# Patient Record
Sex: FEMALE | Race: WHITE | Employment: OTHER | URBAN - NONMETROPOLITAN AREA
[De-identification: names, ages, dates, MRNs, and addresses within clinical notes are randomized per-mention and may not be internally consistent; named-entity substitution may affect disease eponyms.]

---

## 2017-01-16 ENCOUNTER — OFFICE VISIT (OUTPATIENT)
Dept: FAMILY MEDICINE CLINIC | Age: 77
End: 2017-01-16

## 2017-01-16 VITALS
HEIGHT: 63 IN | BODY MASS INDEX: 34.73 KG/M2 | WEIGHT: 196 LBS | TEMPERATURE: 98.8 F | DIASTOLIC BLOOD PRESSURE: 66 MMHG | HEART RATE: 60 BPM | SYSTOLIC BLOOD PRESSURE: 122 MMHG

## 2017-01-16 DIAGNOSIS — R05.9 COUGH: Primary | ICD-10-CM

## 2017-01-16 PROCEDURE — 1090F PRES/ABSN URINE INCON ASSESS: CPT | Performed by: NURSE PRACTITIONER

## 2017-01-16 PROCEDURE — G8484 FLU IMMUNIZE NO ADMIN: HCPCS | Performed by: NURSE PRACTITIONER

## 2017-01-16 PROCEDURE — 99213 OFFICE O/P EST LOW 20 MIN: CPT | Performed by: NURSE PRACTITIONER

## 2017-01-16 PROCEDURE — G8427 DOCREV CUR MEDS BY ELIG CLIN: HCPCS | Performed by: NURSE PRACTITIONER

## 2017-01-16 PROCEDURE — G8419 CALC BMI OUT NRM PARAM NOF/U: HCPCS | Performed by: NURSE PRACTITIONER

## 2017-01-16 PROCEDURE — 1123F ACP DISCUSS/DSCN MKR DOCD: CPT | Performed by: NURSE PRACTITIONER

## 2017-01-16 PROCEDURE — 4040F PNEUMOC VAC/ADMIN/RCVD: CPT | Performed by: NURSE PRACTITIONER

## 2017-01-16 PROCEDURE — G8399 PT W/DXA RESULTS DOCUMENT: HCPCS | Performed by: NURSE PRACTITIONER

## 2017-01-16 PROCEDURE — 1036F TOBACCO NON-USER: CPT | Performed by: NURSE PRACTITIONER

## 2017-01-16 RX ORDER — FLUTICASONE PROPIONATE 50 MCG
2 SPRAY, SUSPENSION (ML) NASAL DAILY
Qty: 1 BOTTLE | Refills: 3 | Status: ON HOLD | OUTPATIENT
Start: 2017-01-16 | End: 2017-03-10

## 2017-01-16 RX ORDER — PREDNISONE 20 MG/1
20 TABLET ORAL 2 TIMES DAILY
Qty: 10 TABLET | Refills: 0 | Status: SHIPPED | OUTPATIENT
Start: 2017-01-16 | End: 2017-01-21

## 2017-01-16 RX ORDER — CETIRIZINE HYDROCHLORIDE 10 MG/1
10 TABLET ORAL DAILY
Qty: 30 TABLET | Refills: 3 | Status: ON HOLD | OUTPATIENT
Start: 2017-01-16 | End: 2017-03-10

## 2017-01-16 ASSESSMENT — ENCOUNTER SYMPTOMS
SORE THROAT: 0
RHINORRHEA: 1
COUGH: 1
WHEEZING: 1
SHORTNESS OF BREATH: 0

## 2017-02-20 ENCOUNTER — TELEPHONE (OUTPATIENT)
Dept: CARDIOLOGY | Age: 77
End: 2017-02-20

## 2017-02-20 RX ORDER — AMLODIPINE BESYLATE 5 MG/1
5 TABLET ORAL DAILY
Qty: 30 TABLET | Refills: 3 | Status: SHIPPED | OUTPATIENT
Start: 2017-02-20 | End: 2017-06-06 | Stop reason: SDUPTHER

## 2017-03-09 ENCOUNTER — APPOINTMENT (OUTPATIENT)
Dept: CT IMAGING | Age: 77
DRG: 390 | End: 2017-03-09
Payer: MEDICARE

## 2017-03-09 ENCOUNTER — HOSPITAL ENCOUNTER (INPATIENT)
Age: 77
LOS: 1 days | Discharge: HOME | DRG: 390 | End: 2017-03-11
Attending: EMERGENCY MEDICINE | Admitting: FAMILY MEDICINE
Payer: MEDICARE

## 2017-03-09 DIAGNOSIS — K56.609 SMALL BOWEL OBSTRUCTION (HCC): Primary | ICD-10-CM

## 2017-03-09 LAB
ABSOLUTE EOS #: 0.3 K/UL (ref 0–0.4)
ABSOLUTE LYMPH #: 2 K/UL (ref 1–4.8)
ABSOLUTE MONO #: 0.7 K/UL (ref 0.1–1.2)
ALBUMIN SERPL-MCNC: 4.6 G/DL (ref 3.5–5.2)
ALBUMIN/GLOBULIN RATIO: 1.5 (ref 1–2.5)
ALP BLD-CCNC: 70 U/L (ref 35–104)
ALT SERPL-CCNC: 15 U/L (ref 5–33)
ANION GAP SERPL CALCULATED.3IONS-SCNC: 19 MMOL/L (ref 9–17)
AST SERPL-CCNC: 22 U/L
BASOPHILS # BLD: 1 % (ref 0–2)
BASOPHILS ABSOLUTE: 0.1 K/UL (ref 0–0.2)
BILIRUB SERPL-MCNC: 0.69 MG/DL (ref 0.3–1.2)
BUN BLDV-MCNC: 29 MG/DL (ref 8–23)
BUN/CREAT BLD: 20 (ref 9–20)
CALCIUM SERPL-MCNC: 10.9 MG/DL (ref 8.6–10.4)
CHLORIDE BLD-SCNC: 96 MMOL/L (ref 98–107)
CO2: 26 MMOL/L (ref 20–31)
CREAT SERPL-MCNC: 1.46 MG/DL (ref 0.5–0.9)
DIFFERENTIAL TYPE: ABNORMAL
EOSINOPHILS RELATIVE PERCENT: 2 % (ref 1–4)
GFR AFRICAN AMERICAN: 42 ML/MIN
GFR NON-AFRICAN AMERICAN: 35 ML/MIN
GFR SERPL CREATININE-BSD FRML MDRD: ABNORMAL ML/MIN/{1.73_M2}
GFR SERPL CREATININE-BSD FRML MDRD: ABNORMAL ML/MIN/{1.73_M2}
GLUCOSE BLD-MCNC: 128 MG/DL (ref 70–99)
HCT VFR BLD CALC: 38.3 % (ref 36–46)
HEMOGLOBIN: 12.9 G/DL (ref 12–16)
LACTIC ACID: 2.3 MMOL/L (ref 0.5–2.2)
LIPASE: 43 U/L (ref 13–60)
LYMPHOCYTES # BLD: 18 % (ref 24–44)
MCH RBC QN AUTO: 30.9 PG (ref 26–34)
MCHC RBC AUTO-ENTMCNC: 33.7 G/DL (ref 31–37)
MCV RBC AUTO: 91.7 FL (ref 80–100)
MONOCYTES # BLD: 6 % (ref 1–7)
PDW BLD-RTO: 13.2 % (ref 11–14.5)
PLATELET # BLD: 210 K/UL (ref 140–450)
PLATELET ESTIMATE: ABNORMAL
PMV BLD AUTO: 9.8 FL (ref 6–12)
POTASSIUM SERPL-SCNC: 4.3 MMOL/L (ref 3.7–5.3)
RBC # BLD: 4.17 M/UL (ref 4–5.2)
RBC # BLD: ABNORMAL 10*6/UL
SEG NEUTROPHILS: 73 % (ref 36–66)
SEGMENTED NEUTROPHILS ABSOLUTE COUNT: 8.4 K/UL (ref 1.8–7.7)
SODIUM BLD-SCNC: 141 MMOL/L (ref 135–144)
TOTAL PROTEIN: 7.6 G/DL (ref 6.4–8.3)
WBC # BLD: 11.6 K/UL (ref 3.5–11)
WBC # BLD: ABNORMAL 10*3/UL

## 2017-03-09 PROCEDURE — 36415 COLL VENOUS BLD VENIPUNCTURE: CPT

## 2017-03-09 PROCEDURE — 6360000002 HC RX W HCPCS

## 2017-03-09 PROCEDURE — 6360000004 HC RX CONTRAST MEDICATION: Performed by: EMERGENCY MEDICINE

## 2017-03-09 PROCEDURE — 74176 CT ABD & PELVIS W/O CONTRAST: CPT | Performed by: RADIOLOGY

## 2017-03-09 PROCEDURE — 99285 EMERGENCY DEPT VISIT HI MDM: CPT

## 2017-03-09 PROCEDURE — 6360000002 HC RX W HCPCS: Performed by: EMERGENCY MEDICINE

## 2017-03-09 PROCEDURE — 80053 COMPREHEN METABOLIC PANEL: CPT

## 2017-03-09 PROCEDURE — 83605 ASSAY OF LACTIC ACID: CPT

## 2017-03-09 PROCEDURE — 96375 TX/PRO/DX INJ NEW DRUG ADDON: CPT

## 2017-03-09 PROCEDURE — 74176 CT ABD & PELVIS W/O CONTRAST: CPT

## 2017-03-09 PROCEDURE — 2580000003 HC RX 258: Performed by: EMERGENCY MEDICINE

## 2017-03-09 PROCEDURE — 85025 COMPLETE CBC W/AUTO DIFF WBC: CPT

## 2017-03-09 PROCEDURE — 96374 THER/PROPH/DIAG INJ IV PUSH: CPT

## 2017-03-09 PROCEDURE — 83690 ASSAY OF LIPASE: CPT

## 2017-03-09 RX ORDER — 0.9 % SODIUM CHLORIDE 0.9 %
1000 INTRAVENOUS SOLUTION INTRAVENOUS ONCE
Status: COMPLETED | OUTPATIENT
Start: 2017-03-09 | End: 2017-03-09

## 2017-03-09 RX ORDER — MORPHINE SULFATE 4 MG/ML
4 INJECTION, SOLUTION INTRAMUSCULAR; INTRAVENOUS ONCE
Status: DISCONTINUED | OUTPATIENT
Start: 2017-03-09 | End: 2017-03-10

## 2017-03-09 RX ORDER — KETOROLAC TROMETHAMINE 30 MG/ML
INJECTION, SOLUTION INTRAMUSCULAR; INTRAVENOUS
Status: COMPLETED
Start: 2017-03-09 | End: 2017-03-09

## 2017-03-09 RX ORDER — KETOROLAC TROMETHAMINE 30 MG/ML
30 INJECTION, SOLUTION INTRAMUSCULAR; INTRAVENOUS ONCE
Status: COMPLETED | OUTPATIENT
Start: 2017-03-09 | End: 2017-03-09

## 2017-03-09 RX ORDER — ONDANSETRON 2 MG/ML
4 INJECTION INTRAMUSCULAR; INTRAVENOUS ONCE
Status: COMPLETED | OUTPATIENT
Start: 2017-03-09 | End: 2017-03-09

## 2017-03-09 RX ADMIN — KETOROLAC TROMETHAMINE 30 MG: 30 INJECTION, SOLUTION INTRAMUSCULAR; INTRAVENOUS at 21:41

## 2017-03-09 RX ADMIN — SODIUM CHLORIDE 1000 ML: 9 INJECTION, SOLUTION INTRAVENOUS at 21:02

## 2017-03-09 RX ADMIN — KETOROLAC TROMETHAMINE 30 MG: 30 INJECTION, SOLUTION INTRAMUSCULAR at 21:41

## 2017-03-09 RX ADMIN — IOHEXOL 50 ML: 300 INJECTION, SOLUTION INTRAVENOUS at 23:17

## 2017-03-09 RX ADMIN — ONDANSETRON 4 MG: 2 INJECTION INTRAMUSCULAR; INTRAVENOUS at 21:02

## 2017-03-09 ASSESSMENT — PAIN SCALES - GENERAL
PAINLEVEL_OUTOF10: 10
PAINLEVEL_OUTOF10: 10
PAINLEVEL_OUTOF10: 5
PAINLEVEL_OUTOF10: 10

## 2017-03-09 ASSESSMENT — PAIN DESCRIPTION - PROGRESSION: CLINICAL_PROGRESSION: NOT CHANGED

## 2017-03-09 ASSESSMENT — ENCOUNTER SYMPTOMS
RESPIRATORY NEGATIVE: 1
ABDOMINAL PAIN: 1

## 2017-03-09 ASSESSMENT — PAIN DESCRIPTION - DESCRIPTORS: DESCRIPTORS: ACHING;CRAMPING

## 2017-03-09 ASSESSMENT — PAIN DESCRIPTION - ORIENTATION: ORIENTATION: MID;RIGHT;LEFT

## 2017-03-09 ASSESSMENT — PAIN DESCRIPTION - FREQUENCY: FREQUENCY: CONTINUOUS

## 2017-03-09 ASSESSMENT — PAIN DESCRIPTION - ONSET: ONSET: SUDDEN

## 2017-03-09 ASSESSMENT — PAIN DESCRIPTION - PAIN TYPE
TYPE: ACUTE PAIN
TYPE: ACUTE PAIN

## 2017-03-09 ASSESSMENT — PAIN DESCRIPTION - LOCATION: LOCATION: ABDOMEN

## 2017-03-10 PROBLEM — K56.609 SMALL BOWEL OBSTRUCTION (HCC): Status: ACTIVE | Noted: 2017-03-10

## 2017-03-10 LAB
-: ABNORMAL
ABSOLUTE EOS #: 0.1 K/UL (ref 0–0.4)
ABSOLUTE LYMPH #: 1.3 K/UL (ref 1–4.8)
ABSOLUTE MONO #: 0.6 K/UL (ref 0.1–1.2)
ALBUMIN SERPL-MCNC: 3.8 G/DL (ref 3.5–5.2)
ALBUMIN/GLOBULIN RATIO: 1.5 (ref 1–2.5)
ALP BLD-CCNC: 59 U/L (ref 35–104)
ALT SERPL-CCNC: 12 U/L (ref 5–33)
AMORPHOUS: ABNORMAL
ANION GAP SERPL CALCULATED.3IONS-SCNC: 11 MMOL/L (ref 9–17)
AST SERPL-CCNC: 17 U/L
BACTERIA: ABNORMAL
BASOPHILS # BLD: 1 % (ref 0–2)
BASOPHILS ABSOLUTE: 0 K/UL (ref 0–0.2)
BILIRUB SERPL-MCNC: 0.79 MG/DL (ref 0.3–1.2)
BILIRUBIN URINE: NEGATIVE
BUN BLDV-MCNC: 31 MG/DL (ref 8–23)
BUN/CREAT BLD: 20 (ref 9–20)
CALCIUM SERPL-MCNC: 10.1 MG/DL (ref 8.6–10.4)
CASTS UA: ABNORMAL /LPF (ref 0–2)
CHLORIDE BLD-SCNC: 98 MMOL/L (ref 98–107)
CO2: 29 MMOL/L (ref 20–31)
COLOR: ABNORMAL
COMMENT UA: ABNORMAL
CREAT SERPL-MCNC: 1.52 MG/DL (ref 0.5–0.9)
CRYSTALS, UA: ABNORMAL /HPF
DIFFERENTIAL TYPE: ABNORMAL
EKG ATRIAL RATE: 66 BPM
EKG P AXIS: -7 DEGREES
EKG P-R INTERVAL: 178 MS
EKG Q-T INTERVAL: 430 MS
EKG QRS DURATION: 96 MS
EKG QTC CALCULATION (BAZETT): 450 MS
EKG R AXIS: 7 DEGREES
EKG T AXIS: 111 DEGREES
EKG VENTRICULAR RATE: 66 BPM
EOSINOPHILS RELATIVE PERCENT: 2 % (ref 1–4)
EPITHELIAL CELLS UA: ABNORMAL /HPF (ref 0–5)
GFR AFRICAN AMERICAN: 40 ML/MIN
GFR NON-AFRICAN AMERICAN: 33 ML/MIN
GFR SERPL CREATININE-BSD FRML MDRD: ABNORMAL ML/MIN/{1.73_M2}
GFR SERPL CREATININE-BSD FRML MDRD: ABNORMAL ML/MIN/{1.73_M2}
GLUCOSE BLD-MCNC: 142 MG/DL (ref 70–99)
GLUCOSE URINE: NEGATIVE
HCT VFR BLD CALC: 33.9 % (ref 36–46)
HEMOGLOBIN: 11.6 G/DL (ref 12–16)
INR BLD: 1.1
KETONES, URINE: NEGATIVE
LACTIC ACID: 1.6 MMOL/L (ref 0.5–2.2)
LACTIC ACID: 2.3 MMOL/L (ref 0.5–2.2)
LEUKOCYTE ESTERASE, URINE: NEGATIVE
LYMPHOCYTES # BLD: 15 % (ref 24–44)
MAGNESIUM: 2 MG/DL (ref 1.6–2.6)
MCH RBC QN AUTO: 31.7 PG (ref 26–34)
MCHC RBC AUTO-ENTMCNC: 34.2 G/DL (ref 31–37)
MCV RBC AUTO: 92.5 FL (ref 80–100)
MONOCYTES # BLD: 7 % (ref 1–7)
MUCUS: ABNORMAL
NITRITE, URINE: NEGATIVE
OTHER OBSERVATIONS UA: ABNORMAL
PARTIAL THROMBOPLASTIN TIME: 23.8 SEC (ref 27–35)
PDW BLD-RTO: 13 % (ref 11–14.5)
PH UA: 8 (ref 5–6)
PLATELET # BLD: 202 K/UL (ref 140–450)
PLATELET ESTIMATE: ABNORMAL
PMV BLD AUTO: 9.8 FL (ref 6–12)
POTASSIUM SERPL-SCNC: 4.5 MMOL/L (ref 3.7–5.3)
PROTEIN UA: NEGATIVE
PROTHROMBIN TIME: 11.9 SEC (ref 9.4–11.3)
RBC # BLD: 3.66 M/UL (ref 4–5.2)
RBC # BLD: ABNORMAL 10*6/UL
RBC UA: ABNORMAL /HPF (ref 0–4)
RENAL EPITHELIAL, UA: ABNORMAL /HPF
SEG NEUTROPHILS: 75 % (ref 36–66)
SEGMENTED NEUTROPHILS ABSOLUTE COUNT: 6.3 K/UL (ref 1.8–7.7)
SODIUM BLD-SCNC: 138 MMOL/L (ref 135–144)
SPECIFIC GRAVITY UA: 1.01 (ref 1.01–1.02)
TOTAL PROTEIN: 6.3 G/DL (ref 6.4–8.3)
TRICHOMONAS: ABNORMAL
TURBIDITY: ABNORMAL
URINE HGB: NEGATIVE
UROBILINOGEN, URINE: NORMAL
WBC # BLD: 8.3 K/UL (ref 3.5–11)
WBC # BLD: ABNORMAL 10*3/UL
WBC UA: ABNORMAL /HPF (ref 0–4)
YEAST: ABNORMAL

## 2017-03-10 PROCEDURE — 85025 COMPLETE CBC W/AUTO DIFF WBC: CPT

## 2017-03-10 PROCEDURE — 2580000003 HC RX 258: Performed by: INTERNAL MEDICINE

## 2017-03-10 PROCEDURE — 99232 SBSQ HOSP IP/OBS MODERATE 35: CPT | Performed by: INTERNAL MEDICINE

## 2017-03-10 PROCEDURE — 85610 PROTHROMBIN TIME: CPT

## 2017-03-10 PROCEDURE — 93005 ELECTROCARDIOGRAM TRACING: CPT

## 2017-03-10 PROCEDURE — 87086 URINE CULTURE/COLONY COUNT: CPT

## 2017-03-10 PROCEDURE — 36415 COLL VENOUS BLD VENIPUNCTURE: CPT

## 2017-03-10 PROCEDURE — 6360000002 HC RX W HCPCS: Performed by: NURSE PRACTITIONER

## 2017-03-10 PROCEDURE — 85730 THROMBOPLASTIN TIME PARTIAL: CPT

## 2017-03-10 PROCEDURE — 81001 URINALYSIS AUTO W/SCOPE: CPT

## 2017-03-10 PROCEDURE — 6370000000 HC RX 637 (ALT 250 FOR IP): Performed by: NURSE PRACTITIONER

## 2017-03-10 PROCEDURE — 2580000003 HC RX 258: Performed by: NURSE PRACTITIONER

## 2017-03-10 PROCEDURE — 80053 COMPREHEN METABOLIC PANEL: CPT

## 2017-03-10 PROCEDURE — 83605 ASSAY OF LACTIC ACID: CPT

## 2017-03-10 PROCEDURE — 2060000000 HC ICU INTERMEDIATE R&B

## 2017-03-10 PROCEDURE — 83735 ASSAY OF MAGNESIUM: CPT

## 2017-03-10 RX ORDER — SODIUM CHLORIDE 0.9 % (FLUSH) 0.9 %
10 SYRINGE (ML) INJECTION PRN
Status: DISCONTINUED | OUTPATIENT
Start: 2017-03-10 | End: 2017-03-11 | Stop reason: HOSPADM

## 2017-03-10 RX ORDER — ACETAMINOPHEN 325 MG/1
650 TABLET ORAL EVERY 4 HOURS PRN
Status: DISCONTINUED | OUTPATIENT
Start: 2017-03-10 | End: 2017-03-11 | Stop reason: HOSPADM

## 2017-03-10 RX ORDER — AMLODIPINE BESYLATE 5 MG/1
5 TABLET ORAL DAILY
Status: DISCONTINUED | OUTPATIENT
Start: 2017-03-10 | End: 2017-03-11 | Stop reason: HOSPADM

## 2017-03-10 RX ORDER — KETOROLAC TROMETHAMINE 30 MG/ML
15 INJECTION, SOLUTION INTRAMUSCULAR; INTRAVENOUS EVERY 6 HOURS
Status: DISCONTINUED | OUTPATIENT
Start: 2017-03-10 | End: 2017-03-11 | Stop reason: HOSPADM

## 2017-03-10 RX ORDER — 0.9 % SODIUM CHLORIDE 0.9 %
1000 INTRAVENOUS SOLUTION INTRAVENOUS ONCE
Status: COMPLETED | OUTPATIENT
Start: 2017-03-10 | End: 2017-03-10

## 2017-03-10 RX ORDER — ONDANSETRON 2 MG/ML
4 INJECTION INTRAMUSCULAR; INTRAVENOUS EVERY 6 HOURS PRN
Status: DISCONTINUED | OUTPATIENT
Start: 2017-03-10 | End: 2017-03-11 | Stop reason: HOSPADM

## 2017-03-10 RX ORDER — PROPAFENONE HYDROCHLORIDE 150 MG/1
225 TABLET, FILM COATED ORAL EVERY 8 HOURS SCHEDULED
Status: DISCONTINUED | OUTPATIENT
Start: 2017-03-10 | End: 2017-03-11 | Stop reason: HOSPADM

## 2017-03-10 RX ORDER — SODIUM CHLORIDE 9 MG/ML
INJECTION, SOLUTION INTRAVENOUS CONTINUOUS
Status: DISCONTINUED | OUTPATIENT
Start: 2017-03-10 | End: 2017-03-11 | Stop reason: HOSPADM

## 2017-03-10 RX ORDER — SODIUM CHLORIDE 0.9 % (FLUSH) 0.9 %
10 SYRINGE (ML) INJECTION EVERY 12 HOURS SCHEDULED
Status: DISCONTINUED | OUTPATIENT
Start: 2017-03-10 | End: 2017-03-11 | Stop reason: HOSPADM

## 2017-03-10 RX ORDER — ASPIRIN 81 MG/1
81 TABLET ORAL DAILY
Status: DISCONTINUED | OUTPATIENT
Start: 2017-03-10 | End: 2017-03-10

## 2017-03-10 RX ADMIN — HYDROMORPHONE HYDROCHLORIDE 0.5 MG: 1 INJECTION, SOLUTION INTRAMUSCULAR; INTRAVENOUS; SUBCUTANEOUS at 04:01

## 2017-03-10 RX ADMIN — PROPAFENONE HYDROCHLORIDE 225 MG: 150 TABLET, FILM COATED ORAL at 06:32

## 2017-03-10 RX ADMIN — HYDROMORPHONE HYDROCHLORIDE: 1 INJECTION, SOLUTION INTRAMUSCULAR; INTRAVENOUS; SUBCUTANEOUS at 13:46

## 2017-03-10 RX ADMIN — KETOROLAC TROMETHAMINE 15 MG: 30 INJECTION, SOLUTION INTRAMUSCULAR at 09:56

## 2017-03-10 RX ADMIN — PROPAFENONE HYDROCHLORIDE 225 MG: 150 TABLET, FILM COATED ORAL at 14:41

## 2017-03-10 RX ADMIN — SODIUM CHLORIDE: 9 INJECTION, SOLUTION INTRAVENOUS at 18:09

## 2017-03-10 RX ADMIN — KETOROLAC TROMETHAMINE 15 MG: 30 INJECTION, SOLUTION INTRAMUSCULAR at 14:41

## 2017-03-10 RX ADMIN — PROPAFENONE HYDROCHLORIDE 225 MG: 150 TABLET, FILM COATED ORAL at 22:25

## 2017-03-10 RX ADMIN — SODIUM CHLORIDE 1000 ML: 9 INJECTION, SOLUTION INTRAVENOUS at 09:58

## 2017-03-10 RX ADMIN — KETOROLAC TROMETHAMINE 15 MG: 30 INJECTION, SOLUTION INTRAMUSCULAR at 02:29

## 2017-03-10 RX ADMIN — ONDANSETRON 4 MG: 2 INJECTION INTRAMUSCULAR; INTRAVENOUS at 04:01

## 2017-03-10 RX ADMIN — KETOROLAC TROMETHAMINE 15 MG: 30 INJECTION, SOLUTION INTRAMUSCULAR at 20:28

## 2017-03-10 RX ADMIN — Medication 10 ML: at 20:28

## 2017-03-10 RX ADMIN — SODIUM CHLORIDE: 9 INJECTION, SOLUTION INTRAVENOUS at 02:32

## 2017-03-10 RX ADMIN — AMLODIPINE BESYLATE 5 MG: 5 TABLET ORAL at 09:56

## 2017-03-10 RX ADMIN — SODIUM CHLORIDE: 9 INJECTION, SOLUTION INTRAVENOUS at 11:01

## 2017-03-10 ASSESSMENT — PAIN DESCRIPTION - FREQUENCY
FREQUENCY: INTERMITTENT
FREQUENCY: INTERMITTENT
FREQUENCY: CONTINUOUS

## 2017-03-10 ASSESSMENT — PAIN SCALES - GENERAL
PAINLEVEL_OUTOF10: 3
PAINLEVEL_OUTOF10: 1
PAINLEVEL_OUTOF10: 0
PAINLEVEL_OUTOF10: 4
PAINLEVEL_OUTOF10: 7
PAINLEVEL_OUTOF10: 0
PAINLEVEL_OUTOF10: 2
PAINLEVEL_OUTOF10: 3
PAINLEVEL_OUTOF10: 0
PAINLEVEL_OUTOF10: 0

## 2017-03-10 ASSESSMENT — PAIN DESCRIPTION - DESCRIPTORS
DESCRIPTORS: DULL
DESCRIPTORS: SORE
DESCRIPTORS: DISCOMFORT

## 2017-03-10 ASSESSMENT — PAIN DESCRIPTION - DIRECTION
RADIATING_TOWARDS: 0
RADIATING_TOWARDS: 0

## 2017-03-10 ASSESSMENT — PAIN DESCRIPTION - PAIN TYPE
TYPE: ACUTE PAIN

## 2017-03-10 ASSESSMENT — PAIN DESCRIPTION - ORIENTATION
ORIENTATION: UPPER
ORIENTATION: UPPER
ORIENTATION: LEFT;MID

## 2017-03-10 ASSESSMENT — PAIN DESCRIPTION - LOCATION
LOCATION: ABDOMEN

## 2017-03-10 ASSESSMENT — PAIN DESCRIPTION - PROGRESSION
CLINICAL_PROGRESSION: GRADUALLY WORSENING
CLINICAL_PROGRESSION: NOT CHANGED

## 2017-03-10 ASSESSMENT — PAIN DESCRIPTION - ONSET
ONSET: ON-GOING
ONSET: ON-GOING

## 2017-03-11 ENCOUNTER — APPOINTMENT (OUTPATIENT)
Dept: GENERAL RADIOLOGY | Age: 77
DRG: 390 | End: 2017-03-11
Payer: MEDICARE

## 2017-03-11 VITALS
DIASTOLIC BLOOD PRESSURE: 75 MMHG | WEIGHT: 209 LBS | HEART RATE: 73 BPM | SYSTOLIC BLOOD PRESSURE: 142 MMHG | HEIGHT: 62 IN | TEMPERATURE: 97.1 F | RESPIRATION RATE: 18 BRPM | OXYGEN SATURATION: 98 % | BODY MASS INDEX: 38.46 KG/M2

## 2017-03-11 LAB
ABSOLUTE EOS #: 0.6 K/UL (ref 0–0.4)
ABSOLUTE LYMPH #: 1.4 K/UL (ref 1–4.8)
ABSOLUTE MONO #: 0.5 K/UL (ref 0.1–1.2)
ANION GAP SERPL CALCULATED.3IONS-SCNC: 10 MMOL/L (ref 9–17)
BASOPHILS # BLD: 1 % (ref 0–2)
BASOPHILS ABSOLUTE: 0 K/UL (ref 0–0.2)
BUN BLDV-MCNC: 24 MG/DL (ref 8–23)
BUN/CREAT BLD: 21 (ref 9–20)
CALCIUM SERPL-MCNC: 8.6 MG/DL (ref 8.6–10.4)
CHLORIDE BLD-SCNC: 109 MMOL/L (ref 98–107)
CO2: 24 MMOL/L (ref 20–31)
CREAT SERPL-MCNC: 1.12 MG/DL (ref 0.5–0.9)
DIFFERENTIAL TYPE: ABNORMAL
EOSINOPHILS RELATIVE PERCENT: 8 % (ref 1–4)
GFR AFRICAN AMERICAN: 57 ML/MIN
GFR NON-AFRICAN AMERICAN: 47 ML/MIN
GFR SERPL CREATININE-BSD FRML MDRD: ABNORMAL ML/MIN/{1.73_M2}
GFR SERPL CREATININE-BSD FRML MDRD: ABNORMAL ML/MIN/{1.73_M2}
GLUCOSE BLD-MCNC: 100 MG/DL (ref 70–99)
HCT VFR BLD CALC: 30.3 % (ref 36–46)
HEMOGLOBIN: 10.1 G/DL (ref 12–16)
LYMPHOCYTES # BLD: 21 % (ref 24–44)
MCH RBC QN AUTO: 31.2 PG (ref 26–34)
MCHC RBC AUTO-ENTMCNC: 33.2 G/DL (ref 31–37)
MCV RBC AUTO: 94 FL (ref 80–100)
MONOCYTES # BLD: 7 % (ref 1–7)
PDW BLD-RTO: 13.4 % (ref 11–14.5)
PLATELET # BLD: 145 K/UL (ref 140–450)
PLATELET ESTIMATE: ABNORMAL
PMV BLD AUTO: 10 FL (ref 6–12)
POTASSIUM SERPL-SCNC: 4.2 MMOL/L (ref 3.7–5.3)
RBC # BLD: 3.22 M/UL (ref 4–5.2)
RBC # BLD: ABNORMAL 10*6/UL
SEG NEUTROPHILS: 63 % (ref 36–66)
SEGMENTED NEUTROPHILS ABSOLUTE COUNT: 4.3 K/UL (ref 1.8–7.7)
SODIUM BLD-SCNC: 143 MMOL/L (ref 135–144)
WBC # BLD: 6.9 K/UL (ref 3.5–11)
WBC # BLD: ABNORMAL 10*3/UL

## 2017-03-11 PROCEDURE — 6370000000 HC RX 637 (ALT 250 FOR IP): Performed by: NURSE PRACTITIONER

## 2017-03-11 PROCEDURE — 85025 COMPLETE CBC W/AUTO DIFF WBC: CPT

## 2017-03-11 PROCEDURE — 74022 RADEX COMPL AQT ABD SERIES: CPT

## 2017-03-11 PROCEDURE — 80048 BASIC METABOLIC PNL TOTAL CA: CPT

## 2017-03-11 PROCEDURE — 99231 SBSQ HOSP IP/OBS SF/LOW 25: CPT | Performed by: SURGERY

## 2017-03-11 PROCEDURE — 74022 RADEX COMPL AQT ABD SERIES: CPT | Performed by: RADIOLOGY

## 2017-03-11 PROCEDURE — 36415 COLL VENOUS BLD VENIPUNCTURE: CPT

## 2017-03-11 PROCEDURE — 6360000002 HC RX W HCPCS: Performed by: NURSE PRACTITIONER

## 2017-03-11 PROCEDURE — 99239 HOSP IP/OBS DSCHRG MGMT >30: CPT | Performed by: FAMILY MEDICINE

## 2017-03-11 PROCEDURE — 2580000003 HC RX 258: Performed by: INTERNAL MEDICINE

## 2017-03-11 RX ADMIN — PROPAFENONE HYDROCHLORIDE 225 MG: 150 TABLET, FILM COATED ORAL at 06:38

## 2017-03-11 RX ADMIN — PROPAFENONE HYDROCHLORIDE 225 MG: 150 TABLET, FILM COATED ORAL at 13:49

## 2017-03-11 RX ADMIN — AMLODIPINE BESYLATE 5 MG: 5 TABLET ORAL at 08:52

## 2017-03-11 RX ADMIN — METOPROLOL TARTRATE 12.5 MG: 25 TABLET ORAL at 08:52

## 2017-03-11 RX ADMIN — SODIUM CHLORIDE: 9 INJECTION, SOLUTION INTRAVENOUS at 01:53

## 2017-03-11 RX ADMIN — KETOROLAC TROMETHAMINE 15 MG: 30 INJECTION, SOLUTION INTRAMUSCULAR at 01:53

## 2017-03-11 RX ADMIN — SODIUM CHLORIDE: 9 INJECTION, SOLUTION INTRAVENOUS at 07:23

## 2017-03-11 ASSESSMENT — PAIN SCALES - GENERAL
PAINLEVEL_OUTOF10: 0

## 2017-03-13 ENCOUNTER — TELEPHONE (OUTPATIENT)
Dept: FAMILY MEDICINE CLINIC | Age: 77
End: 2017-03-13

## 2017-03-13 LAB
CULTURE: ABNORMAL
Lab: ABNORMAL
SPECIMEN DESCRIPTION: ABNORMAL
STATUS: ABNORMAL

## 2017-03-14 ENCOUNTER — TELEPHONE (OUTPATIENT)
Dept: FAMILY MEDICINE CLINIC | Age: 77
End: 2017-03-14

## 2017-03-14 ENCOUNTER — CARE COORDINATION (OUTPATIENT)
Dept: FAMILY MEDICINE CLINIC | Age: 77
End: 2017-03-14

## 2017-03-14 ENCOUNTER — OFFICE VISIT (OUTPATIENT)
Dept: FAMILY MEDICINE CLINIC | Age: 77
End: 2017-03-14
Payer: MEDICARE

## 2017-03-14 VITALS
HEART RATE: 68 BPM | TEMPERATURE: 98.3 F | OXYGEN SATURATION: 98 % | DIASTOLIC BLOOD PRESSURE: 62 MMHG | WEIGHT: 202.8 LBS | SYSTOLIC BLOOD PRESSURE: 120 MMHG | BODY MASS INDEX: 37.09 KG/M2

## 2017-03-14 DIAGNOSIS — I10 ESSENTIAL HYPERTENSION: ICD-10-CM

## 2017-03-14 DIAGNOSIS — R10.12 LUQ ABDOMINAL PAIN: ICD-10-CM

## 2017-03-14 DIAGNOSIS — R10.11 RUQ ABDOMINAL PAIN: Primary | ICD-10-CM

## 2017-03-14 DIAGNOSIS — K56.609 SMALL BOWEL OBSTRUCTION (HCC): ICD-10-CM

## 2017-03-14 PROCEDURE — 99495 TRANSJ CARE MGMT MOD F2F 14D: CPT | Performed by: FAMILY MEDICINE

## 2017-03-21 DIAGNOSIS — I10 ESSENTIAL HYPERTENSION: Primary | ICD-10-CM

## 2017-03-28 ENCOUNTER — OFFICE VISIT (OUTPATIENT)
Dept: FAMILY MEDICINE CLINIC | Age: 77
End: 2017-03-28
Payer: MEDICARE

## 2017-03-28 VITALS
WEIGHT: 198 LBS | SYSTOLIC BLOOD PRESSURE: 128 MMHG | BODY MASS INDEX: 36.21 KG/M2 | HEART RATE: 68 BPM | DIASTOLIC BLOOD PRESSURE: 80 MMHG | OXYGEN SATURATION: 98 %

## 2017-03-28 DIAGNOSIS — I48.20 CHRONIC ATRIAL FIBRILLATION (HCC): ICD-10-CM

## 2017-03-28 DIAGNOSIS — Z87.19 H/O SMALL BOWEL OBSTRUCTION: Primary | ICD-10-CM

## 2017-03-28 DIAGNOSIS — I10 ESSENTIAL HYPERTENSION: ICD-10-CM

## 2017-03-28 DIAGNOSIS — E78.5 HYPERLIPIDEMIA, UNSPECIFIED HYPERLIPIDEMIA TYPE: ICD-10-CM

## 2017-03-28 PROCEDURE — 1090F PRES/ABSN URINE INCON ASSESS: CPT | Performed by: FAMILY MEDICINE

## 2017-03-28 PROCEDURE — G8417 CALC BMI ABV UP PARAM F/U: HCPCS | Performed by: FAMILY MEDICINE

## 2017-03-28 PROCEDURE — 1111F DSCHRG MED/CURRENT MED MERGE: CPT | Performed by: FAMILY MEDICINE

## 2017-03-28 PROCEDURE — 99214 OFFICE O/P EST MOD 30 MIN: CPT | Performed by: FAMILY MEDICINE

## 2017-03-28 PROCEDURE — G8484 FLU IMMUNIZE NO ADMIN: HCPCS | Performed by: FAMILY MEDICINE

## 2017-03-28 PROCEDURE — 4040F PNEUMOC VAC/ADMIN/RCVD: CPT | Performed by: FAMILY MEDICINE

## 2017-03-28 PROCEDURE — 1036F TOBACCO NON-USER: CPT | Performed by: FAMILY MEDICINE

## 2017-03-28 PROCEDURE — 1123F ACP DISCUSS/DSCN MKR DOCD: CPT | Performed by: FAMILY MEDICINE

## 2017-03-28 PROCEDURE — G8427 DOCREV CUR MEDS BY ELIG CLIN: HCPCS | Performed by: FAMILY MEDICINE

## 2017-03-28 PROCEDURE — G8399 PT W/DXA RESULTS DOCUMENT: HCPCS | Performed by: FAMILY MEDICINE

## 2017-03-28 RX ORDER — SIMVASTATIN 20 MG
20 TABLET ORAL EVERY EVENING
Qty: 90 TABLET | Refills: 3 | Status: SHIPPED | OUTPATIENT
Start: 2017-03-28 | End: 2017-10-31 | Stop reason: SDUPTHER

## 2017-03-28 ASSESSMENT — ENCOUNTER SYMPTOMS
ALLERGIC/IMMUNOLOGIC NEGATIVE: 1
GASTROINTESTINAL NEGATIVE: 1
EYES NEGATIVE: 1
SHORTNESS OF BREATH: 1

## 2017-03-28 ASSESSMENT — PATIENT HEALTH QUESTIONNAIRE - PHQ9
1. LITTLE INTEREST OR PLEASURE IN DOING THINGS: 0
SUM OF ALL RESPONSES TO PHQ QUESTIONS 1-9: 0
SUM OF ALL RESPONSES TO PHQ9 QUESTIONS 1 & 2: 0
2. FEELING DOWN, DEPRESSED OR HOPELESS: 0

## 2017-05-25 ENCOUNTER — OFFICE VISIT (OUTPATIENT)
Dept: OBGYN | Age: 77
End: 2017-05-25
Payer: MEDICARE

## 2017-05-25 ENCOUNTER — HOSPITAL ENCOUNTER (OUTPATIENT)
Age: 77
Setting detail: SPECIMEN
Discharge: HOME | End: 2017-05-25
Payer: MEDICARE

## 2017-05-25 VITALS
WEIGHT: 198.8 LBS | DIASTOLIC BLOOD PRESSURE: 62 MMHG | HEART RATE: 68 BPM | SYSTOLIC BLOOD PRESSURE: 112 MMHG | BODY MASS INDEX: 36.58 KG/M2 | HEIGHT: 62 IN

## 2017-05-25 DIAGNOSIS — Z12.31 SCREENING MAMMOGRAM, ENCOUNTER FOR: ICD-10-CM

## 2017-05-25 DIAGNOSIS — Z12.4 CERVICAL CANCER SCREENING: ICD-10-CM

## 2017-05-25 DIAGNOSIS — Z01.419 WELL FEMALE EXAM WITH ROUTINE GYNECOLOGICAL EXAM: Primary | ICD-10-CM

## 2017-05-25 PROCEDURE — G8417 CALC BMI ABV UP PARAM F/U: HCPCS | Performed by: NURSE PRACTITIONER

## 2017-05-25 PROCEDURE — G8427 DOCREV CUR MEDS BY ELIG CLIN: HCPCS | Performed by: NURSE PRACTITIONER

## 2017-05-25 PROCEDURE — 1036F TOBACCO NON-USER: CPT | Performed by: NURSE PRACTITIONER

## 2017-05-25 PROCEDURE — G0101 CA SCREEN;PELVIC/BREAST EXAM: HCPCS | Performed by: NURSE PRACTITIONER

## 2017-05-25 RX ORDER — LISINOPRIL AND HYDROCHLOROTHIAZIDE 25; 20 MG/1; MG/1
TABLET ORAL
Qty: 90 TABLET | Refills: 3 | Status: SHIPPED | OUTPATIENT
Start: 2017-05-25 | End: 2017-10-31 | Stop reason: SDUPTHER

## 2017-05-30 LAB — CYTOLOGY REPORT: NORMAL

## 2017-06-06 ENCOUNTER — OFFICE VISIT (OUTPATIENT)
Dept: FAMILY MEDICINE CLINIC | Age: 77
End: 2017-06-06
Payer: MEDICARE

## 2017-06-06 VITALS
HEART RATE: 60 BPM | BODY MASS INDEX: 36.62 KG/M2 | DIASTOLIC BLOOD PRESSURE: 60 MMHG | SYSTOLIC BLOOD PRESSURE: 118 MMHG | WEIGHT: 197 LBS | OXYGEN SATURATION: 97 %

## 2017-06-06 DIAGNOSIS — E78.5 HYPERLIPIDEMIA, UNSPECIFIED HYPERLIPIDEMIA TYPE: ICD-10-CM

## 2017-06-06 DIAGNOSIS — I10 ESSENTIAL HYPERTENSION: Primary | ICD-10-CM

## 2017-06-06 DIAGNOSIS — K63.5 COLON POLYPS: ICD-10-CM

## 2017-06-06 DIAGNOSIS — K59.01 SLOW TRANSIT CONSTIPATION: ICD-10-CM

## 2017-06-06 LAB
-: NORMAL
ABSOLUTE EOS #: 0.1 K/UL (ref 0–0.4)
ABSOLUTE LYMPH #: 1.4 K/UL (ref 1–4.8)
ABSOLUTE MONO #: 0.4 K/UL (ref 0.1–1.2)
ALBUMIN SERPL-MCNC: 4.4 G/DL (ref 3.5–5.2)
ALBUMIN/GLOBULIN RATIO: 1.5 (ref 1–2.5)
ALP BLD-CCNC: 65 U/L (ref 35–104)
ALT SERPL-CCNC: 12 U/L (ref 5–33)
AMORPHOUS: NORMAL
ANION GAP SERPL CALCULATED.3IONS-SCNC: 12 MMOL/L (ref 9–17)
AST SERPL-CCNC: 18 U/L
BACTERIA: NORMAL
BASOPHILS # BLD: 1 %
BASOPHILS ABSOLUTE: 0.1 K/UL (ref 0–0.2)
BILIRUB SERPL-MCNC: 0.61 MG/DL (ref 0.3–1.2)
BILIRUBIN URINE: NEGATIVE
BUN BLDV-MCNC: 25 MG/DL (ref 8–23)
BUN/CREAT BLD: 17 (ref 9–20)
CALCIUM SERPL-MCNC: 10.1 MG/DL (ref 8.6–10.4)
CASTS UA: NORMAL /LPF (ref 0–2)
CHLORIDE BLD-SCNC: 104 MMOL/L (ref 98–107)
CHOLESTEROL/HDL RATIO: 2.2
CHOLESTEROL: 172 MG/DL
CO2: 28 MMOL/L (ref 20–31)
COLOR: NORMAL
COMMENT UA: NORMAL
CREAT SERPL-MCNC: 1.43 MG/DL (ref 0.5–0.9)
CRYSTALS, UA: NORMAL /HPF
DIFFERENTIAL TYPE: ABNORMAL
EOSINOPHILS RELATIVE PERCENT: 2 %
EPITHELIAL CELLS UA: NORMAL /HPF (ref 0–5)
GFR AFRICAN AMERICAN: 43 ML/MIN
GFR NON-AFRICAN AMERICAN: 36 ML/MIN
GFR SERPL CREATININE-BSD FRML MDRD: ABNORMAL ML/MIN/{1.73_M2}
GFR SERPL CREATININE-BSD FRML MDRD: ABNORMAL ML/MIN/{1.73_M2}
GLUCOSE BLD-MCNC: 110 MG/DL (ref 70–99)
GLUCOSE URINE: NEGATIVE
HCT VFR BLD CALC: 35.7 % (ref 36–46)
HDLC SERPL-MCNC: 79 MG/DL
HEMOGLOBIN: 12 G/DL (ref 12–16)
KETONES, URINE: NEGATIVE
LDL CHOLESTEROL: 75 MG/DL (ref 0–130)
LEUKOCYTE ESTERASE, URINE: NEGATIVE
LYMPHOCYTES # BLD: 22 %
MCH RBC QN AUTO: 31.1 PG (ref 26–34)
MCHC RBC AUTO-ENTMCNC: 33.7 G/DL (ref 31–37)
MCV RBC AUTO: 92.5 FL (ref 80–100)
MONOCYTES # BLD: 7 %
MUCUS: NORMAL
NITRITE, URINE: NEGATIVE
OTHER OBSERVATIONS UA: NORMAL
PDW BLD-RTO: 12.8 % (ref 11–14.5)
PH UA: 5 (ref 5–6)
PLATELET # BLD: 227 K/UL (ref 140–450)
PLATELET ESTIMATE: ABNORMAL
PMV BLD AUTO: 9.9 FL (ref 6–12)
POTASSIUM SERPL-SCNC: 4.7 MMOL/L (ref 3.7–5.3)
PROTEIN UA: NEGATIVE
RBC # BLD: 3.86 M/UL (ref 4–5.2)
RBC # BLD: ABNORMAL 10*6/UL
RBC UA: NORMAL /HPF (ref 0–4)
RENAL EPITHELIAL, UA: NORMAL /HPF
SEG NEUTROPHILS: 68 %
SEGMENTED NEUTROPHILS ABSOLUTE COUNT: 4.1 K/UL (ref 1.8–7.7)
SODIUM BLD-SCNC: 144 MMOL/L (ref 135–144)
SPECIFIC GRAVITY UA: 1.02 (ref 1.01–1.02)
TOTAL PROTEIN: 7.4 G/DL (ref 6.4–8.3)
TRICHOMONAS: NORMAL
TRIGL SERPL-MCNC: 90 MG/DL
TURBIDITY: NORMAL
URINE HGB: NEGATIVE
UROBILINOGEN, URINE: NORMAL
VLDLC SERPL CALC-MCNC: NORMAL MG/DL (ref 1–30)
WBC # BLD: 6.1 K/UL (ref 3.5–11)
WBC # BLD: ABNORMAL 10*3/UL
WBC UA: NORMAL /HPF (ref 0–4)
YEAST: NORMAL

## 2017-06-06 PROCEDURE — 80053 COMPREHEN METABOLIC PANEL: CPT | Performed by: FAMILY MEDICINE

## 2017-06-06 PROCEDURE — G8399 PT W/DXA RESULTS DOCUMENT: HCPCS | Performed by: FAMILY MEDICINE

## 2017-06-06 PROCEDURE — 1123F ACP DISCUSS/DSCN MKR DOCD: CPT | Performed by: FAMILY MEDICINE

## 2017-06-06 PROCEDURE — 99214 OFFICE O/P EST MOD 30 MIN: CPT | Performed by: FAMILY MEDICINE

## 2017-06-06 PROCEDURE — 36415 COLL VENOUS BLD VENIPUNCTURE: CPT | Performed by: FAMILY MEDICINE

## 2017-06-06 PROCEDURE — 85025 COMPLETE CBC W/AUTO DIFF WBC: CPT | Performed by: FAMILY MEDICINE

## 2017-06-06 PROCEDURE — 80061 LIPID PANEL: CPT | Performed by: FAMILY MEDICINE

## 2017-06-06 PROCEDURE — G8427 DOCREV CUR MEDS BY ELIG CLIN: HCPCS | Performed by: FAMILY MEDICINE

## 2017-06-06 PROCEDURE — 4040F PNEUMOC VAC/ADMIN/RCVD: CPT | Performed by: FAMILY MEDICINE

## 2017-06-06 PROCEDURE — 1090F PRES/ABSN URINE INCON ASSESS: CPT | Performed by: FAMILY MEDICINE

## 2017-06-06 PROCEDURE — 1036F TOBACCO NON-USER: CPT | Performed by: FAMILY MEDICINE

## 2017-06-06 PROCEDURE — G8417 CALC BMI ABV UP PARAM F/U: HCPCS | Performed by: FAMILY MEDICINE

## 2017-06-06 PROCEDURE — 81001 URINALYSIS AUTO W/SCOPE: CPT | Performed by: FAMILY MEDICINE

## 2017-06-06 RX ORDER — AMLODIPINE BESYLATE 5 MG/1
2.5 TABLET ORAL DAILY
Qty: 30 TABLET | Refills: 3
Start: 2017-06-06 | End: 2017-06-15 | Stop reason: SDUPTHER

## 2017-06-06 ASSESSMENT — ENCOUNTER SYMPTOMS
BACK PAIN: 1
ALLERGIC/IMMUNOLOGIC NEGATIVE: 1
ORTHOPNEA: 0
EYES NEGATIVE: 1
SHORTNESS OF BREATH: 0
BLURRED VISION: 0
GASTROINTESTINAL NEGATIVE: 1

## 2017-06-20 RX ORDER — AMLODIPINE BESYLATE 5 MG/1
2.5 TABLET ORAL DAILY
Qty: 30 TABLET | Refills: 3 | Status: SHIPPED | OUTPATIENT
Start: 2017-06-20 | End: 2017-10-31 | Stop reason: SDUPTHER

## 2017-07-15 ENCOUNTER — APPOINTMENT (OUTPATIENT)
Dept: GENERAL RADIOLOGY | Age: 77
DRG: 313 | End: 2017-07-15
Payer: MEDICARE

## 2017-07-15 ENCOUNTER — APPOINTMENT (OUTPATIENT)
Dept: CT IMAGING | Age: 77
DRG: 313 | End: 2017-07-15
Payer: MEDICARE

## 2017-07-15 ENCOUNTER — HOSPITAL ENCOUNTER (INPATIENT)
Age: 77
LOS: 2 days | Discharge: HOME | DRG: 313 | End: 2017-07-17
Attending: EMERGENCY MEDICINE | Admitting: FAMILY MEDICINE
Payer: MEDICARE

## 2017-07-15 DIAGNOSIS — R07.9 CHEST PAIN, UNSPECIFIED TYPE: ICD-10-CM

## 2017-07-15 DIAGNOSIS — R06.00 DYSPNEA AND RESPIRATORY ABNORMALITIES: Primary | ICD-10-CM

## 2017-07-15 DIAGNOSIS — R06.89 DYSPNEA AND RESPIRATORY ABNORMALITIES: Primary | ICD-10-CM

## 2017-07-15 LAB
ABSOLUTE EOS #: 0.1 K/UL (ref 0–0.4)
ABSOLUTE LYMPH #: 1.6 K/UL (ref 1–4.8)
ABSOLUTE MONO #: 0.3 K/UL (ref 0.1–1.2)
ANION GAP SERPL CALCULATED.3IONS-SCNC: 15 MMOL/L (ref 9–17)
BASOPHILS # BLD: 1 %
BASOPHILS ABSOLUTE: 0 K/UL (ref 0–0.2)
BUN BLDV-MCNC: 25 MG/DL (ref 8–23)
BUN/CREAT BLD: 17 (ref 9–20)
CALCIUM SERPL-MCNC: 9.8 MG/DL (ref 8.6–10.4)
CHLORIDE BLD-SCNC: 103 MMOL/L (ref 98–107)
CO2: 24 MMOL/L (ref 20–31)
CREAT SERPL-MCNC: 1.49 MG/DL (ref 0.5–0.9)
D DIMER: 589 NG/ML
DIFFERENTIAL TYPE: ABNORMAL
EKG ATRIAL RATE: 55 BPM
EKG P AXIS: 65 DEGREES
EKG P-R INTERVAL: 228 MS
EKG Q-T INTERVAL: 452 MS
EKG QRS DURATION: 114 MS
EKG QTC CALCULATION (BAZETT): 432 MS
EKG R AXIS: 0 DEGREES
EKG T AXIS: 71 DEGREES
EKG VENTRICULAR RATE: 55 BPM
EOSINOPHILS RELATIVE PERCENT: 2 %
GFR AFRICAN AMERICAN: 41 ML/MIN
GFR NON-AFRICAN AMERICAN: 34 ML/MIN
GFR SERPL CREATININE-BSD FRML MDRD: ABNORMAL ML/MIN/{1.73_M2}
GFR SERPL CREATININE-BSD FRML MDRD: ABNORMAL ML/MIN/{1.73_M2}
GLUCOSE BLD-MCNC: 112 MG/DL (ref 70–99)
HCT VFR BLD CALC: 36 % (ref 36–46)
HEMOGLOBIN: 12.1 G/DL (ref 12–16)
LYMPHOCYTES # BLD: 31 %
MCH RBC QN AUTO: 31 PG (ref 26–34)
MCHC RBC AUTO-ENTMCNC: 33.5 G/DL (ref 31–37)
MCV RBC AUTO: 92.5 FL (ref 80–100)
MONOCYTES # BLD: 7 %
PDW BLD-RTO: 12.8 % (ref 11–14.5)
PLATELET # BLD: 204 K/UL (ref 140–450)
PLATELET ESTIMATE: ABNORMAL
PMV BLD AUTO: 9.3 FL (ref 6–12)
POTASSIUM SERPL-SCNC: 4 MMOL/L (ref 3.7–5.3)
RBC # BLD: 3.89 M/UL (ref 4–5.2)
RBC # BLD: ABNORMAL 10*6/UL
SEG NEUTROPHILS: 59 %
SEGMENTED NEUTROPHILS ABSOLUTE COUNT: 3 K/UL (ref 1.8–7.7)
SODIUM BLD-SCNC: 142 MMOL/L (ref 135–144)
TROPONIN INTERP: NORMAL
TROPONIN T: <0.03 NG/ML
WBC # BLD: 5 K/UL (ref 3.5–11)
WBC # BLD: ABNORMAL 10*3/UL

## 2017-07-15 PROCEDURE — 84484 ASSAY OF TROPONIN QUANT: CPT

## 2017-07-15 PROCEDURE — 99222 1ST HOSP IP/OBS MODERATE 55: CPT | Performed by: FAMILY MEDICINE

## 2017-07-15 PROCEDURE — 85025 COMPLETE CBC W/AUTO DIFF WBC: CPT

## 2017-07-15 PROCEDURE — 71010 XR CHEST PORTABLE: CPT

## 2017-07-15 PROCEDURE — 99285 EMERGENCY DEPT VISIT HI MDM: CPT

## 2017-07-15 PROCEDURE — 93005 ELECTROCARDIOGRAM TRACING: CPT

## 2017-07-15 PROCEDURE — 6370000000 HC RX 637 (ALT 250 FOR IP): Performed by: FAMILY MEDICINE

## 2017-07-15 PROCEDURE — 2580000003 HC RX 258: Performed by: FAMILY MEDICINE

## 2017-07-15 PROCEDURE — 36415 COLL VENOUS BLD VENIPUNCTURE: CPT

## 2017-07-15 PROCEDURE — 94760 N-INVAS EAR/PLS OXIMETRY 1: CPT

## 2017-07-15 PROCEDURE — 2060000000 HC ICU INTERMEDIATE R&B

## 2017-07-15 PROCEDURE — 71010 XR CHEST PORTABLE: CPT | Performed by: RADIOLOGY

## 2017-07-15 PROCEDURE — 85379 FIBRIN DEGRADATION QUANT: CPT

## 2017-07-15 PROCEDURE — 2580000003 HC RX 258: Performed by: EMERGENCY MEDICINE

## 2017-07-15 PROCEDURE — 80048 BASIC METABOLIC PNL TOTAL CA: CPT

## 2017-07-15 PROCEDURE — 6360000002 HC RX W HCPCS: Performed by: EMERGENCY MEDICINE

## 2017-07-15 PROCEDURE — 96372 THER/PROPH/DIAG INJ SC/IM: CPT

## 2017-07-15 RX ORDER — SODIUM CHLORIDE 9 MG/ML
INJECTION, SOLUTION INTRAVENOUS CONTINUOUS
Status: DISCONTINUED | OUTPATIENT
Start: 2017-07-15 | End: 2017-07-17 | Stop reason: HOSPADM

## 2017-07-15 RX ORDER — SIMVASTATIN 20 MG
20 TABLET ORAL EVERY EVENING
Status: DISCONTINUED | OUTPATIENT
Start: 2017-07-15 | End: 2017-07-17 | Stop reason: HOSPADM

## 2017-07-15 RX ORDER — ONDANSETRON 2 MG/ML
4 INJECTION INTRAMUSCULAR; INTRAVENOUS EVERY 6 HOURS PRN
Status: DISCONTINUED | OUTPATIENT
Start: 2017-07-15 | End: 2017-07-17 | Stop reason: HOSPADM

## 2017-07-15 RX ORDER — LISINOPRIL 20 MG/1
20 TABLET ORAL DAILY
Status: DISCONTINUED | OUTPATIENT
Start: 2017-07-15 | End: 2017-07-17 | Stop reason: HOSPADM

## 2017-07-15 RX ORDER — LISINOPRIL AND HYDROCHLOROTHIAZIDE 25; 20 MG/1; MG/1
1 TABLET ORAL DAILY
Status: DISCONTINUED | OUTPATIENT
Start: 2017-07-15 | End: 2017-07-15 | Stop reason: RX

## 2017-07-15 RX ORDER — ALBUTEROL SULFATE 2.5 MG/3ML
2.5 SOLUTION RESPIRATORY (INHALATION) EVERY 4 HOURS PRN
Status: DISCONTINUED | OUTPATIENT
Start: 2017-07-15 | End: 2017-07-17 | Stop reason: HOSPADM

## 2017-07-15 RX ORDER — AMLODIPINE BESYLATE 2.5 MG/1
2.5 TABLET ORAL DAILY
Status: DISCONTINUED | OUTPATIENT
Start: 2017-07-15 | End: 2017-07-17 | Stop reason: HOSPADM

## 2017-07-15 RX ORDER — ACETAMINOPHEN 325 MG/1
650 TABLET ORAL EVERY 4 HOURS PRN
Status: DISCONTINUED | OUTPATIENT
Start: 2017-07-15 | End: 2017-07-17 | Stop reason: HOSPADM

## 2017-07-15 RX ORDER — PROPAFENONE HYDROCHLORIDE 150 MG/1
150 TABLET, FILM COATED ORAL EVERY 8 HOURS SCHEDULED
Status: DISCONTINUED | OUTPATIENT
Start: 2017-07-15 | End: 2017-07-15

## 2017-07-15 RX ORDER — HYDROCHLOROTHIAZIDE 25 MG/1
25 TABLET ORAL DAILY
Status: DISCONTINUED | OUTPATIENT
Start: 2017-07-15 | End: 2017-07-16

## 2017-07-15 RX ORDER — MORPHINE SULFATE 2 MG/ML
2 INJECTION, SOLUTION INTRAMUSCULAR; INTRAVENOUS
Status: DISCONTINUED | OUTPATIENT
Start: 2017-07-15 | End: 2017-07-17 | Stop reason: HOSPADM

## 2017-07-15 RX ORDER — MORPHINE SULFATE 4 MG/ML
4 INJECTION, SOLUTION INTRAMUSCULAR; INTRAVENOUS
Status: DISCONTINUED | OUTPATIENT
Start: 2017-07-15 | End: 2017-07-17 | Stop reason: HOSPADM

## 2017-07-15 RX ORDER — PROPAFENONE HYDROCHLORIDE 150 MG/1
TABLET, FILM COATED ORAL
Status: DISCONTINUED
Start: 2017-07-15 | End: 2017-07-15 | Stop reason: WASHOUT

## 2017-07-15 RX ORDER — 0.9 % SODIUM CHLORIDE 0.9 %
500 INTRAVENOUS SOLUTION INTRAVENOUS ONCE
Status: COMPLETED | OUTPATIENT
Start: 2017-07-15 | End: 2017-07-15

## 2017-07-15 RX ORDER — POLYETHYLENE GLYCOL 3350 17 G/17G
17 POWDER, FOR SOLUTION ORAL DAILY
Status: DISCONTINUED | OUTPATIENT
Start: 2017-07-15 | End: 2017-07-17 | Stop reason: HOSPADM

## 2017-07-15 RX ORDER — SODIUM CHLORIDE 0.9 % (FLUSH) 0.9 %
10 SYRINGE (ML) INJECTION PRN
Status: DISCONTINUED | OUTPATIENT
Start: 2017-07-15 | End: 2017-07-17 | Stop reason: HOSPADM

## 2017-07-15 RX ORDER — PROPAFENONE HYDROCHLORIDE 150 MG/1
225 TABLET, FILM COATED ORAL EVERY 8 HOURS SCHEDULED
Status: DISCONTINUED | OUTPATIENT
Start: 2017-07-15 | End: 2017-07-17 | Stop reason: HOSPADM

## 2017-07-15 RX ORDER — SODIUM CHLORIDE 0.9 % (FLUSH) 0.9 %
10 SYRINGE (ML) INJECTION EVERY 12 HOURS SCHEDULED
Status: DISCONTINUED | OUTPATIENT
Start: 2017-07-15 | End: 2017-07-17 | Stop reason: HOSPADM

## 2017-07-15 RX ORDER — ASPIRIN 81 MG/1
81 TABLET, CHEWABLE ORAL DAILY
Status: DISCONTINUED | OUTPATIENT
Start: 2017-07-15 | End: 2017-07-17 | Stop reason: HOSPADM

## 2017-07-15 RX ADMIN — SIMVASTATIN 20 MG: 20 TABLET, FILM COATED ORAL at 20:30

## 2017-07-15 RX ADMIN — SODIUM CHLORIDE: 9 INJECTION, SOLUTION INTRAVENOUS at 14:49

## 2017-07-15 RX ADMIN — METOPROLOL TARTRATE 12.5 MG: 25 TABLET, FILM COATED ORAL at 20:35

## 2017-07-15 RX ADMIN — ENOXAPARIN SODIUM 90 MG: 100 INJECTION SUBCUTANEOUS at 13:29

## 2017-07-15 RX ADMIN — SODIUM CHLORIDE 500 ML: 9 INJECTION, SOLUTION INTRAVENOUS at 10:53

## 2017-07-15 RX ADMIN — PROPAFENONE HYDROCHLORIDE 225 MG: 150 TABLET, FILM COATED ORAL at 15:30

## 2017-07-15 RX ADMIN — PROPAFENONE HYDROCHLORIDE 225 MG: 150 TABLET, FILM COATED ORAL at 21:59

## 2017-07-15 ASSESSMENT — PAIN SCALES - GENERAL
PAINLEVEL_OUTOF10: 0
PAINLEVEL_OUTOF10: 0

## 2017-07-16 LAB
ABSOLUTE EOS #: 0.1 K/UL (ref 0–0.4)
ABSOLUTE LYMPH #: 1.9 K/UL (ref 1–4.8)
ABSOLUTE MONO #: 0.4 K/UL (ref 0.1–1.2)
ANION GAP SERPL CALCULATED.3IONS-SCNC: 11 MMOL/L (ref 9–17)
BASOPHILS # BLD: 1 %
BASOPHILS ABSOLUTE: 0 K/UL (ref 0–0.2)
BUN BLDV-MCNC: 20 MG/DL (ref 8–23)
BUN/CREAT BLD: 16 (ref 9–20)
CALCIUM SERPL-MCNC: 9.4 MG/DL (ref 8.6–10.4)
CHLORIDE BLD-SCNC: 108 MMOL/L (ref 98–107)
CO2: 25 MMOL/L (ref 20–31)
CREAT SERPL-MCNC: 1.24 MG/DL (ref 0.5–0.9)
DIFFERENTIAL TYPE: ABNORMAL
EOSINOPHILS RELATIVE PERCENT: 2 %
GFR AFRICAN AMERICAN: 51 ML/MIN
GFR NON-AFRICAN AMERICAN: 42 ML/MIN
GFR SERPL CREATININE-BSD FRML MDRD: ABNORMAL ML/MIN/{1.73_M2}
GFR SERPL CREATININE-BSD FRML MDRD: ABNORMAL ML/MIN/{1.73_M2}
GLUCOSE BLD-MCNC: 104 MG/DL (ref 70–99)
HCT VFR BLD CALC: 34.4 % (ref 36–46)
HEMOGLOBIN: 11.6 G/DL (ref 12–16)
LYMPHOCYTES # BLD: 39 %
MCH RBC QN AUTO: 31.2 PG (ref 26–34)
MCHC RBC AUTO-ENTMCNC: 33.6 G/DL (ref 31–37)
MCV RBC AUTO: 92.9 FL (ref 80–100)
MONOCYTES # BLD: 8 %
PDW BLD-RTO: 13 % (ref 11–14.5)
PLATELET # BLD: 189 K/UL (ref 140–450)
PLATELET ESTIMATE: ABNORMAL
PMV BLD AUTO: 9.5 FL (ref 6–12)
POTASSIUM SERPL-SCNC: 4.5 MMOL/L (ref 3.7–5.3)
RBC # BLD: 3.71 M/UL (ref 4–5.2)
RBC # BLD: ABNORMAL 10*6/UL
SEG NEUTROPHILS: 50 %
SEGMENTED NEUTROPHILS ABSOLUTE COUNT: 2.4 K/UL (ref 1.8–7.7)
SODIUM BLD-SCNC: 144 MMOL/L (ref 135–144)
TROPONIN INTERP: NORMAL
TROPONIN T: <0.03 NG/ML
WBC # BLD: 4.8 K/UL (ref 3.5–11)
WBC # BLD: ABNORMAL 10*3/UL

## 2017-07-16 PROCEDURE — 85025 COMPLETE CBC W/AUTO DIFF WBC: CPT

## 2017-07-16 PROCEDURE — 2580000003 HC RX 258: Performed by: FAMILY MEDICINE

## 2017-07-16 PROCEDURE — 36415 COLL VENOUS BLD VENIPUNCTURE: CPT

## 2017-07-16 PROCEDURE — 94760 N-INVAS EAR/PLS OXIMETRY 1: CPT

## 2017-07-16 PROCEDURE — 2060000000 HC ICU INTERMEDIATE R&B

## 2017-07-16 PROCEDURE — 80048 BASIC METABOLIC PNL TOTAL CA: CPT

## 2017-07-16 PROCEDURE — 6370000000 HC RX 637 (ALT 250 FOR IP): Performed by: FAMILY MEDICINE

## 2017-07-16 PROCEDURE — 6360000002 HC RX W HCPCS: Performed by: FAMILY MEDICINE

## 2017-07-16 PROCEDURE — 99232 SBSQ HOSP IP/OBS MODERATE 35: CPT | Performed by: FAMILY MEDICINE

## 2017-07-16 PROCEDURE — 84484 ASSAY OF TROPONIN QUANT: CPT

## 2017-07-16 RX ADMIN — SIMVASTATIN 20 MG: 20 TABLET, FILM COATED ORAL at 20:24

## 2017-07-16 RX ADMIN — METOPROLOL TARTRATE 12.5 MG: 25 TABLET, FILM COATED ORAL at 09:30

## 2017-07-16 RX ADMIN — ENOXAPARIN SODIUM 90 MG: 100 INJECTION SUBCUTANEOUS at 09:32

## 2017-07-16 RX ADMIN — PROPAFENONE HYDROCHLORIDE 225 MG: 150 TABLET, FILM COATED ORAL at 05:33

## 2017-07-16 RX ADMIN — PROPAFENONE HYDROCHLORIDE 225 MG: 150 TABLET, FILM COATED ORAL at 22:01

## 2017-07-16 RX ADMIN — SODIUM CHLORIDE: 9 INJECTION, SOLUTION INTRAVENOUS at 00:37

## 2017-07-16 RX ADMIN — PROPAFENONE HYDROCHLORIDE 225 MG: 150 TABLET, FILM COATED ORAL at 14:35

## 2017-07-16 RX ADMIN — SODIUM CHLORIDE: 9 INJECTION, SOLUTION INTRAVENOUS at 10:38

## 2017-07-16 RX ADMIN — ASPIRIN 81 MG 81 MG: 81 TABLET ORAL at 09:29

## 2017-07-16 RX ADMIN — SODIUM CHLORIDE: 9 INJECTION, SOLUTION INTRAVENOUS at 20:28

## 2017-07-16 RX ADMIN — AMLODIPINE BESYLATE 2.5 MG: 2.5 TABLET ORAL at 09:31

## 2017-07-16 ASSESSMENT — PAIN SCALES - GENERAL
PAINLEVEL_OUTOF10: 0

## 2017-07-17 ENCOUNTER — APPOINTMENT (OUTPATIENT)
Dept: INTERVENTIONAL RADIOLOGY/VASCULAR | Age: 77
DRG: 313 | End: 2017-07-17
Payer: MEDICARE

## 2017-07-17 ENCOUNTER — APPOINTMENT (OUTPATIENT)
Dept: NUCLEAR MEDICINE | Age: 77
DRG: 313 | End: 2017-07-17
Payer: MEDICARE

## 2017-07-17 VITALS
WEIGHT: 195.77 LBS | BODY MASS INDEX: 33.42 KG/M2 | SYSTOLIC BLOOD PRESSURE: 126 MMHG | OXYGEN SATURATION: 97 % | TEMPERATURE: 97.5 F | HEIGHT: 64 IN | HEART RATE: 77 BPM | RESPIRATION RATE: 16 BRPM | DIASTOLIC BLOOD PRESSURE: 69 MMHG

## 2017-07-17 LAB
ABSOLUTE EOS #: 0.1 K/UL (ref 0–0.4)
ABSOLUTE LYMPH #: 1.6 K/UL (ref 1–4.8)
ABSOLUTE MONO #: 0.3 K/UL (ref 0.1–1.2)
ANION GAP SERPL CALCULATED.3IONS-SCNC: 10 MMOL/L (ref 9–17)
BASOPHILS # BLD: 1 %
BASOPHILS ABSOLUTE: 0 K/UL (ref 0–0.2)
BUN BLDV-MCNC: 15 MG/DL (ref 8–23)
BUN/CREAT BLD: 13 (ref 9–20)
CALCIUM SERPL-MCNC: 9.4 MG/DL (ref 8.6–10.4)
CHLORIDE BLD-SCNC: 109 MMOL/L (ref 98–107)
CO2: 25 MMOL/L (ref 20–31)
CREAT SERPL-MCNC: 1.16 MG/DL (ref 0.5–0.9)
DIFFERENTIAL TYPE: ABNORMAL
EOSINOPHILS RELATIVE PERCENT: 2 %
GFR AFRICAN AMERICAN: 55 ML/MIN
GFR NON-AFRICAN AMERICAN: 45 ML/MIN
GFR SERPL CREATININE-BSD FRML MDRD: ABNORMAL ML/MIN/{1.73_M2}
GFR SERPL CREATININE-BSD FRML MDRD: ABNORMAL ML/MIN/{1.73_M2}
GLUCOSE BLD-MCNC: 105 MG/DL (ref 70–99)
HCT VFR BLD CALC: 33 % (ref 36–46)
HEMOGLOBIN: 11.1 G/DL (ref 12–16)
LYMPHOCYTES # BLD: 37 %
MCH RBC QN AUTO: 31.2 PG (ref 26–34)
MCHC RBC AUTO-ENTMCNC: 33.6 G/DL (ref 31–37)
MCV RBC AUTO: 92.8 FL (ref 80–100)
MONOCYTES # BLD: 8 %
PDW BLD-RTO: 12.9 % (ref 11–14.5)
PLATELET # BLD: 179 K/UL (ref 140–450)
PLATELET ESTIMATE: ABNORMAL
PMV BLD AUTO: 9.2 FL (ref 6–12)
POTASSIUM SERPL-SCNC: 4.5 MMOL/L (ref 3.7–5.3)
RBC # BLD: 3.55 M/UL (ref 4–5.2)
RBC # BLD: ABNORMAL 10*6/UL
SEG NEUTROPHILS: 52 %
SEGMENTED NEUTROPHILS ABSOLUTE COUNT: 2.3 K/UL (ref 1.8–7.7)
SODIUM BLD-SCNC: 144 MMOL/L (ref 135–144)
WBC # BLD: 4.5 K/UL (ref 3.5–11)
WBC # BLD: ABNORMAL 10*3/UL

## 2017-07-17 PROCEDURE — 2580000003 HC RX 258: Performed by: FAMILY MEDICINE

## 2017-07-17 PROCEDURE — 80048 BASIC METABOLIC PNL TOTAL CA: CPT

## 2017-07-17 PROCEDURE — 94760 N-INVAS EAR/PLS OXIMETRY 1: CPT

## 2017-07-17 PROCEDURE — 78582 LUNG VENTILAT&PERFUS IMAGING: CPT | Performed by: RADIOLOGY

## 2017-07-17 PROCEDURE — 36415 COLL VENOUS BLD VENIPUNCTURE: CPT

## 2017-07-17 PROCEDURE — 78582 LUNG VENTILAT&PERFUS IMAGING: CPT

## 2017-07-17 PROCEDURE — 6360000002 HC RX W HCPCS: Performed by: FAMILY MEDICINE

## 2017-07-17 PROCEDURE — 85025 COMPLETE CBC W/AUTO DIFF WBC: CPT

## 2017-07-17 PROCEDURE — 99238 HOSP IP/OBS DSCHRG MGMT 30/<: CPT | Performed by: INTERNAL MEDICINE

## 2017-07-17 PROCEDURE — A9539 TC99M PENTETATE: HCPCS | Performed by: INTERNAL MEDICINE

## 2017-07-17 PROCEDURE — 99222 1ST HOSP IP/OBS MODERATE 55: CPT | Performed by: INTERNAL MEDICINE

## 2017-07-17 PROCEDURE — A9540 TC99M MAA: HCPCS | Performed by: INTERNAL MEDICINE

## 2017-07-17 PROCEDURE — 6370000000 HC RX 637 (ALT 250 FOR IP): Performed by: FAMILY MEDICINE

## 2017-07-17 PROCEDURE — 93970 EXTREMITY STUDY: CPT | Performed by: RADIOLOGY

## 2017-07-17 PROCEDURE — 93970 EXTREMITY STUDY: CPT

## 2017-07-17 PROCEDURE — 3430000000 HC RX DIAGNOSTIC RADIOPHARMACEUTICAL: Performed by: INTERNAL MEDICINE

## 2017-07-17 RX ORDER — KIT FOR THE PREPARATION OF TECHNETIUM TC 99M PENTETATE 20 MG/1
35 INJECTION, POWDER, LYOPHILIZED, FOR SOLUTION INTRAVENOUS; RESPIRATORY (INHALATION)
Status: COMPLETED | OUTPATIENT
Start: 2017-07-17 | End: 2017-07-17

## 2017-07-17 RX ADMIN — SODIUM CHLORIDE: 9 INJECTION, SOLUTION INTRAVENOUS at 05:25

## 2017-07-17 RX ADMIN — METOPROLOL TARTRATE 12.5 MG: 25 TABLET, FILM COATED ORAL at 09:30

## 2017-07-17 RX ADMIN — LISINOPRIL 20 MG: 20 TABLET ORAL at 09:29

## 2017-07-17 RX ADMIN — ASPIRIN 81 MG 81 MG: 81 TABLET ORAL at 09:30

## 2017-07-17 RX ADMIN — Medication 5 MILLICURIE: at 11:50

## 2017-07-17 RX ADMIN — ENOXAPARIN SODIUM 90 MG: 100 INJECTION SUBCUTANEOUS at 09:29

## 2017-07-17 RX ADMIN — KIT FOR THE PREPARATION OF TECHNETIUM TC 99M PENTETATE 35 MILLICURIE: 20 INJECTION, POWDER, LYOPHILIZED, FOR SOLUTION INTRAVENOUS; RESPIRATORY (INHALATION) at 11:20

## 2017-07-17 RX ADMIN — PROPAFENONE HYDROCHLORIDE 225 MG: 150 TABLET, FILM COATED ORAL at 05:22

## 2017-07-17 RX ADMIN — PROPAFENONE HYDROCHLORIDE 225 MG: 150 TABLET, FILM COATED ORAL at 13:37

## 2017-07-17 RX ADMIN — POLYETHYLENE GLYCOL 3350 17 G: 17 POWDER, FOR SOLUTION ORAL at 09:29

## 2017-07-17 RX ADMIN — AMLODIPINE BESYLATE 2.5 MG: 2.5 TABLET ORAL at 09:30

## 2017-07-17 ASSESSMENT — PAIN SCALES - GENERAL: PAINLEVEL_OUTOF10: 0

## 2017-07-18 ENCOUNTER — TELEPHONE (OUTPATIENT)
Dept: INTERNAL MEDICINE | Age: 77
End: 2017-07-18

## 2017-07-31 ENCOUNTER — OFFICE VISIT (OUTPATIENT)
Dept: FAMILY MEDICINE CLINIC | Age: 77
End: 2017-07-31
Payer: MEDICARE

## 2017-07-31 VITALS
BODY MASS INDEX: 33.64 KG/M2 | WEIGHT: 196 LBS | HEART RATE: 59 BPM | SYSTOLIC BLOOD PRESSURE: 114 MMHG | TEMPERATURE: 97.6 F | OXYGEN SATURATION: 98 % | DIASTOLIC BLOOD PRESSURE: 58 MMHG

## 2017-07-31 DIAGNOSIS — R06.09 DYSPNEA ON EXERTION: Primary | ICD-10-CM

## 2017-07-31 PROCEDURE — 99495 TRANSJ CARE MGMT MOD F2F 14D: CPT | Performed by: FAMILY MEDICINE

## 2017-08-03 ENCOUNTER — OFFICE VISIT (OUTPATIENT)
Dept: CARDIOLOGY | Age: 77
End: 2017-08-03
Payer: MEDICARE

## 2017-08-03 VITALS
BODY MASS INDEX: 35.66 KG/M2 | DIASTOLIC BLOOD PRESSURE: 70 MMHG | HEIGHT: 62 IN | WEIGHT: 193.8 LBS | HEART RATE: 48 BPM | SYSTOLIC BLOOD PRESSURE: 136 MMHG

## 2017-08-03 DIAGNOSIS — I48.0 PAROXYSMAL ATRIAL FIBRILLATION (HCC): Primary | ICD-10-CM

## 2017-08-03 DIAGNOSIS — R00.1 SINUS BRADYCARDIA: ICD-10-CM

## 2017-08-03 DIAGNOSIS — I10 ESSENTIAL HYPERTENSION: ICD-10-CM

## 2017-08-03 DIAGNOSIS — I51.89 DIASTOLIC DYSFUNCTION: ICD-10-CM

## 2017-08-03 PROCEDURE — 1090F PRES/ABSN URINE INCON ASSESS: CPT | Performed by: INTERNAL MEDICINE

## 2017-08-03 PROCEDURE — G8417 CALC BMI ABV UP PARAM F/U: HCPCS | Performed by: INTERNAL MEDICINE

## 2017-08-03 PROCEDURE — 1123F ACP DISCUSS/DSCN MKR DOCD: CPT | Performed by: INTERNAL MEDICINE

## 2017-08-03 PROCEDURE — 4040F PNEUMOC VAC/ADMIN/RCVD: CPT | Performed by: INTERNAL MEDICINE

## 2017-08-03 PROCEDURE — G8399 PT W/DXA RESULTS DOCUMENT: HCPCS | Performed by: INTERNAL MEDICINE

## 2017-08-03 PROCEDURE — 99214 OFFICE O/P EST MOD 30 MIN: CPT | Performed by: INTERNAL MEDICINE

## 2017-08-03 PROCEDURE — G8427 DOCREV CUR MEDS BY ELIG CLIN: HCPCS | Performed by: INTERNAL MEDICINE

## 2017-08-03 PROCEDURE — 1036F TOBACCO NON-USER: CPT | Performed by: INTERNAL MEDICINE

## 2017-08-03 PROCEDURE — 93000 ELECTROCARDIOGRAM COMPLETE: CPT | Performed by: INTERNAL MEDICINE

## 2017-08-03 PROCEDURE — 1111F DSCHRG MED/CURRENT MED MERGE: CPT | Performed by: INTERNAL MEDICINE

## 2017-08-03 RX ORDER — FUROSEMIDE 20 MG/1
20 TABLET ORAL DAILY PRN
Qty: 30 TABLET | Refills: 2 | Status: SHIPPED | OUTPATIENT
Start: 2017-08-03 | End: 2017-10-31 | Stop reason: SDUPTHER

## 2017-10-31 RX ORDER — FUROSEMIDE 20 MG/1
20 TABLET ORAL DAILY PRN
Qty: 90 TABLET | Refills: 3 | Status: SHIPPED | OUTPATIENT
Start: 2017-10-31 | End: 2018-10-17 | Stop reason: SDUPTHER

## 2017-10-31 RX ORDER — PROPAFENONE HYDROCHLORIDE 150 MG/1
TABLET, FILM COATED ORAL
Qty: 405 TABLET | Refills: 3 | Status: SHIPPED | OUTPATIENT
Start: 2017-10-31 | End: 2017-12-16 | Stop reason: SDUPTHER

## 2017-10-31 RX ORDER — SIMVASTATIN 20 MG
20 TABLET ORAL EVERY EVENING
Qty: 90 TABLET | Refills: 3 | Status: SHIPPED | OUTPATIENT
Start: 2017-10-31 | End: 2018-10-17 | Stop reason: SDUPTHER

## 2017-10-31 RX ORDER — AMLODIPINE BESYLATE 5 MG/1
2.5 TABLET ORAL DAILY
Qty: 45 TABLET | Refills: 3 | Status: SHIPPED | OUTPATIENT
Start: 2017-10-31 | End: 2018-10-17 | Stop reason: SDUPTHER

## 2017-10-31 RX ORDER — LISINOPRIL AND HYDROCHLOROTHIAZIDE 25; 20 MG/1; MG/1
TABLET ORAL
Qty: 90 TABLET | Refills: 3 | Status: SHIPPED | OUTPATIENT
Start: 2017-10-31 | End: 2018-10-17 | Stop reason: SDUPTHER

## 2017-11-15 ENCOUNTER — OFFICE VISIT (OUTPATIENT)
Dept: CARDIOLOGY | Age: 77
End: 2017-11-15
Payer: MEDICARE

## 2017-11-15 ENCOUNTER — NURSE ONLY (OUTPATIENT)
Dept: LAB | Age: 77
End: 2017-11-15
Payer: MEDICARE

## 2017-11-15 VITALS
DIASTOLIC BLOOD PRESSURE: 68 MMHG | WEIGHT: 199.8 LBS | SYSTOLIC BLOOD PRESSURE: 132 MMHG | HEIGHT: 62 IN | BODY MASS INDEX: 36.77 KG/M2

## 2017-11-15 DIAGNOSIS — I10 ESSENTIAL HYPERTENSION: Primary | ICD-10-CM

## 2017-11-15 DIAGNOSIS — R06.89 DYSPNEA AND RESPIRATORY ABNORMALITIES: ICD-10-CM

## 2017-11-15 DIAGNOSIS — E78.5 HYPERLIPIDEMIA, UNSPECIFIED HYPERLIPIDEMIA TYPE: ICD-10-CM

## 2017-11-15 DIAGNOSIS — R07.89 OTHER CHEST PAIN: ICD-10-CM

## 2017-11-15 DIAGNOSIS — I48.0 PAROXYSMAL ATRIAL FIBRILLATION (HCC): ICD-10-CM

## 2017-11-15 DIAGNOSIS — R06.00 DYSPNEA AND RESPIRATORY ABNORMALITIES: ICD-10-CM

## 2017-11-15 DIAGNOSIS — Z23 NEED FOR PROPHYLACTIC VACCINATION AND INOCULATION AGAINST INFLUENZA: Primary | ICD-10-CM

## 2017-11-15 PROCEDURE — 93000 ELECTROCARDIOGRAM COMPLETE: CPT | Performed by: INTERNAL MEDICINE

## 2017-11-15 PROCEDURE — 99213 OFFICE O/P EST LOW 20 MIN: CPT | Performed by: INTERNAL MEDICINE

## 2017-11-15 PROCEDURE — 90662 IIV NO PRSV INCREASED AG IM: CPT | Performed by: FAMILY MEDICINE

## 2017-11-15 PROCEDURE — G0008 ADMIN INFLUENZA VIRUS VAC: HCPCS | Performed by: FAMILY MEDICINE

## 2017-11-15 NOTE — PROGRESS NOTES
Today's Date: 11/15/2017  Patient's Name: [place]  Patient's age: 65 y. o., 1940    Subjective: The patient is a 65y.o. year old, , female is in the office for F/U post hospital admission. She was admitted with SOB and PE workup was negative, she was treated for diastolic dysfunction and her BB dose was increased. Since then she has been dizzy and light headed with pulse rate in the 40s. She kelle any chest pain or discomfort. No orthopnea or PND. Kelle any palpitation. Past Medical History:   has a past medical history of Arthritis; Cervical spine degeneration; Chronic renal insufficiency, stage II (mild); Colon polyps; Constipation; Cystocele; Diverticulitis of colon; Dyspepsia; GERD (gastroesophageal reflux disease); GI bleed; Head ache; Hyperlipidemia; Hypertension; Multiple melanocytic nevi; Mumps; Over weight; Panic attacks; Paroxysmal atrial fibrillation ([address] Shingles; and Tachycardia. Past Surgical History:   has a past surgical history that includes back surgery (1985, 1986); Breast surgery (Right, 1994); Cardiac catheterization (11/28/2000); Colonoscopy (2006, 2009); colectomy (10/2006); Uterine fibroid embolization (10/2006); bladder repair (5/6/2009); and Colonoscopy (2016). Home Medications:  Prior to Admission medications    Medication Sig Start Date End Date Taking?  Authorizing Provider   metoprolol tartrate (LOPRESSOR) 25 MG tablet Take 0.5 tablets by mouth 2 times daily 10/31/17  Yes Norberto Whitmore MD   furosemide (LASIX) 20 MG tablet Take 1 tablet by mouth daily as needed (Edema) 10/31/17  Yes Norberto Whitmore MD   amLODIPine [facility]) 5 MG tablet Take 0.5 tablets by mouth daily 10/31/17  Yes Norberto Whitmore MD   lisinopril-hydrochlorothiazide [facility]) 20-25 MG per tablet take 1 tablet by mouth once daily 10/31/17  Yes Norberto Whitmore MD   simvastatin (ZOCOR) 20 MG tablet Take 1 tablet by mouth every evening 10/31/17  Yes Vladimir Evangelista Jayna Adhikari MD   propafenone (RYTHMOL) 150 MG tablet TAKE ONE AND ONE-HALF TABLETS THREE TIMES A DAY 10/31/17  Yes Kj Frazier MD   polyethylene glycol [facility]) powder Take 17 g by mouth daily   Yes Historical Provider, MD   aspirin 81 MG tablet Take 81 mg by mouth daily. Yes Historical Provider, MD       Allergies:  Morphine and Other    Social History:   reports that she has never smoked. She has never used smokeless tobacco. She reports that she does not drink alcohol or use drugs. Review of Systems:  · Constitutional: there has been no unanticipated weight loss. There's been No change in energy level, No change in activity level. · Eyes: No visual changes or diplopia. No scleral icterus. · ENT: No Headaches, hearing loss or vertigo. No mouth sores or sore throat. · Cardiovascular: As above. · Respiratory: No SOB, cough or hemoptysis. · Gastrointestinal: No abdominal pain, appetite loss, blood in stools. No change in bowel or bladder habits. · Genitourinary: No dysuria, trouble voiding, or hematuria. · Musculoskeletal:  No gait disturbance, No weakness or joint complaints. · Integumentary: No rash or pruritis. · Neurological: No headache, diplopia, change in muscle strength, numbness or tingling. No change in gait, balance, coordination, mood, affect, memory, mentation, behavior. · Psychiatric: No anxiety, or depression. · Endocrine: No temperature intolerance. No excessive thirst, fluid intake, or urination. No tremor. · Hematologic/Lymphatic: No abnormal bruising or bleeding, blood clots or swollen lymph nodes. · Allergic/Immunologic: No nasal congestion or hives. Physical Exam:  /68   Ht 5' 2.01\" (1.575 m)   Wt 199 lb 12.8 oz (90.6 kg)   BMI 36.53 kg/m²   Constitutional and General Appearance: alert, cooperative, no distress and appears stated age  [de-identified]: PERRL, no cervical lymphadenopathy. No masses palpable.  Normal oral mucosa  Respiratory:  · Normal excursion and expansion decrease BB dose. Plan of Treatment:     1- Continue Rythmol to maintain NSR. 2- Continue BB and ASA and decrease Metoprolol dose to 12.5 mg BID. 3- Continue  ACEI and Statins. 4- Add Lasix 20 mg PRN. 5- No need for cardiac workup at this time. 6- F/U in 3 months. Thank you for allowing me to participate in the care of this patient, please do not hesitate to call if you have any questions. Taty Mello, [id number] [facility]  [facility]. [facility]  [phone]

## 2017-11-15 NOTE — PROGRESS NOTES
Have you had an allergic reaction to the flu (influenza) shot? no  Are you allergic to eggs or any component of the flu vaccine? n  Do you have a history of Guillain-Graford Syndrome (GBS), which is paralysis after receiving the flu vaccine? no  Are you feeling well today? yes  Flu vaccine given as ordered. Patient tolerated it well. No questions re: VIS information.

## 2017-12-18 RX ORDER — PROPAFENONE HYDROCHLORIDE 150 MG/1
TABLET, FILM COATED ORAL
Qty: 405 TABLET | Refills: 0 | Status: SHIPPED | OUTPATIENT
Start: 2017-12-18 | End: 2018-03-18 | Stop reason: SDUPTHER

## 2018-05-09 ENCOUNTER — OFFICE VISIT (OUTPATIENT)
Dept: CARDIOLOGY | Age: 78
End: 2018-05-09
Payer: MEDICARE

## 2018-05-09 VITALS
DIASTOLIC BLOOD PRESSURE: 70 MMHG | HEART RATE: 51 BPM | BODY MASS INDEX: 36.8 KG/M2 | HEIGHT: 62 IN | WEIGHT: 200 LBS | SYSTOLIC BLOOD PRESSURE: 124 MMHG

## 2018-05-09 DIAGNOSIS — E78.5 HYPERLIPIDEMIA, UNSPECIFIED HYPERLIPIDEMIA TYPE: ICD-10-CM

## 2018-05-09 DIAGNOSIS — I48.0 PAROXYSMAL ATRIAL FIBRILLATION (HCC): Primary | ICD-10-CM

## 2018-05-09 DIAGNOSIS — I51.89 DIASTOLIC DYSFUNCTION: ICD-10-CM

## 2018-05-09 DIAGNOSIS — I10 ESSENTIAL HYPERTENSION: ICD-10-CM

## 2018-05-09 PROCEDURE — 99214 OFFICE O/P EST MOD 30 MIN: CPT | Performed by: INTERNAL MEDICINE

## 2018-05-09 PROCEDURE — 93000 ELECTROCARDIOGRAM COMPLETE: CPT | Performed by: INTERNAL MEDICINE

## 2018-06-04 ENCOUNTER — OFFICE VISIT (OUTPATIENT)
Dept: OBGYN | Age: 78
End: 2018-06-04
Payer: MEDICARE

## 2018-06-04 ENCOUNTER — HOSPITAL ENCOUNTER (OUTPATIENT)
Dept: MAMMOGRAPHY | Age: 78
Discharge: HOME | End: 2018-06-06
Payer: MEDICARE

## 2018-06-04 VITALS
WEIGHT: 197 LBS | HEIGHT: 62 IN | HEART RATE: 72 BPM | RESPIRATION RATE: 16 BRPM | BODY MASS INDEX: 36.25 KG/M2 | DIASTOLIC BLOOD PRESSURE: 68 MMHG | SYSTOLIC BLOOD PRESSURE: 124 MMHG

## 2018-06-04 DIAGNOSIS — Z12.31 SCREENING MAMMOGRAM, ENCOUNTER FOR: ICD-10-CM

## 2018-06-04 DIAGNOSIS — R32 FEMALE INCONTINENCE: ICD-10-CM

## 2018-06-04 DIAGNOSIS — B37.2 MONILIAL RASH: ICD-10-CM

## 2018-06-04 DIAGNOSIS — Z12.31 VISIT FOR SCREENING MAMMOGRAM: ICD-10-CM

## 2018-06-04 DIAGNOSIS — N95.2 VAGINAL ATROPHY: Primary | ICD-10-CM

## 2018-06-04 PROCEDURE — 99214 OFFICE O/P EST MOD 30 MIN: CPT | Performed by: NURSE PRACTITIONER

## 2018-06-04 PROCEDURE — G8427 DOCREV CUR MEDS BY ELIG CLIN: HCPCS | Performed by: NURSE PRACTITIONER

## 2018-06-04 PROCEDURE — 4040F PNEUMOC VAC/ADMIN/RCVD: CPT | Performed by: NURSE PRACTITIONER

## 2018-06-04 PROCEDURE — 1090F PRES/ABSN URINE INCON ASSESS: CPT | Performed by: NURSE PRACTITIONER

## 2018-06-04 PROCEDURE — 77067 SCR MAMMO BI INCL CAD: CPT

## 2018-06-04 PROCEDURE — 1123F ACP DISCUSS/DSCN MKR DOCD: CPT | Performed by: NURSE PRACTITIONER

## 2018-06-04 PROCEDURE — G8399 PT W/DXA RESULTS DOCUMENT: HCPCS | Performed by: NURSE PRACTITIONER

## 2018-06-04 PROCEDURE — 0509F URINE INCON PLAN DOCD: CPT | Performed by: NURSE PRACTITIONER

## 2018-06-04 PROCEDURE — 1036F TOBACCO NON-USER: CPT | Performed by: NURSE PRACTITIONER

## 2018-06-04 PROCEDURE — G8417 CALC BMI ABV UP PARAM F/U: HCPCS | Performed by: NURSE PRACTITIONER

## 2018-06-04 RX ORDER — NYSTATIN 100000 [USP'U]/G
POWDER TOPICAL
Qty: 60 G | Refills: 5 | Status: SHIPPED | OUTPATIENT
Start: 2018-06-04 | End: 2021-03-04

## 2018-06-04 RX ORDER — CLOTRIMAZOLE AND BETAMETHASONE DIPROPIONATE 10; .64 MG/G; MG/G
CREAM TOPICAL
Qty: 45 G | Refills: 1 | Status: SHIPPED | OUTPATIENT
Start: 2018-06-04 | End: 2021-03-04

## 2018-06-05 ENCOUNTER — HOSPITAL ENCOUNTER (OUTPATIENT)
Dept: LAB | Age: 78
Setting detail: SPECIMEN
Discharge: HOME | End: 2018-06-05
Payer: MEDICARE

## 2018-06-05 ENCOUNTER — OFFICE VISIT (OUTPATIENT)
Dept: FAMILY MEDICINE CLINIC | Age: 78
End: 2018-06-05
Payer: MEDICARE

## 2018-06-05 VITALS
BODY MASS INDEX: 36.25 KG/M2 | OXYGEN SATURATION: 97 % | SYSTOLIC BLOOD PRESSURE: 120 MMHG | HEIGHT: 62 IN | DIASTOLIC BLOOD PRESSURE: 60 MMHG | WEIGHT: 197 LBS | HEART RATE: 60 BPM

## 2018-06-05 DIAGNOSIS — I10 ESSENTIAL HYPERTENSION: Primary | ICD-10-CM

## 2018-06-05 DIAGNOSIS — E78.5 HYPERLIPIDEMIA, UNSPECIFIED HYPERLIPIDEMIA TYPE: ICD-10-CM

## 2018-06-05 DIAGNOSIS — Z00.00 ROUTINE GENERAL MEDICAL EXAMINATION AT A HEALTH CARE FACILITY: ICD-10-CM

## 2018-06-05 DIAGNOSIS — I10 ESSENTIAL HYPERTENSION: ICD-10-CM

## 2018-06-05 LAB
ABSOLUTE EOS #: 0.3 K/UL (ref 0–0.4)
ABSOLUTE IMMATURE GRANULOCYTE: ABNORMAL K/UL (ref 0–0.3)
ABSOLUTE LYMPH #: 1.3 K/UL (ref 1–4.8)
ABSOLUTE MONO #: 0.6 K/UL (ref 0.1–1.2)
ALBUMIN SERPL-MCNC: 4.4 G/DL (ref 3.5–5.2)
ALBUMIN/GLOBULIN RATIO: 1.6 (ref 1–2.5)
ALP BLD-CCNC: 73 U/L (ref 35–104)
ALT SERPL-CCNC: 12 U/L (ref 5–33)
ANION GAP SERPL CALCULATED.3IONS-SCNC: 12 MMOL/L (ref 9–17)
AST SERPL-CCNC: 15 U/L
BASOPHILS # BLD: 1 % (ref 0–1)
BASOPHILS ABSOLUTE: 0.1 K/UL (ref 0–0.2)
BILIRUB SERPL-MCNC: 0.6 MG/DL (ref 0.3–1.2)
BUN BLDV-MCNC: 22 MG/DL (ref 8–23)
BUN/CREAT BLD: 15 (ref 9–20)
CALCIUM SERPL-MCNC: 10.1 MG/DL (ref 8.6–10.4)
CHLORIDE BLD-SCNC: 102 MMOL/L (ref 98–107)
CHOLESTEROL/HDL RATIO: 2.2
CHOLESTEROL: 159 MG/DL
CO2: 29 MMOL/L (ref 20–31)
CREAT SERPL-MCNC: 1.45 MG/DL (ref 0.5–0.9)
DIFFERENTIAL TYPE: ABNORMAL
EOSINOPHILS RELATIVE PERCENT: 6 % (ref 1–7)
GFR AFRICAN AMERICAN: 42 ML/MIN
GFR NON-AFRICAN AMERICAN: 35 ML/MIN
GFR SERPL CREATININE-BSD FRML MDRD: ABNORMAL ML/MIN/{1.73_M2}
GFR SERPL CREATININE-BSD FRML MDRD: ABNORMAL ML/MIN/{1.73_M2}
GLUCOSE BLD-MCNC: 111 MG/DL (ref 70–99)
HCT VFR BLD CALC: 36.7 % (ref 36–46)
HDLC SERPL-MCNC: 72 MG/DL
HEMOGLOBIN: 12.3 G/DL (ref 12–16)
IMMATURE GRANULOCYTES: ABNORMAL %
LDL CHOLESTEROL: 66 MG/DL (ref 0–130)
LYMPHOCYTES # BLD: 22 % (ref 16–46)
MCH RBC QN AUTO: 32.1 PG (ref 26–34)
MCHC RBC AUTO-ENTMCNC: 33.4 G/DL (ref 31–37)
MCV RBC AUTO: 95.9 FL (ref 80–100)
MONOCYTES # BLD: 10 % (ref 4–11)
NRBC AUTOMATED: ABNORMAL PER 100 WBC
PDW BLD-RTO: 12.5 % (ref 11–14.5)
PLATELET # BLD: 222 K/UL (ref 140–450)
PLATELET ESTIMATE: ABNORMAL
PMV BLD AUTO: 10.2 FL (ref 6–12)
POTASSIUM SERPL-SCNC: 5.1 MMOL/L (ref 3.7–5.3)
RBC # BLD: 3.82 M/UL (ref 4–5.2)
RBC # BLD: ABNORMAL 10*6/UL
SEG NEUTROPHILS: 61 % (ref 43–77)
SEGMENTED NEUTROPHILS ABSOLUTE COUNT: 3.7 K/UL (ref 1.8–7.7)
SODIUM BLD-SCNC: 143 MMOL/L (ref 135–144)
TOTAL PROTEIN: 7.2 G/DL (ref 6.4–8.3)
TRIGL SERPL-MCNC: 106 MG/DL
VLDLC SERPL CALC-MCNC: NORMAL MG/DL (ref 1–30)
WBC # BLD: 6 K/UL (ref 3.5–11)
WBC # BLD: ABNORMAL 10*3/UL

## 2018-06-05 PROCEDURE — 1123F ACP DISCUSS/DSCN MKR DOCD: CPT | Performed by: FAMILY MEDICINE

## 2018-06-05 PROCEDURE — 99213 OFFICE O/P EST LOW 20 MIN: CPT | Performed by: FAMILY MEDICINE

## 2018-06-05 PROCEDURE — G8427 DOCREV CUR MEDS BY ELIG CLIN: HCPCS | Performed by: FAMILY MEDICINE

## 2018-06-05 PROCEDURE — 1090F PRES/ABSN URINE INCON ASSESS: CPT | Performed by: FAMILY MEDICINE

## 2018-06-05 PROCEDURE — 80053 COMPREHEN METABOLIC PANEL: CPT

## 2018-06-05 PROCEDURE — 4040F PNEUMOC VAC/ADMIN/RCVD: CPT | Performed by: FAMILY MEDICINE

## 2018-06-05 PROCEDURE — 36415 COLL VENOUS BLD VENIPUNCTURE: CPT

## 2018-06-05 PROCEDURE — 85025 COMPLETE CBC W/AUTO DIFF WBC: CPT

## 2018-06-05 PROCEDURE — G0438 PPPS, INITIAL VISIT: HCPCS | Performed by: FAMILY MEDICINE

## 2018-06-05 PROCEDURE — 1036F TOBACCO NON-USER: CPT | Performed by: FAMILY MEDICINE

## 2018-06-05 PROCEDURE — G8399 PT W/DXA RESULTS DOCUMENT: HCPCS | Performed by: FAMILY MEDICINE

## 2018-06-05 PROCEDURE — 80061 LIPID PANEL: CPT

## 2018-06-05 PROCEDURE — G8417 CALC BMI ABV UP PARAM F/U: HCPCS | Performed by: FAMILY MEDICINE

## 2018-06-05 RX ORDER — CHOLECALCIFEROL (VITAMIN D3) 25 MCG
1 TABLET,CHEWABLE ORAL DAILY
COMMUNITY

## 2018-06-05 ASSESSMENT — ENCOUNTER SYMPTOMS
ORTHOPNEA: 0
GASTROINTESTINAL NEGATIVE: 1
BACK PAIN: 1
BLURRED VISION: 0
SHORTNESS OF BREATH: 1

## 2018-06-05 ASSESSMENT — PATIENT HEALTH QUESTIONNAIRE - PHQ9
SUM OF ALL RESPONSES TO PHQ9 QUESTIONS 1 & 2: 0
SUM OF ALL RESPONSES TO PHQ QUESTIONS 1-9: 0
2. FEELING DOWN, DEPRESSED OR HOPELESS: 0
1. LITTLE INTEREST OR PLEASURE IN DOING THINGS: 0

## 2018-06-05 ASSESSMENT — LIFESTYLE VARIABLES: HOW OFTEN DO YOU HAVE A DRINK CONTAINING ALCOHOL: 0

## 2018-06-05 ASSESSMENT — ANXIETY QUESTIONNAIRES: GAD7 TOTAL SCORE: 0

## 2018-10-17 RX ORDER — AMLODIPINE BESYLATE 5 MG/1
2.5 TABLET ORAL DAILY
Qty: 45 TABLET | Refills: 3 | Status: SHIPPED | OUTPATIENT
Start: 2018-10-17 | End: 2019-10-10 | Stop reason: SDUPTHER

## 2018-10-17 RX ORDER — FUROSEMIDE 20 MG/1
20 TABLET ORAL DAILY PRN
Qty: 90 TABLET | Refills: 3 | Status: SHIPPED | OUTPATIENT
Start: 2018-10-17 | End: 2021-03-04

## 2018-10-17 RX ORDER — SIMVASTATIN 20 MG
20 TABLET ORAL EVERY EVENING
Qty: 90 TABLET | Refills: 3 | Status: SHIPPED | OUTPATIENT
Start: 2018-10-17 | End: 2019-02-21 | Stop reason: SDUPTHER

## 2018-10-17 RX ORDER — LISINOPRIL AND HYDROCHLOROTHIAZIDE 25; 20 MG/1; MG/1
TABLET ORAL
Qty: 90 TABLET | Refills: 3 | Status: SHIPPED | OUTPATIENT
Start: 2018-10-17 | End: 2019-10-10 | Stop reason: SDUPTHER

## 2018-11-14 ENCOUNTER — NURSE ONLY (OUTPATIENT)
Dept: LAB | Age: 78
End: 2018-11-14
Payer: MEDICARE

## 2018-11-14 ENCOUNTER — OFFICE VISIT (OUTPATIENT)
Dept: CARDIOLOGY | Age: 78
End: 2018-11-14
Payer: MEDICARE

## 2018-11-14 VITALS
BODY MASS INDEX: 36.8 KG/M2 | HEIGHT: 62 IN | HEART RATE: 64 BPM | SYSTOLIC BLOOD PRESSURE: 136 MMHG | WEIGHT: 200 LBS | DIASTOLIC BLOOD PRESSURE: 70 MMHG

## 2018-11-14 DIAGNOSIS — I10 ESSENTIAL HYPERTENSION: ICD-10-CM

## 2018-11-14 DIAGNOSIS — Z23 NEED FOR PROPHYLACTIC VACCINATION AND INOCULATION AGAINST INFLUENZA: Primary | ICD-10-CM

## 2018-11-14 DIAGNOSIS — I51.89 DIASTOLIC DYSFUNCTION: ICD-10-CM

## 2018-11-14 DIAGNOSIS — E78.5 HYPERLIPIDEMIA, UNSPECIFIED HYPERLIPIDEMIA TYPE: ICD-10-CM

## 2018-11-14 DIAGNOSIS — I48.0 PAROXYSMAL ATRIAL FIBRILLATION (HCC): Primary | ICD-10-CM

## 2018-11-14 PROCEDURE — 99213 OFFICE O/P EST LOW 20 MIN: CPT | Performed by: INTERNAL MEDICINE

## 2018-11-14 PROCEDURE — G0008 ADMIN INFLUENZA VIRUS VAC: HCPCS | Performed by: FAMILY MEDICINE

## 2018-11-14 PROCEDURE — 90662 IIV NO PRSV INCREASED AG IM: CPT | Performed by: FAMILY MEDICINE

## 2018-11-14 PROCEDURE — 93000 ELECTROCARDIOGRAM COMPLETE: CPT | Performed by: INTERNAL MEDICINE

## 2018-11-14 NOTE — PROGRESS NOTES
controlled. 3-HLP: on statin  4-Preserved LV systolic function. 5-Cardiac cath 2001 with no significant CAD. 6-GI bleed hospitalized at [facility] followed by colonoscopy by Dr. 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 7-Diastolic dysfunction: no signs of volume overload. Plan of Treatment:     1-Continue current medical treatment with ASA, BB, ACEi, Diuretics, CCB, Rythmol, and statin. 2-Aggressive risk factors modifications. 3-Diet, exercise and loose weight. 4-No need for cardiac workup at this time. 5-F/U in 6 months.       Electronically signed by Huy Golden MD on 11/14/2018 at 10:11 AM      [facility]  [phone]

## 2018-12-07 ENCOUNTER — OFFICE VISIT (OUTPATIENT)
Dept: FAMILY MEDICINE CLINIC | Age: 78
End: 2018-12-07
Payer: MEDICARE

## 2018-12-07 ENCOUNTER — HOSPITAL ENCOUNTER (OUTPATIENT)
Dept: LAB | Age: 78
Setting detail: SPECIMEN
Discharge: HOME | End: 2018-12-07
Payer: MEDICARE

## 2018-12-07 VITALS
OXYGEN SATURATION: 99 % | WEIGHT: 200 LBS | BODY MASS INDEX: 36.8 KG/M2 | HEIGHT: 62 IN | SYSTOLIC BLOOD PRESSURE: 105 MMHG | HEART RATE: 58 BPM | DIASTOLIC BLOOD PRESSURE: 60 MMHG

## 2018-12-07 DIAGNOSIS — I10 ESSENTIAL HYPERTENSION: ICD-10-CM

## 2018-12-07 DIAGNOSIS — I10 ESSENTIAL HYPERTENSION: Primary | ICD-10-CM

## 2018-12-07 DIAGNOSIS — N18.30 CHRONIC KIDNEY DISEASE, STAGE III (MODERATE) (HCC): ICD-10-CM

## 2018-12-07 LAB
ANION GAP SERPL CALCULATED.3IONS-SCNC: 10 MMOL/L (ref 9–17)
BUN BLDV-MCNC: 30 MG/DL (ref 8–23)
BUN/CREAT BLD: 20 (ref 9–20)
CALCIUM SERPL-MCNC: 9.9 MG/DL (ref 8.6–10.4)
CHLORIDE BLD-SCNC: 106 MMOL/L (ref 98–107)
CO2: 27 MMOL/L (ref 20–31)
CREAT SERPL-MCNC: 1.52 MG/DL (ref 0.5–0.9)
GFR AFRICAN AMERICAN: 40 ML/MIN
GFR NON-AFRICAN AMERICAN: 33 ML/MIN
GFR SERPL CREATININE-BSD FRML MDRD: ABNORMAL ML/MIN/{1.73_M2}
GFR SERPL CREATININE-BSD FRML MDRD: ABNORMAL ML/MIN/{1.73_M2}
GLUCOSE BLD-MCNC: 100 MG/DL (ref 70–99)
POTASSIUM SERPL-SCNC: 4.7 MMOL/L (ref 3.7–5.3)
SODIUM BLD-SCNC: 143 MMOL/L (ref 135–144)

## 2018-12-07 PROCEDURE — G8482 FLU IMMUNIZE ORDER/ADMIN: HCPCS | Performed by: FAMILY MEDICINE

## 2018-12-07 PROCEDURE — 36415 COLL VENOUS BLD VENIPUNCTURE: CPT

## 2018-12-07 PROCEDURE — 4040F PNEUMOC VAC/ADMIN/RCVD: CPT | Performed by: FAMILY MEDICINE

## 2018-12-07 PROCEDURE — G8427 DOCREV CUR MEDS BY ELIG CLIN: HCPCS | Performed by: FAMILY MEDICINE

## 2018-12-07 PROCEDURE — G8399 PT W/DXA RESULTS DOCUMENT: HCPCS | Performed by: FAMILY MEDICINE

## 2018-12-07 PROCEDURE — 1090F PRES/ABSN URINE INCON ASSESS: CPT | Performed by: FAMILY MEDICINE

## 2018-12-07 PROCEDURE — G8417 CALC BMI ABV UP PARAM F/U: HCPCS | Performed by: FAMILY MEDICINE

## 2018-12-07 PROCEDURE — 99214 OFFICE O/P EST MOD 30 MIN: CPT | Performed by: FAMILY MEDICINE

## 2018-12-07 PROCEDURE — 1123F ACP DISCUSS/DSCN MKR DOCD: CPT | Performed by: FAMILY MEDICINE

## 2018-12-07 PROCEDURE — 80048 BASIC METABOLIC PNL TOTAL CA: CPT

## 2018-12-07 PROCEDURE — 1036F TOBACCO NON-USER: CPT | Performed by: FAMILY MEDICINE

## 2018-12-07 PROCEDURE — 1101F PT FALLS ASSESS-DOCD LE1/YR: CPT | Performed by: FAMILY MEDICINE

## 2018-12-07 ASSESSMENT — VISUAL ACUITY
OD_CC: 20/50
OS_CC: 20/50

## 2018-12-07 ASSESSMENT — ENCOUNTER SYMPTOMS
GASTROINTESTINAL NEGATIVE: 1
ORTHOPNEA: 0
EYES NEGATIVE: 1
RESPIRATORY NEGATIVE: 1
BLURRED VISION: 0
SHORTNESS OF BREATH: 0
BACK PAIN: 1

## 2018-12-07 ASSESSMENT — ANXIETY QUESTIONNAIRES: GAD7 TOTAL SCORE: 0

## 2018-12-07 ASSESSMENT — PATIENT HEALTH QUESTIONNAIRE - PHQ9
SUM OF ALL RESPONSES TO PHQ QUESTIONS 1-9: 0
SUM OF ALL RESPONSES TO PHQ QUESTIONS 1-9: 0

## 2018-12-07 ASSESSMENT — LIFESTYLE VARIABLES: HOW OFTEN DO YOU HAVE A DRINK CONTAINING ALCOHOL: 0

## 2018-12-07 NOTE — PATIENT INSTRUCTIONS
Patient Education        Eating Healthy Foods: Care Instructions  Your Care Instructions    Eating healthy foods can help lower your risk for disease. Healthy food gives you energy and keeps your heart strong, your brain active, your muscles working, and your bones strong. A healthy diet includes a variety of foods from the basic food groups: grains, vegetables, fruits, milk and milk products, and meat and beans. Some people may eat more of their favorite foods from only one food group and, as a result, miss getting the nutrients they need. So, it is important to pay attention not only to what you eat but also to what you are missing from your diet. You can eat a healthy, balanced diet by making a few small changes. Follow-up care is a key part of your treatment and safety. Be sure to make and go to all appointments, and call your doctor if you are having problems. It's also a good idea to know your test results and keep a list of the medicines you take. How can you care for yourself at home? Look at what you eat  · Keep a food diary for a week or two and record everything you eat or drink. Track the number of servings you eat from each food group. · For a balanced diet every day, eat a variety of:  ? 6 or more ounce-equivalents of grains, such as cereals, breads, crackers, rice, or pasta, every day. An ounce-equivalent is 1 slice of bread, 1 cup of ready-to-eat cereal, or ½ cup of cooked rice, cooked pasta, or cooked cereal.  ? 2½ cups of vegetables, especially:  § Dark-green vegetables such as broccoli and spinach. § Orange vegetables such as carrots and sweet potatoes. § Dry beans (such as bang and kidney beans) and peas (such as lentils). ? 2 cups of fresh, frozen, or canned fruit. A small apple or 1 banana or orange equals 1 cup. ? 3 cups of nonfat or low-fat milk, yogurt, or other milk products. ? 5½ ounces of meat and beans, such as chicken, fish, lean meat, beans, nuts, and seeds.  One egg, 1 information.

## 2018-12-07 NOTE — PROGRESS NOTES
Subjective:      Patient ID: Kellee Brannon is a 77 y.o. female. No problems noted regarding her kidney function        Hypertension   This is a chronic problem. The current episode started more than 1 year ago. The problem is controlled. Pertinent negatives include no anxiety, blurred vision, chest pain, headaches, malaise/fatigue, neck pain, orthopnea, palpitations, peripheral edema, PND, shortness of breath or sweats. There are no associated agents to hypertension. Risk factors for coronary artery disease include dyslipidemia, obesity and post-menopausal state. Past treatments include ACE inhibitors, beta blockers, calcium channel blockers and diuretics. The current treatment provides significant improvement. Compliance problems include diet. Review of Systems   Constitutional: Negative. Negative for malaise/fatigue. HENT: Negative. Eyes: Negative. Negative for blurred vision. Respiratory: Negative. Negative for shortness of breath. Cardiovascular: Negative. Negative for chest pain, palpitations, orthopnea and PND. Gastrointestinal: Negative. Genitourinary: Negative. Musculoskeletal: Positive for arthralgias and back pain. Negative for neck pain. Skin: Negative. Neurological: Negative. Negative for headaches. Hematological: Negative. Psychiatric/Behavioral: Negative. Objective:   Physical Exam   Constitutional: She is oriented to person, place, and time. She appears well-developed and well-nourished. HENT:   Head: Normocephalic and atraumatic. Right Ear: External ear normal.   Left Ear: External ear normal.   Nose: Nose normal.   Mouth/Throat: Oropharynx is clear and moist.   Eyes: Pupils are equal, round, and reactive to light. EOM are normal.   Neck: No thyromegaly present. Cardiovascular: Normal rate and regular rhythm. No murmur heard. Pulmonary/Chest: Effort normal and breath sounds normal.   Abdominal: Soft.  Bowel sounds are normal.

## 2019-02-21 ENCOUNTER — TELEPHONE (OUTPATIENT)
Dept: FAMILY MEDICINE CLINIC | Age: 79
End: 2019-02-21

## 2019-02-21 RX ORDER — SIMVASTATIN 20 MG
20 TABLET ORAL EVERY EVENING
Qty: 90 TABLET | Refills: 4 | Status: SHIPPED | OUTPATIENT
Start: 2019-02-21 | End: 2020-03-12

## 2019-02-21 RX ORDER — PROPAFENONE HYDROCHLORIDE 150 MG/1
TABLET, FILM COATED ORAL
Qty: 405 TABLET | Refills: 4 | Status: SHIPPED | OUTPATIENT
Start: 2019-02-21 | End: 2019-05-13

## 2019-04-15 ENCOUNTER — OFFICE VISIT (OUTPATIENT)
Dept: FAMILY MEDICINE CLINIC | Age: 79
End: 2019-04-15
Payer: MEDICARE

## 2019-04-15 ENCOUNTER — HOSPITAL ENCOUNTER (OUTPATIENT)
Dept: GENERAL RADIOLOGY | Age: 79
Discharge: HOME | End: 2019-04-17
Payer: MEDICARE

## 2019-04-15 VITALS
DIASTOLIC BLOOD PRESSURE: 70 MMHG | HEART RATE: 63 BPM | SYSTOLIC BLOOD PRESSURE: 130 MMHG | RESPIRATION RATE: 20 BRPM | OXYGEN SATURATION: 98 % | WEIGHT: 199.8 LBS | BODY MASS INDEX: 36.54 KG/M2 | TEMPERATURE: 97.6 F

## 2019-04-15 DIAGNOSIS — M25.561 ACUTE PAIN OF RIGHT KNEE: ICD-10-CM

## 2019-04-15 DIAGNOSIS — M25.561 ACUTE PAIN OF RIGHT KNEE: Primary | ICD-10-CM

## 2019-04-15 PROCEDURE — 73562 X-RAY EXAM OF KNEE 3: CPT

## 2019-04-15 PROCEDURE — G8399 PT W/DXA RESULTS DOCUMENT: HCPCS | Performed by: NURSE PRACTITIONER

## 2019-04-15 PROCEDURE — 1123F ACP DISCUSS/DSCN MKR DOCD: CPT | Performed by: NURSE PRACTITIONER

## 2019-04-15 PROCEDURE — 1090F PRES/ABSN URINE INCON ASSESS: CPT | Performed by: NURSE PRACTITIONER

## 2019-04-15 PROCEDURE — 4040F PNEUMOC VAC/ADMIN/RCVD: CPT | Performed by: NURSE PRACTITIONER

## 2019-04-15 PROCEDURE — 99213 OFFICE O/P EST LOW 20 MIN: CPT | Performed by: NURSE PRACTITIONER

## 2019-04-15 PROCEDURE — G8427 DOCREV CUR MEDS BY ELIG CLIN: HCPCS | Performed by: NURSE PRACTITIONER

## 2019-04-15 PROCEDURE — G8510 SCR DEP NEG, NO PLAN REQD: HCPCS | Performed by: NURSE PRACTITIONER

## 2019-04-15 PROCEDURE — 1036F TOBACCO NON-USER: CPT | Performed by: NURSE PRACTITIONER

## 2019-04-15 PROCEDURE — G8417 CALC BMI ABV UP PARAM F/U: HCPCS | Performed by: NURSE PRACTITIONER

## 2019-04-15 RX ORDER — PREDNISONE 20 MG/1
20 TABLET ORAL 2 TIMES DAILY
Qty: 10 TABLET | Refills: 0 | Status: SHIPPED | OUTPATIENT
Start: 2019-04-15 | End: 2019-04-20

## 2019-04-15 ASSESSMENT — PATIENT HEALTH QUESTIONNAIRE - PHQ9
SUM OF ALL RESPONSES TO PHQ QUESTIONS 1-9: 0
2. FEELING DOWN, DEPRESSED OR HOPELESS: 0
1. LITTLE INTEREST OR PLEASURE IN DOING THINGS: 0
SUM OF ALL RESPONSES TO PHQ QUESTIONS 1-9: 0
SUM OF ALL RESPONSES TO PHQ9 QUESTIONS 1 & 2: 0

## 2019-04-15 ASSESSMENT — ENCOUNTER SYMPTOMS
SHORTNESS OF BREATH: 0
COUGH: 0
COLOR CHANGE: 0

## 2019-04-15 NOTE — PROGRESS NOTES
metoprolol tartrate (LOPRESSOR) 25 MG tablet Take 0.5 tablets by mouth 2 times daily Yes Rocio Nguyen MD   polyethylene glycol (GLYCOLAX) powder Take 17 g by mouth daily Yes Historical Provider, MD   aspirin 81 MG tablet Take 81 mg by mouth daily. Yes Historical Provider, MD   clotrimazole-betamethasone (LOTRISONE) 1-0.05 % cream Apply a thin film to the affected area 2 times daily. FRANNIE Corona CNP   nystatin (MYCOSTATIN) 693538 UNIT/GM powder Apply 3 times daily. FRANNIE Dickerson CNP        Social History     Tobacco Use    Smoking status: Never Smoker    Smokeless tobacco: Never Used    Tobacco comment: cwaltmtrev rrt 7/15/17   Substance Use Topics    Alcohol use: No        Vitals:    04/15/19 1153   BP: 130/70   Site: Right Upper Arm   Position: Sitting   Cuff Size: Large Adult   Pulse: 63   Resp: 20   Temp: 97.6 °F (36.4 °C)   TempSrc: Tympanic   SpO2: 98%   Weight: 199 lb 12.8 oz (90.6 kg)     Estimated body mass index is 36.54 kg/m² as calculated from the following:    Height as of 12/7/18: 5' 2\" (1.575 m). Weight as of this encounter: 199 lb 12.8 oz (90.6 kg). Physical Exam   Constitutional: She appears well-developed and well-nourished. HENT:   Head: Normocephalic and atraumatic. Eyes: Conjunctivae are normal.   Musculoskeletal:        Right knee: She exhibits swelling, effusion and ecchymosis. She exhibits no erythema. Tenderness found. Medial joint line tenderness noted. Legs:  Skin: Skin is warm and dry. No rash noted. Psychiatric: She has a normal mood and affect. Her behavior is normal. Judgment and thought content normal.   Nursing note and vitals reviewed. ASSESSMENT/PLAN:  1. Acute pain of right knee  - predniSONE (DELTASONE) 20 MG tablet; Take 1 tablet by mouth 2 times daily for 5 days  Dispense: 10 tablet; Refill: 0  - XR KNEE RIGHT (3 VIEWS); Future    Return if symptoms worsen or fail to improve.     Orders Placed This Encounter   Medications    predniSONE (DELTASONE) 20 MG tablet     Sig: Take 1 tablet by mouth 2 times daily for 5 days     Dispense:  10 tablet     Refill:  0     Orders Placed This Encounter   Procedures    XR KNEE RIGHT (3 VIEWS)     Standing Status:   Future     Standing Expiration Date:   4/15/2020     Order Specific Question:   Reason for exam:     Answer:   R/O Abnormality       Patient given educational materials - see patient instructions. Discussed use, benefit, and side effects of prescribed medications. All patient questions answered. Pt voiced understanding. Reviewed health maintenance. Instructed to continue current medications, diet and exercise.     Electronically signed by FRANNIE Gauthier CNP on 4/15/2019 at 12:32 PM

## 2019-04-15 NOTE — PATIENT INSTRUCTIONS

## 2019-04-23 ENCOUNTER — TELEPHONE (OUTPATIENT)
Dept: FAMILY MEDICINE CLINIC | Age: 79
End: 2019-04-23

## 2019-04-23 DIAGNOSIS — M25.561 ACUTE PAIN OF RIGHT KNEE: Primary | ICD-10-CM

## 2019-04-23 NOTE — TELEPHONE ENCOUNTER
Pt called- still having right knee problems- would like to see Dr Sharlynn Fothergill in [place] for knee- had films at [facility]

## 2019-04-29 NOTE — TELEPHONE ENCOUNTER
here to request films- called [facility] - they will prepare a disc- they can  later today

## 2019-05-13 ENCOUNTER — TELEPHONE (OUTPATIENT)
Dept: FAMILY MEDICINE CLINIC | Age: 79
End: 2019-05-13

## 2019-05-13 RX ORDER — PROPAFENONE HYDROCHLORIDE 225 MG/1
225 TABLET, FILM COATED ORAL EVERY 8 HOURS
Qty: 270 TABLET | Refills: 3 | Status: SHIPPED | OUTPATIENT
Start: 2019-05-13 | End: 2020-04-27

## 2019-05-14 NOTE — TELEPHONE ENCOUNTER
Mary Allen called from [facility]- told them a new script was sent in- they will cancel the old script that is on back order and fill 225 mg TID

## 2019-05-16 NOTE — TELEPHONE ENCOUNTER
Patient has upcoming appointment with Dr Ricci Pacheco to discuss .  States she has plenty of medicine until then

## 2019-05-22 ENCOUNTER — OFFICE VISIT (OUTPATIENT)
Dept: CARDIOLOGY | Age: 79
End: 2019-05-22
Payer: MEDICARE

## 2019-05-22 VITALS
DIASTOLIC BLOOD PRESSURE: 60 MMHG | WEIGHT: 200.2 LBS | SYSTOLIC BLOOD PRESSURE: 110 MMHG | BODY MASS INDEX: 36.84 KG/M2 | HEART RATE: 58 BPM | HEIGHT: 62 IN

## 2019-05-22 DIAGNOSIS — E78.5 HYPERLIPIDEMIA, UNSPECIFIED HYPERLIPIDEMIA TYPE: ICD-10-CM

## 2019-05-22 DIAGNOSIS — R94.31 ABNORMAL ECG: ICD-10-CM

## 2019-05-22 DIAGNOSIS — I48.0 PAF (PAROXYSMAL ATRIAL FIBRILLATION) (HCC): ICD-10-CM

## 2019-05-22 DIAGNOSIS — R06.09 DYSPNEA ON EXERTION: Primary | ICD-10-CM

## 2019-05-22 DIAGNOSIS — I10 ESSENTIAL HYPERTENSION: ICD-10-CM

## 2019-05-22 PROCEDURE — 99214 OFFICE O/P EST MOD 30 MIN: CPT | Performed by: INTERNAL MEDICINE

## 2019-05-22 PROCEDURE — 93000 ELECTROCARDIOGRAM COMPLETE: CPT | Performed by: INTERNAL MEDICINE

## 2019-05-22 PROCEDURE — G8427 DOCREV CUR MEDS BY ELIG CLIN: HCPCS | Performed by: INTERNAL MEDICINE

## 2019-05-22 PROCEDURE — 1090F PRES/ABSN URINE INCON ASSESS: CPT | Performed by: INTERNAL MEDICINE

## 2019-05-22 PROCEDURE — G8399 PT W/DXA RESULTS DOCUMENT: HCPCS | Performed by: INTERNAL MEDICINE

## 2019-05-22 PROCEDURE — 1036F TOBACCO NON-USER: CPT | Performed by: INTERNAL MEDICINE

## 2019-05-22 PROCEDURE — 1123F ACP DISCUSS/DSCN MKR DOCD: CPT | Performed by: INTERNAL MEDICINE

## 2019-05-22 PROCEDURE — G8417 CALC BMI ABV UP PARAM F/U: HCPCS | Performed by: INTERNAL MEDICINE

## 2019-05-22 PROCEDURE — 4040F PNEUMOC VAC/ADMIN/RCVD: CPT | Performed by: INTERNAL MEDICINE

## 2019-05-22 NOTE — PROGRESS NOTES
Today's Date: 5/22/2019  Patient's Name: [place]  Patient's age: 77 y. o., 1940    Subjective:  [place]  is here for follow up. She reports dyspnea on exertion such has going flight of stairs. No chest pain, no PND, no syncope or pre-syncope, no orthopnea. Past Medical History:   has a past medical history of Arthritis, Cervical spine degeneration, Chronic renal insufficiency, stage II (mild), Colon polyps, Constipation, Cystocele, Diverticulitis of colon, Dyspepsia, GERD (gastroesophageal reflux disease), GI bleed, Head ache, Hyperlipidemia, Hypertension, Multiple melanocytic nevi, Mumps, Over weight, Panic attacks, Paroxysmal atrial fibrillation ([address] Shingles, and Tachycardia. Past Surgical History:   has a past surgical history that includes back surgery (1985, 1986); Breast surgery (Right, 1994); Cardiac catheterization (11/28/2000); Colonoscopy (2006, 2009); colectomy (10/2006); Uterine fibroid embolization (10/2006); bladder repair (5/6/2009); and Colonoscopy (2016). Home Medications:  Prior to Admission medications    Medication Sig Start Date End Date Taking?  Authorizing Provider   propafenone (RYTHMOL) 225 MG tablet Take 1 tablet by mouth every 8 hours 5/13/19   Teresita John MD   simvastatin (ZOCOR) 20 MG tablet Take 1 tablet by mouth every evening 2/21/19   Teresita John MD   lisinopril-hydrochlorothiazide (PRINZIDE;ZESTORETIC) 20-25 MG per tablet take 1 tablet by mouth once daily 10/17/18   Blima Holter, MD   furosemide (LASIX) 20 MG tablet Take 1 tablet by mouth daily as needed (Edema)  Patient taking differently: Take 20 mg by mouth daily as needed (Edema) Indications: prn  10/17/18   Blima Holter, MD   amLODIPine (NORVASC) 5 MG tablet Take 0.5 tablets by mouth daily 10/17/18   Blima Holter, MD   Cholecalciferol (VITAMIN D3) 1000 units CAPS Take by mouth    Historical Provider, MD   Cyanocobalamin (B-12) 1000 MCG CAPS Take by mouth Indications: High Appearance: alert, cooperative, no distress and appears stated age  [de-identified]: PERRL, no cervical lymphadenopathy. No masses palpable. Normal oral mucosa  Respiratory:  · Normal excursion and expansion without use of accessory muscles  · Resp Auscultation: Good respiratory effort. No for increased work of breathing. On auscultation: clear to auscultation bilaterally  Cardiovascular:  · Heart tones are crisp and normal. regular S1 and S2.  · Jugular venous pulsation Normal  · The carotid upstroke is normal in amplitude and contour without delay or bruit   Abdomen:   · soft  · Bowel sounds present  Extremities:  ·  No edema  Neurological:  · Alert and oriented. Cardiac Data:  EKG: SR, ST-T changes anterior ischemia    Labs:     CBC: No results for input(s): WBC, HGB, HCT, PLT in the last 72 hours. BMP: No results for input(s): NA, K, CO2, BUN, CREATININE, LABGLOM, GLUCOSE in the last 72 hours. PT/INR: No results for input(s): PROTIME, INR in the last 72 hours. FASTING LIPID PANEL:  Lab Results   Component Value Date    HDL 72 06/05/2018    TRIG 106 06/05/2018     LIVER PROFILE:No results for input(s): AST, ALT, LABALBU in the last 72 hours. Assessment / Acute Cardiac Problems:      1-PAF: Remains in NSR maintained on Rythmol. Last episode of a.fib was 2009. 2-HTN: Fairly well controlled. 3-HLP: on statin  4-Preserved LV systolic function. 5-Cardiac cath 2001 with no significant CAD. 6-GI bleed hospitalized at [facility] followed by colonoscopy by Dr. 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 7-Diastolic dysfunction: no signs of volume overload. 8-Dyspnea        Plan of Treatment:      1-Continue current medical treatment with ASA, BB, ACEi, Diuretics, CCB, Rythmol, and statin. 2-Aggressive risk factors modifications. 3-Diet, exercise and loose weight.   4-Recommend lexiscan stress test due to ECG changes and dyspnea  5-F/U in 6 months.     [id number]           [phone]

## 2019-06-06 ENCOUNTER — HOSPITAL ENCOUNTER (OUTPATIENT)
Dept: MAMMOGRAPHY | Age: 79
Discharge: HOME | End: 2019-06-08
Payer: MEDICARE

## 2019-06-06 ENCOUNTER — OFFICE VISIT (OUTPATIENT)
Dept: OBGYN | Age: 79
End: 2019-06-06
Payer: MEDICARE

## 2019-06-06 ENCOUNTER — HOSPITAL ENCOUNTER (OUTPATIENT)
Age: 79
Setting detail: SPECIMEN
Discharge: HOME | End: 2019-06-06
Payer: MEDICARE

## 2019-06-06 VITALS
HEIGHT: 62 IN | SYSTOLIC BLOOD PRESSURE: 124 MMHG | WEIGHT: 202 LBS | DIASTOLIC BLOOD PRESSURE: 64 MMHG | HEART RATE: 62 BPM | BODY MASS INDEX: 37.17 KG/M2

## 2019-06-06 DIAGNOSIS — Z12.4 SCREENING FOR CERVICAL CANCER: ICD-10-CM

## 2019-06-06 DIAGNOSIS — Z78.0 MENOPAUSE: ICD-10-CM

## 2019-06-06 DIAGNOSIS — R92.2 DENSE BREAST: Primary | ICD-10-CM

## 2019-06-06 DIAGNOSIS — Z01.419 WELL FEMALE EXAM WITH ROUTINE GYNECOLOGICAL EXAM: ICD-10-CM

## 2019-06-06 DIAGNOSIS — Z12.31 VISIT FOR SCREENING MAMMOGRAM: ICD-10-CM

## 2019-06-06 PROCEDURE — 1123F ACP DISCUSS/DSCN MKR DOCD: CPT | Performed by: NURSE PRACTITIONER

## 2019-06-06 PROCEDURE — G8427 DOCREV CUR MEDS BY ELIG CLIN: HCPCS | Performed by: NURSE PRACTITIONER

## 2019-06-06 PROCEDURE — 1036F TOBACCO NON-USER: CPT | Performed by: NURSE PRACTITIONER

## 2019-06-06 PROCEDURE — G8399 PT W/DXA RESULTS DOCUMENT: HCPCS | Performed by: NURSE PRACTITIONER

## 2019-06-06 PROCEDURE — G0101 CA SCREEN;PELVIC/BREAST EXAM: HCPCS | Performed by: NURSE PRACTITIONER

## 2019-06-06 PROCEDURE — G0145 SCR C/V CYTO,THINLAYER,RESCR: HCPCS

## 2019-06-06 PROCEDURE — G8417 CALC BMI ABV UP PARAM F/U: HCPCS | Performed by: NURSE PRACTITIONER

## 2019-06-06 PROCEDURE — 77067 SCR MAMMO BI INCL CAD: CPT

## 2019-06-06 PROCEDURE — 4040F PNEUMOC VAC/ADMIN/RCVD: CPT | Performed by: NURSE PRACTITIONER

## 2019-06-06 PROCEDURE — 1090F PRES/ABSN URINE INCON ASSESS: CPT | Performed by: NURSE PRACTITIONER

## 2019-06-06 NOTE — PROGRESS NOTES
[address] [facility]  2019              77 y.o. Chief Complaint   Patient presents with    Gynecologic Exam         No LMP recorded. Patient is postmenopausal.           No referring provider defined for this encounter. HPI :Annual Exam  Patient presents for annual exam.  Counseling on healthy lifestyle reviewed, as well as the need for self breast exam. We discussed and reviewed the need for routine screenings and immunization updates when appropriate. History of anterior repair . Good bladder control. Denies vaginal bleeding or spotting and is aware of the need to report any she would note in the future. Performs monthly self breast exams. Mammogram later today. The patient is not sexually active. last pap: was normal  The patient has regular exercise: no-knee problems .  We did review the need for and frequency of both weight bearing and strengthening as tolerated by the patient. ________________________________________________________________________  OB History    Para Term  AB Living   4 4 4 0 0 4   SAB TAB Ectopic Molar Multiple Live Births   0 0 0 0 0 0      # Outcome Date GA Lbr Juanito/2nd Weight Sex Delivery Anes PTL Lv   4 Term     F Vag-Spont      3 Term     M Vag-Spont      2 Term     M Vag-Spont      1 Term     F Vag-Spont        Past Medical History:   Diagnosis Date    Arthritis     degenerative    Cervical spine degeneration     Chronic renal insufficiency, stage II (mild)     Colon polyps     history of     Constipation     Cystocele     Diverticulitis of colon     history of     Dyspepsia     history of     GERD (gastroesophageal reflux disease)     GI bleed 2016    Head ache     Hyperlipidemia     Hypertension     Multiple melanocytic nevi     Mumps     Over weight     Panic attacks     history of     Paroxysmal atrial fibrillation (HCC)     Shingles     history of     Tachycardia     episodic Past Surgical History:   Procedure Laterality Date    BACK SURGERY  1985, 1986    BLADDER REPAIR  5/6/2009    Anterior repair with Prolift mesh.  BREAST SURGERY Right 1994    biopsy, benign    CARDIAC CATHETERIZATION  11/28/2000    COLECTOMY  10/2006    COLONOSCOPY  2006, 2009    x2    COLONOSCOPY  2016    Dr. Oscar Leon.   Repeat 2019    COLONOSCOPY  03/2019    Cecal Polyp  Done at [address] [place] [address] [address] EMBOLIZATION  10/2006     Family History   Problem Relation Age of Onset    High Blood Pressure Mother     Emphysema Father      Social History     Socioeconomic History    Marital status:      Spouse name: Not on file    Number of children: Not on file    Years of education: Not on file    Highest education level: Not on file   Occupational History    Not on file   Social Needs    Financial resource strain: Not on file    Food insecurity:     Worry: Not on file     Inability: Not on file    Transportation needs:     Medical: Not on file     Non-medical: Not on file   Tobacco Use    Smoking status: Never Smoker    Smokeless tobacco: Never Used    Tobacco comment: shivani rrt 7/15/17   Substance and Sexual Activity    Alcohol use: No    Drug use: No    Sexual activity: Not on file   Lifestyle    Physical activity:     Days per week: Not on file     Minutes per session: Not on file    Stress: Not on file   Relationships    Social connections:     Talks on phone: Not on file     Gets together: Not on file     Attends Buddhist service: Not on file     Active member of club or organization: Not on file     Attends meetings of clubs or organizations: Not on file     Relationship status: Not on file    Intimate partner violence:     Fear of current or ex partner: Not on file     Emotionally abused: Not on file     Physically abused: Not on file     Forced sexual activity: Not on file   Other Topics Concern    Not on file   Social History Narrative appreciated. Psych: The patient had a normal Orientation to: Time, Place, Person, and Situation  Mood and affect appropriate    Breast:  (Chest)  normal appearance, no masses or tenderness, Inspection negative, No nipple retraction or dimpling, No nipple discharge or bleeding, No axillary or supraclavicular adenopathy, Normal to palpation without dominant masses, negative findings: normal in size and symmetry, normal contour with no evidence of flattening or dimpling, skin normal, nipples everted without rashes or discharge, palpation negative for masses or nodules, no palpable axillary lymphadenopathy, positive findings: dense tissue bilaterally      Pelvic Exam:  External genitalia: normal general appearance  Urinary system: urethral meatus normal  Vaginal: atrophic mucosa  Cervix: normal appearance and thin prep PAP obtained  Adnexa: normal bimanual exam and non palpable  Uterus: normal single, nontender    Musculosk:  Normal Gait and station was noted. Digits were evaluated without abnormal findings. Range of motion, stability and strength were evaluated and found to be appropriate for the patients age. ASSESSMENT:      77 y.o. Annual   Diagnosis Orders   1. Dense breast     2. Well female exam with routine gynecological exam  [place] CA SCREEN;PELVIC/BREAST EXAM    [place] OBTAINING SCREEN PAP SMEAR   3. Screening for cervical cancer  PAP SMEAR   4. Visit for screening mammogram  MARILU DIGITAL SCREEN W CAD BILATERAL   5.  Menopause  DEXA AXIAL SKELETON W VERTEBRAL FX ASST        Chief Complaint   Patient presents with    Gynecologic Exam         Past Medical History:   Diagnosis Date    Arthritis     degenerative    Cervical spine degeneration     Chronic renal insufficiency, stage II (mild)     Colon polyps     history of     Constipation     Cystocele     Diverticulitis of colon     history of     Dyspepsia     history of     GERD (gastroesophageal reflux disease)     GI bleed Jan 2016    Head ache  Hyperlipidemia     Hypertension     Multiple melanocytic nevi     Mumps     Over weight     Panic attacks     history of     Paroxysmal atrial fibrillation (HCC)     Shingles     history of     Tachycardia     episodic         Patient Active Problem List   Diagnosis    Atrial fibrillation (HCC)    Essential hypertension    Back pain    Hyperlipidemia    Arthritis    Colon polyps    Constipation    Cervical spine degeneration    AVM (arteriovenous malformation) of colon    Non morbid obesity due to excess calories    Small bowel obstruction (HCC)    Chest pain    Chronic kidney disease, stage III (moderate) (HCC)          Hereditary Breast, Ovarian, Colon and Uterine Cancer screening Done. Tobacco & Secondary smoke risks reviewed; instructed on cessation and avoidance    PLAN:  Return in about 1 year (around 6/6/2020) for Annual Exam.  Return for annual exams  Mammograms every 1 year. If 34 yo and last mammogram was negative. Routine health maintenance per patients PCP. Orders Placed This Encounter   Procedures    MARILU DIGITAL SCREEN W CAD BILATERAL     Standing Status:   Future     Standing Expiration Date:   12/6/2020     Scheduling Instructions: On the day of the exam, the patient should not wear deodorant, lotion, or powder on the breast or underarm area. Wear a two piece outfit and be prepared to give information about prior breast procedures including mammograms, biopsies, or surgeries. If you've had mammograms done elsewhere, please request any previous mammogram films from those organizations prior to your appointment.      Order Specific Question:   Reason for exam:     Answer:   SCREENING MAMMOGRAM    DEXA AXIAL SKELETON W VERTEBRAL FX ASST     Standing Status:   Future     Standing Expiration Date:   12/6/2020     Order Specific Question:   Reason for exam:     Answer:   Screening osteoporosis    PAP SMEAR     Standing Status:   Future     Number of

## 2019-06-10 ENCOUNTER — OFFICE VISIT (OUTPATIENT)
Dept: FAMILY MEDICINE CLINIC | Age: 79
End: 2019-06-10
Payer: MEDICARE

## 2019-06-10 VITALS
DIASTOLIC BLOOD PRESSURE: 78 MMHG | OXYGEN SATURATION: 98 % | HEART RATE: 76 BPM | BODY MASS INDEX: 36.8 KG/M2 | WEIGHT: 200 LBS | SYSTOLIC BLOOD PRESSURE: 120 MMHG | HEIGHT: 62 IN

## 2019-06-10 DIAGNOSIS — E78.5 HYPERLIPIDEMIA, UNSPECIFIED HYPERLIPIDEMIA TYPE: ICD-10-CM

## 2019-06-10 DIAGNOSIS — Z00.00 ROUTINE GENERAL MEDICAL EXAMINATION AT A HEALTH CARE FACILITY: Primary | ICD-10-CM

## 2019-06-10 DIAGNOSIS — I10 ESSENTIAL HYPERTENSION: ICD-10-CM

## 2019-06-10 DIAGNOSIS — N18.30 CHRONIC KIDNEY DISEASE, STAGE III (MODERATE) (HCC): ICD-10-CM

## 2019-06-10 PROCEDURE — 4040F PNEUMOC VAC/ADMIN/RCVD: CPT | Performed by: FAMILY MEDICINE

## 2019-06-10 PROCEDURE — G0439 PPPS, SUBSEQ VISIT: HCPCS | Performed by: FAMILY MEDICINE

## 2019-06-10 PROCEDURE — 1123F ACP DISCUSS/DSCN MKR DOCD: CPT | Performed by: FAMILY MEDICINE

## 2019-06-10 ASSESSMENT — PATIENT HEALTH QUESTIONNAIRE - PHQ9
SUM OF ALL RESPONSES TO PHQ9 QUESTIONS 1 & 2: 0
SUM OF ALL RESPONSES TO PHQ QUESTIONS 1-9: 0
1. LITTLE INTEREST OR PLEASURE IN DOING THINGS: 0
2. FEELING DOWN, DEPRESSED OR HOPELESS: 0
SUM OF ALL RESPONSES TO PHQ QUESTIONS 1-9: 0

## 2019-06-10 ASSESSMENT — ANXIETY QUESTIONNAIRES: GAD7 TOTAL SCORE: 0

## 2019-06-10 ASSESSMENT — LIFESTYLE VARIABLES: HOW OFTEN DO YOU HAVE A DRINK CONTAINING ALCOHOL: 0

## 2019-06-10 ASSESSMENT — VISUAL ACUITY
OS_CC: 20/40
OD_CC: 20/40

## 2019-06-10 NOTE — PROGRESS NOTES
Medicare Annual Wellness Visit  Name: Chayo Newman Date: 6/10/2019   MRN: [id number] Sex: Female   Age: 77 y.o. Ethnicity: Non-/Non    : 1940 Race: [employer] is here for Medicare AWV    Screenings for behavioral, psychosocial and functional/safety risks, and cognitive dysfunction are all negative except as indicated below. These results, as well as other patient data from the [address] form, are documented in Flowsheets linked to this Encounter. Allergies   Allergen Reactions    Codeine Other (See Comments)     nightmares    Morphine Other (See Comments)     Nightmares      Other      Bee stings - swelling     Prior to Visit Medications    Medication Sig Taking? Authorizing Provider   propafenone (RYTHMOL) 225 MG tablet Take 1 tablet by mouth every 8 hours Yes Emmanuelle Myers MD   simvastatin (ZOCOR) 20 MG tablet Take 1 tablet by mouth every evening Yes Emmanuelle Myers MD   lisinopril-hydrochlorothiazide (PRINZIDE;ZESTORETIC) 20-25 MG per tablet take 1 tablet by mouth once daily Yes Nabor Carrillo MD   furosemide (LASIX) 20 MG tablet Take 1 tablet by mouth daily as needed (Edema)  Patient taking differently: Take 20 mg by mouth daily as needed (Edema) Indications: prn  Yes Nabor Carrillo MD   amLODIPine (NORVASC) 5 MG tablet Take 0.5 tablets by mouth daily Yes Nabor Carrillo MD   Cholecalciferol (VITAMIN D3) 1000 units CAPS Take by mouth Yes Historical Provider, MD   Cyanocobalamin (B-12) 1000 MCG CAPS Take by mouth Indications: High Binding Capacity of Iron to Red Blood Cells  Yes Historical Provider, MD   clotrimazole-betamethasone (LOTRISONE) 1-0.05 % cream Apply a thin film to the affected area 2 times daily. Yes FRANNIE Orozco CNP   nystatin (MYCOSTATIN) 189055 UNIT/GM powder Apply 3 times daily.  Yes FRANNIE Orozco CNP   metoprolol tartrate (LOPRESSOR) 25 MG tablet Take 0.5 tablets by mouth 2 times daily Yes Blaise MD Tim   polyethylene glycol (GLYCOLAX) powder Take 17 g by mouth daily Yes Historical Provider, MD   aspirin 81 MG tablet Take 81 mg by mouth daily. Yes Historical Provider, MD     Past Medical History:   Diagnosis Date    Arthritis     degenerative    Cervical spine degeneration     Chronic renal insufficiency, stage II (mild)     Colon polyps     history of     Constipation     Cystocele     Diverticulitis of colon     history of     Dyspepsia     history of     GERD (gastroesophageal reflux disease)     GI bleed Jan 2016    Head ache     Hyperlipidemia     Hypertension     Multiple melanocytic nevi     Mumps     Over weight     Panic attacks     history of     Paroxysmal atrial fibrillation (HCC)     Shingles     history of     Tachycardia     episodic     Past Surgical History:   Procedure Laterality Date    BACK SURGERY  1985, 1986    BLADDER REPAIR  5/6/2009    Anterior repair with Prolift mesh.  BREAST SURGERY Right 1994    biopsy, benign    CARDIAC CATHETERIZATION  11/28/2000    COLECTOMY  10/2006    COLONOSCOPY  2006, 2009    x2    COLONOSCOPY  2016    Dr. Tyrese Romeo.   Repeat 2019    COLONOSCOPY  03/2019    Cecal Polyp  Done at [address] [place] [address] [address] EMBOLIZATION  10/2006     Family History   Problem Relation Age of Onset    High Blood Pressure Mother     Emphysema Father        CareTeam (Including outside providers/suppliers regularly involved in providing care):   Patient Care Team:  Juanjose Cameron MD as PCP - Kendall Shelby MD as PCP - [facility] Empaneled Provider  Catherine Romano MD as Consulting Physician (Cardiology)  FRANNIE Joshua - CNP (Nurse Practitioner)  Haider Rios MD as Surgeon (Colon and Rectal Surgery)    Wt Readings from Last 3 Encounters:   06/10/19 200 lb (90.7 kg)   06/06/19 202 lb (91.6 kg)   05/22/19 200 lb 3.2 oz (90.8 kg)     Vitals:    06/10/19 0827   BP: 120/78   Site: Right Upper Arm   Position: Sitting   Cuff Size: Large Adult   Pulse: 76   SpO2: 98%   Weight: 200 lb (90.7 kg)   Height: 5' 2\" (1.575 m)     Body mass index is 36.58 kg/m². Based upon direct observation of the patient, evaluation of cognition reveals recent and remote memory intact. Patient's complete Health Risk Assessment and screening values have been reviewed and are found in Flowsheets. The following problems were reviewed today and where indicated follow up appointments were made and/or referrals ordered. Positive Risk Factor Screenings with Interventions:     General Health:  General  In general, how would you say your health is?: Good  In the past 7 days, have you experienced any of the following? New or Increased Pain, New or Increased Fatigue, Loneliness, Social Isolation, Stress or Anger?: None of These  Do you get the social and emotional support that you need?: Yes  Do you have a Living Will?: (!) No  General Health Risk Interventions:  · No Living Will: additional information provided reviewed    Health Habits/Nutrition:  Health Habits/Nutrition  Do you exercise for at least 20 minutes 2-3 times per week?: (!) No  Have you lost any weight without trying in the past 3 months?: No  Do you eat fewer than 2 meals per day?: No  Have you seen a dentist within the past year?: Yes  Body mass index is 36.58 kg/m².   Health Habits/Nutrition Interventions:  · Inadequate physical activity:  patient agrees to exercise for at least 150 minutes/week    Hearing/Vision:  Hearing/Vision  Do you or your family notice any trouble with your hearing?: No  Do you have difficulty driving, watching TV, or doing any of your daily activities because of your eyesight?: No  Have you had an eye exam within the past year?: (!) No  Hearing/Vision Interventions:  · Reviewed eye care     Safety:  Safety  Do you have working smoke detectors?: Yes  Have all throw rugs been removed or fastened?: (!) No  Do you have non-slip mats in all bathtubs?: Yes  Do all of your stairways have a railing or banister?: Yes  Are your doorways, halls and stairs free of clutter?: Yes  Do you always fasten your seatbelt when you are in a car?: Yes  Safety Interventions:  · Home safety tips provided    Personalized Preventive Plan   Current Health Maintenance Status  Immunization History   Administered Date(s) Administered    Influenza Virus Vaccine 12/04/2009, 12/09/2011, 11/29/2012    Influenza, High Dose (Fluzone 65 yrs and older) 01/03/2014, 12/02/2014, 11/16/2016, 11/15/2017, 11/14/2018    Pneumococcal 13-valent Conjugate ([id number]) 06/06/2016    Pneumococcal Polysaccharide ([id number]) 03/02/2007    Zoster Live (Zostavax) 06/03/2014        Health Maintenance   Topic Date Due    DTaP/Tdap/Td vaccine (1 - Tdap) 10/29/1959    Shingles Vaccine (2 of 3) 07/29/2014    Potassium monitoring  12/07/2019    Creatinine monitoring  12/07/2019    Flu vaccine  Completed    Pneumococcal 65+ years Vaccine  Completed    DEXA (modify frequency per FRAX score)  Addressed     Recommendations for Preventive Services Due: see orders and patient instructions/AVS.  .   Recommended screening schedule for the next 5-10 years is provided to the patient in written form: see Patient Instructions/AVS.     no current problems with renal dysfunction  Will check labs and monitor  Shingles shot discussed   Encouraged a tdap

## 2019-06-10 NOTE — PATIENT INSTRUCTIONS
Patient Education        Tdap (Tetanus, Diphtheria, Pertussis) Vaccine: What You Need to Know  Why get vaccinated? Tetanus, diphtheria, and pertussis are very serious diseases. Tdap vaccine can protect us from these diseases. And Tdap vaccine given to pregnant women can protect  babies against pertussis. Tetanus (lockjaw) is rare in the [facility] today. It causes painful muscle tightening and stiffness, usually all over the body. · It can lead to tightening of muscles in the head and neck so you can't open your mouth, swallow, or sometimes even breathe. Tetanus kills about 1 out of 10 people who are infected even after receiving the best medical care. Diphtheria is also rare in the [place] today. It can cause a thick coating to form in the back of the throat. · It can lead to breathing problems, heart failure, paralysis, and death. Pertussis (whooping cough) causes severe coughing spells, which can cause difficulty breathing, vomiting, and disturbed sleep. · It can also lead to weight loss, incontinence, and rib fractures. Up to 2 in 100 adolescents and 5 in 100 adults with pertussis are hospitalized or have complications, which could include pneumonia or death. These diseases are caused by bacteria. Diphtheria and pertussis are spread from person to person through secretions from coughing or sneezing. Tetanus enters the body through cuts, scratches, or wounds. Before vaccines, as many as 200,000 cases of diphtheria, 200,000 cases of pertussis, and hundreds of cases of tetanus were reported in the [place] each year. Since vaccination began, reports of cases for tetanus and diphtheria have dropped by about 99% and for pertussis by about 80%. Tdap vaccine  The Tdap vaccine can protect adolescents and adults from tetanus, diphtheria, and pertussis. One dose of Tdap is routinely given at age 6 or 15. People who did not get Tdap at that age should get it as soon as possible.   Tdap is especially important for health care professionals and anyone having close contact with a baby younger than 12 months. Pregnant women should get a dose of Tdap during every pregnancy, to protect the  from pertussis. Infants are most at risk for severe, life-threatening complications from pertussis. Another vaccine, called Td, protects against tetanus and diphtheria, but not pertussis. A Td booster should be given every 10 years. Tdap may be given as one of these boosters if you have never gotten Tdap before. Tdap may also be given after a severe cut or burn to prevent tetanus infection. Your doctor or the person giving you the vaccine can give you more information. Tdap may safely be given at the same time as other vaccines. Some people should not get this vaccine  · A person who has ever had a life-threatening allergic reaction after a previous dose of any diphtheria-, tetanus-, or pertussis-containing vaccine, OR has a severe allergy to any part of this vaccine, should not get Tdap vaccine. Tell the person giving the vaccine about any severe allergies. · Anyone who had coma or long repeated seizures within 7 days after a childhood dose of DTP or DTaP, or a previous dose of Tdap, should not get Tdap, unless a cause other than the vaccine was found. They can still get Td. · Talk to your doctor if you:  ? Have seizures or another nervous system problem. ? Had severe pain or swelling after any vaccine containing diphtheria, tetanus, or pertussis. ? Ever had a condition called Guillain-Barré Syndrome (GBS). ? Aren't feeling well on the day the shot is scheduled. Risks  With any medicine, including vaccines, there is a chance of side effects. These are usually mild and go away on their own. Serious reactions are also possible but are rare. Most people who get Tdap vaccine do not have any problems with it.   Mild problems following Tdap  (Did not interfere with activities)  · Pain where the shot was given (about 3 in 4 adolescents or 2 in 3 adults)  · Redness or swelling where the shot was given (about 1 person in 5)  · Mild fever of at least 100.4°F (up to about 1 in 25 adolescents or 1 in 100 adults)  · Headache (about 3 or 4 people in 10)  · Tiredness (about 1 person in 3 or 4)  · Nausea, vomiting, diarrhea, stomachache (up to 1 in 4 adolescents or 1 in 10 adults)  · Chills, sore joints (about 1 person in 10)  · Body aches (about 1 person in 3 or 4)  · Rash, swollen glands (uncommon)  Moderate problems following Tdap  (Interfered with activities, but did not require medical attention)  · Pain where the shot was given (up to 1 in 5 or 6)  · Redness or swelling where the shot was given (up to about 1 in 16 adolescents or 1 in 12 adults)  · Fever over 102°F (about 1 in 100 adolescents or 1 in 250 adults)  · Headache (about 1 in 7 adolescents or 1 in 10 adults)  · Nausea, vomiting, diarrhea, stomachache (up to 1 to 3 people in 100)  · Swelling of the entire arm where the shot was given (up to about 1 in 500)  Severe problems following Tdap  (Unable to perform usual activities; required medical attention)  · Swelling, severe pain, bleeding and redness in the arm where the shot was given (rare)  Problems that could happen after any vaccine:  · People sometimes faint after a medical procedure, including vaccination. Sitting or lying down for about 15 minutes can help prevent fainting, and injuries caused by a fall. Tell your doctor if you feel dizzy or have vision changes or ringing in the ears. · Some people get severe pain in the shoulder and have difficulty moving the arm where a shot was given. This happens very rarely. · Any medication can cause a severe allergic reaction. Such reactions from a vaccine are very rare, estimated at fewer than 1 in a million doses, and would happen within a few minutes to a few hours after the vaccination.   As with any medicine, there is a very remote chance of a vaccine possible to meet your calcium requirement with diet alone, but a vitamin D supplement is usually necessary to meet this goal.  · When exposed to the sun, use a sunscreen that protects against both UVA and UVB radiation with an SPF of 30 or greater. Reapply every 2 to 3 hours or after sweating, drying off with a towel, or swimming. · Always wear a seat belt when traveling in a car. Always wear a helmet when riding a bicycle or motorcycle. Keeping Home a [facility]       As we get older, changes in balance, gait, strength, vision, hearing, and cognition make even the most youthful senior more prone to accidents. Falls are one of the leading health risks for older people. This increased risk of falling is related to:   Aging process (eg, decreased muscle strength, slowed reflexes)   Higher incidence of chronic health problems (eg, arthritis, diabetes) that may limit mobility, agility or sensory awareness   Side effects of medicine (eg, dizziness, blurred vision)especially medicines like prescription pain medicines and drugs used to treat mental health conditions   Depending on the brittleness of your bones, the consequences of a fall can be serious and long lasting. Home Life   Research by the Association of Aging formerly [facility]) shows that some home accidents among older adults can be prevented by making simple lifestyle changes and basic modifications and repairs to the home environment. Here are some lifestyle changes that experts recommend:   Have your hearing and vision checked regularly. Be sure to wear prescription glasses that are right for you. Speak to your doctor or pharmacist about the possible side effects of your medicines. A number of medicines can cause dizziness. If you have problems with sleep, talk to your doctor. Limit your intake of alcohol. If necessary, use a cane or walker to help maintain your balance. Wear supportive, rubber-soled shoes, even at home.  If you live in a region that gets According to the Association of Aging, bathrooms and ivet are the two most potentially hazardous rooms in your home. In the Kitchen    Be sure your stove is in proper working order and always make sure burners and the oven are off before you go out or go to sleep. Keep pots on the back burners, turn handles away from the front of the stove, and keep stove clean and free of grease build-up. Kitchen ventilation systems and range exhausts should be working properly. Keep flammable objects such as towels and pot holders away from the cooking area except when in use. Make sure kitchen curtains are tied back. Move cords and appliances away from the sink and hot surfaces. If extension cords are needed, install wiring guides so they do not hang over the sink, range, or working areas. Look for coffee pots, kettles and toaster ovens with automatic shut-offs. Keep a mop handy in the kitchen so you can wipe up spills instantly. You should also have a small fire extinguisher. Arrange your kitchen with frequently used items on lower shelves to avoid the need to stand on a stepstool to reach them. Make sure countertops are well-lit to avoid injuries while cutting and preparing food. In the Bathroom    Use a non-slip mat or decals in the tub and shower, since wet, soapy tile or porcelain surfaces are extremely slippery. Make sure bathroom rugs are non-skid or tape them firmly to the floor. Bathtubs should have at least one, preferably two, grab bars, firmly attached to structural supports in the wall. (Do not use built-in soap holders or glass shower doors as grab bars.)    Tub seats fitted with non-slip material on the legs allow you to wash sitting down. For people with limited mobility, bathtub transfer benches allow you to slide safely into the tub. Raised toilet seats and toilet safety rails are helpful for those with knee or hip problems.     In the [facility]    Make sure you use a the mature years,\" such as bathing and mobility aids, household security devices, ergonomically designed knives and peelers, and faucet valves and knobs for temperature control. Medical supply stores and organizations are good sources of information about products that improve your quality of life and insure your safety. Last Reviewed: November 2009 Tayla Givens MD   Updated: 3/7/2011     ·        Advance Care Planning: Care Instructions  Your Care Instructions    It can be hard to live with an illness that cannot be cured. But if your health is getting worse, you may want to make decisions about end-of-life care. Planning for the end of your life does not mean that you are giving up. It is a way to make sure that your wishes are met. Clearly stating your wishes can make it easier for your loved ones. Making plans while you are still able may also ease your mind and make your final days less stressful and more meaningful. Follow-up care is a key part of your treatment and safety. Be sure to make and go to all appointments, and call your doctor if you are having problems. It's also a good idea to know your test results and keep a list of the medicines you take. What can you do to plan for the end of life? You can bring these issues up with your doctor. You do not need to wait until your doctor starts the conversation. You might start with \"I would not be willing to live with . Michaela Calixto \" When you complete this sentence it helps your doctor understand your wishes. Talk openly and honestly with your doctor. This is the best way to understand the decisions you will need to make as your health changes. Know that you can always change your mind. Ask your doctor about commonly used life-support measures. These include tube feedings, breathing machines, and fluids given through a vein (IV). Understanding these treatments will help you decide whether you want them.   You may choose to have these life-supporting treatments for a limited time. This allows a trial period to see whether they will help you. You may also decide that you want your doctor to take only certain measures to keep you alive. It is important to spell out these conditions so that your doctor and family understand them. Talk to your doctor about how long you are likely to live. He or she may be able to give you an idea of what usually happens with your specific illness. Think about preparing papers that state your wishes. This way there will not be any confusion about what you want. You can change your instructions at any time. Which papers should you prepare? Advance directives are legal papers that tell doctors how you want to be cared for at the end of your life. You do not need a  to write these papers. Ask your doctor or your state [facility] department for information on how to write your advance directives. They may have the forms for each of these types of papers. Make sure your doctor has a copy of these on file, and give a copy to a family member or close friend. Consider a do-not-resuscitate order (DNR). This order asks that no extra treatments be done if your heart stops or you stop breathing. Extra treatments may include cardiopulmonary resuscitation (CPR), electrical shock to restart your heart, or a machine to breathe for you. If you decide to have a DNR order, ask your doctor to explain and write it. Place the order in your home where everyone can easily see it. Consider a living will. A living will explains your wishes about life support and other treatments at the end of your life if you become unable to speak for yourself. Living pope tell doctors to use or not use treatments that would keep you alive. You must have one or two witnesses or a notary present when you sign this form. Consider a durable power of  for health care. This allows you to name a person to make decisions about your care if you are not able to.  Most people ask a close friend or family member. Talk to this person about the kinds of treatments you want and those that you do not want. Make sure this person understands your wishes. These legal papers are simple to change. Tell your doctor what you want to change, and ask him or her to make a note in your medical file. Give your family updated copies of the papers. Where can you learn more? Go to [email or web address]://Bizakpepiceweb.[email or web address]. org and sign in to your ClientShow account. Enter P184 in the [facility] [facility] box to learn more about \"Advance Care Planning: Care Instructions. \"     If you do not have an account, please click on the \"Sign Up Now\" link. Current as of: April 18, 2018  Content Version: 12.0  © 7056-9172 Healthwise, Incorporated. Care instructions adapted under license by [facility] ([facility]). If you have questions about a medical condition or this instruction, always ask your healthcare professional. [place] [address] any warranty or liability for your use of this information.     ·

## 2019-06-11 ENCOUNTER — HOSPITAL ENCOUNTER (OUTPATIENT)
Dept: NON INVASIVE DIAGNOSTICS | Age: 79
Discharge: HOME | End: 2019-06-11
Payer: MEDICARE

## 2019-06-11 ENCOUNTER — HOSPITAL ENCOUNTER (OUTPATIENT)
Dept: NUCLEAR MEDICINE | Age: 79
Discharge: HOME | End: 2019-06-13
Payer: MEDICARE

## 2019-06-11 DIAGNOSIS — R94.31 ABNORMAL ECG: ICD-10-CM

## 2019-06-11 DIAGNOSIS — R06.09 DYSPNEA ON EXERTION: ICD-10-CM

## 2019-06-11 LAB — CYTOLOGY REPORT: NORMAL

## 2019-06-11 PROCEDURE — A9500 TC99M SESTAMIBI: HCPCS | Performed by: INTERNAL MEDICINE

## 2019-06-11 PROCEDURE — 93017 CV STRESS TEST TRACING ONLY: CPT

## 2019-06-11 PROCEDURE — 6360000002 HC RX W HCPCS: Performed by: INTERNAL MEDICINE

## 2019-06-11 PROCEDURE — 78452 HT MUSCLE IMAGE SPECT MULT: CPT

## 2019-06-11 PROCEDURE — 3430000000 HC RX DIAGNOSTIC RADIOPHARMACEUTICAL: Performed by: INTERNAL MEDICINE

## 2019-06-11 RX ADMIN — TETRAKIS(2-METHOXYISOBUTYLISOCYANIDE)COPPER(I) TETRAFLUOROBORATE 10 MILLICURIE: 1 INJECTION, POWDER, LYOPHILIZED, FOR SOLUTION INTRAVENOUS at 09:30

## 2019-06-11 RX ADMIN — REGADENOSON 0.4 MG: 0.08 INJECTION, SOLUTION INTRAVENOUS at 09:26

## 2019-06-11 RX ADMIN — TETRAKIS(2-METHOXYISOBUTYLISOCYANIDE)COPPER(I) TETRAFLUOROBORATE 30 MILLICURIE: 1 INJECTION, POWDER, LYOPHILIZED, FOR SOLUTION INTRAVENOUS at 09:30

## 2019-06-11 NOTE — PROGRESS NOTES
Patient Name:  Silvio Aragon MRN:  [id number]   :  1940  Age:  77 y.o. Sex: female   Ordering Provider: Torey Landeros MD  Referring Provider: Annell Aase, MD  Primary Care Provider:  Annell Aase, MD     Indications: Abnormal ECG, Dyspnea     Medications:     Current Outpatient Medications:     propafenone (RYTHMOL) 225 MG tablet, Take 1 tablet by mouth every 8 hours, Disp: 270 tablet, Rfl: 3    simvastatin (ZOCOR) 20 MG tablet, Take 1 tablet by mouth every evening, Disp: 90 tablet, Rfl: 4    lisinopril-hydrochlorothiazide (PRINZIDE;ZESTORETIC) 20-25 MG per tablet, take 1 tablet by mouth once daily, Disp: 90 tablet, Rfl: 3    furosemide (LASIX) 20 MG tablet, Take 1 tablet by mouth daily as needed (Edema) (Patient taking differently: Take 20 mg by mouth daily as needed (Edema) Indications: prn ), Disp: 90 tablet, Rfl: 3    amLODIPine (NORVASC) 5 MG tablet, Take 0.5 tablets by mouth daily, Disp: 45 tablet, Rfl: 3    Cholecalciferol (VITAMIN D3) 1000 units CAPS, Take by mouth, Disp: , Rfl:     Cyanocobalamin (B-12) 1000 MCG CAPS, Take by mouth Indications: High Binding Capacity of Iron to Red Blood Cells , Disp: , Rfl:     clotrimazole-betamethasone (LOTRISONE) 1-0.05 % cream, Apply a thin film to the affected area 2 times daily. , Disp: 45 g, Rfl: 1    nystatin (MYCOSTATIN) 488567 UNIT/GM powder, Apply 3 times daily. , Disp: 60 g, Rfl: 5    metoprolol tartrate (LOPRESSOR) 25 MG tablet, Take 0.5 tablets by mouth 2 times daily, Disp: 90 tablet, Rfl: 3    polyethylene glycol (GLYCOLAX) powder, Take 17 g by mouth daily, Disp: , Rfl:     aspirin 81 MG tablet, Take 81 mg by mouth daily. , Disp: , Rfl:     Current Facility-Administered Medications:     regadenoson (LEXISCAN) injection 0.4 mg, 0.4 mg, Intravenous, Once, Frieda Jurado MD    Facility-Administered Medications Ordered in Other Encounters:     technetium sestamibi (CARDIOLITE) injection 30 millicurie, 30 millicurie, Intravenous, ONCE Greg AN MD    technetium sestamibi (CARDIOLITE) injection 10 millicurie, 10 millicurie, Intravenous, ONCE Greg AN MD      ----------------------------------------------------------------------------------------------------------                Izola Roche 0.4 mg injected over 10 seconds. IV Cardiolite injected 20 seconds post Lexiscan injection. Heart Rate:  57  Resting Blood Pressure:  143/70   ----------------------------------------------------------------------------------------------------------     HR BP   1 min 73 138/65   2 min     3 min 72 130/63   4 min     5 min 71 128/60   6 min     7 min 68 128/62   8 min     9 min 68 126/60   10 min       Symptoms:  Chest Pain:  No      Nausea:  No     Headache:  No    Shortness of Breath:  Yes     Other:  No      Electronically signed by Denver Kitten, RN on 6/11/19 at 9:17 AM  ----------------------------------------------------------------------------------------------------------  Resting EKG:  NSR INCOMPLETE RBBB , non-specific st changes     Arrhythmias:  NONE     EKG Changes:       Maximum Changes:       Leads with maximum changes:       EKG returned to baseline  minutes in recovery. Interpretation:       STRESS EKG INCONCLUSIVE FOR ISCHEMIA DUE TO ABNORMAL BASELINE   EKG. NO CHEST PAIN       NUCLEAR SCAN TO FOLLOW          Supervising Physician:  DR. Samy Loo

## 2019-06-12 ENCOUNTER — TELEPHONE (OUTPATIENT)
Dept: CARDIOLOGY | Age: 79
End: 2019-06-12

## 2019-06-12 PROCEDURE — 93018 CV STRESS TEST I&R ONLY: CPT | Performed by: INTERNAL MEDICINE

## 2019-06-12 NOTE — PROCEDURES
[address]                 [address] [place] [id number]                              CARDIAC STRESS TEST    PATIENT NAME: Awilda Bowden                 :        1940  MED REC NO:   [id number]                             ROOM:  ACCOUNT NO:   [de-identified]                           ADMIT DATE: 2019  PROVIDER:     Kriss White    DATE OF STUDY:  2019    STRESS TEST    Ordering Provider: Linda Reeves MD    Primary Care Provider: Bianca Garcia MD    Patient's Age: 77     Height: 5 feet 2 inches  Weight: 200 pounds    INDICATION:  Abnormal EKG, shortness of breath    Lexiscan 0.4 mg injected over 10 seconds. IV Cardiolite injected 20 seconds post Lexiscan injection. Heart Rate: 57 Resting blood pressure:  143/70              HR   BP  1 min          73   138/65  2 min  3 min          72   130/63  4 min  5 min          71   128/60  6 min  7 min          68   128/62  8 min  9 min          68   126/60  10 min    Symptoms:  Chest Pain: No  Nausea: No  Headache:  No  Shortness of breath: Yes  Other: No    Resting EKG:  Normal sinus rhythm, incomplete right bundle branch block,  nonspecific ST changes    Arrhythmias: None    EKG changes:    Maximum changes:    Leads with maximum changes:    EKG returned to baseline minutes in recovery    INTERPRETATION:  1. Stress EKG inconclusive for ischemia due to abnormal baseline EKG. 2. No chest pain. 3. Nuclear scan to follow. Nuclear Myocardial Perfusion Imaging (SPECT)    TESTING METHOD  STRESS:   Lexiscan  AGENT:    Cardiolite  REST:     Injection Date:  2019  Time:  08  Amount:  11.6 mCi  STRESS:   Injection Date:  2019  Time:  910  Amount:  33.8 mCi  IMAGE TIME:    Rest:  0850  Stress:  1015    EF:  70%  TID:  1.0  LHR:  0.43    FINDINGS:  Ischemia (Reversible Defect): Yes-very small area lateral mild ischemia.   Infarction (Irreversible Defect):  No  Ejection fraction Calculated: Normal  Segmental wall motion:   Normal  Diastolic LV Compliance (Stiffness):    Normal    IMPRESSION:  1. Very small area with mild ischemia laterally. 2. LVEF 70%.       Keron Corey    D: 06/12/2019 6:39:54       T: 06/12/2019 6:40:56     [email or web address]  Job#: [id number]     Doc#: Unknown    CC:  Manuelito Olivia

## 2019-06-14 ENCOUNTER — HOSPITAL ENCOUNTER (OUTPATIENT)
Dept: LAB | Age: 79
Setting detail: SPECIMEN
Discharge: HOME | End: 2019-06-14
Payer: MEDICARE

## 2019-06-14 DIAGNOSIS — E78.5 HYPERLIPIDEMIA, UNSPECIFIED HYPERLIPIDEMIA TYPE: ICD-10-CM

## 2019-06-14 DIAGNOSIS — I10 ESSENTIAL HYPERTENSION: ICD-10-CM

## 2019-06-14 LAB
-: ABNORMAL
ABSOLUTE EOS #: 0.1 K/UL (ref 0–0.4)
ABSOLUTE IMMATURE GRANULOCYTE: ABNORMAL K/UL (ref 0–0.3)
ABSOLUTE LYMPH #: 1.2 K/UL (ref 1–4.8)
ABSOLUTE MONO #: 0.5 K/UL (ref 0.1–1.2)
AMORPHOUS: ABNORMAL
ANION GAP SERPL CALCULATED.3IONS-SCNC: 11 MMOL/L (ref 9–17)
BACTERIA: ABNORMAL
BASOPHILS # BLD: 1 % (ref 0–1)
BASOPHILS ABSOLUTE: 0.1 K/UL (ref 0–0.2)
BILIRUBIN URINE: NEGATIVE
BUN BLDV-MCNC: 20 MG/DL (ref 8–23)
BUN/CREAT BLD: 14 (ref 9–20)
CALCIUM SERPL-MCNC: 10.6 MG/DL (ref 8.6–10.4)
CASTS UA: ABNORMAL /LPF (ref 0–2)
CHLORIDE BLD-SCNC: 104 MMOL/L (ref 98–107)
CHOLESTEROL/HDL RATIO: 2.3
CHOLESTEROL: 162 MG/DL
CO2: 27 MMOL/L (ref 20–31)
COLOR: NORMAL
COMMENT UA: NORMAL
CREAT SERPL-MCNC: 1.4 MG/DL (ref 0.5–0.9)
CRYSTALS, UA: ABNORMAL /HPF
DIFFERENTIAL TYPE: ABNORMAL
EOSINOPHILS RELATIVE PERCENT: 3 % (ref 1–7)
EPITHELIAL CELLS UA: ABNORMAL /HPF (ref 0–5)
GFR AFRICAN AMERICAN: 44 ML/MIN
GFR NON-AFRICAN AMERICAN: 36 ML/MIN
GFR SERPL CREATININE-BSD FRML MDRD: ABNORMAL ML/MIN/{1.73_M2}
GFR SERPL CREATININE-BSD FRML MDRD: ABNORMAL ML/MIN/{1.73_M2}
GLUCOSE BLD-MCNC: 108 MG/DL (ref 70–99)
GLUCOSE URINE: NEGATIVE
HCT VFR BLD CALC: 35.5 % (ref 36–46)
HDLC SERPL-MCNC: 70 MG/DL
HEMOGLOBIN: 12.4 G/DL (ref 12–16)
IMMATURE GRANULOCYTES: ABNORMAL %
KETONES, URINE: NEGATIVE
LDL CHOLESTEROL: 73 MG/DL (ref 0–130)
LEUKOCYTE ESTERASE, URINE: NEGATIVE
LYMPHOCYTES # BLD: 22 % (ref 16–46)
MCH RBC QN AUTO: 33.1 PG (ref 26–34)
MCHC RBC AUTO-ENTMCNC: 34.9 G/DL (ref 31–37)
MCV RBC AUTO: 94.8 FL (ref 80–100)
MONOCYTES # BLD: 9 % (ref 4–11)
MUCUS: ABNORMAL
NITRITE, URINE: NEGATIVE
NRBC AUTOMATED: ABNORMAL PER 100 WBC
OTHER OBSERVATIONS UA: ABNORMAL
PDW BLD-RTO: 13.4 % (ref 11–14.5)
PH UA: 6 (ref 5–6)
PLATELET # BLD: 203 K/UL (ref 140–450)
PLATELET ESTIMATE: ABNORMAL
PMV BLD AUTO: 9.5 FL (ref 6–12)
POTASSIUM SERPL-SCNC: 4.8 MMOL/L (ref 3.7–5.3)
PROTEIN UA: NEGATIVE
RBC # BLD: 3.75 M/UL (ref 4–5.2)
RBC # BLD: ABNORMAL 10*6/UL
RBC UA: ABNORMAL /HPF (ref 0–4)
RENAL EPITHELIAL, UA: ABNORMAL /HPF
SEG NEUTROPHILS: 65 % (ref 43–77)
SEGMENTED NEUTROPHILS ABSOLUTE COUNT: 3.5 K/UL (ref 1.8–7.7)
SODIUM BLD-SCNC: 142 MMOL/L (ref 135–144)
SPECIFIC GRAVITY UA: 1.01 (ref 1.01–1.02)
TRICHOMONAS: ABNORMAL
TRIGL SERPL-MCNC: 95 MG/DL
TURBIDITY: NORMAL
URINE HGB: NEGATIVE
UROBILINOGEN, URINE: NORMAL
VLDLC SERPL CALC-MCNC: NORMAL MG/DL (ref 1–30)
WBC # BLD: 5.3 K/UL (ref 3.5–11)
WBC # BLD: ABNORMAL 10*3/UL
WBC UA: ABNORMAL /HPF (ref 0–4)
YEAST: ABNORMAL

## 2019-06-14 PROCEDURE — 36415 COLL VENOUS BLD VENIPUNCTURE: CPT

## 2019-06-14 PROCEDURE — 80061 LIPID PANEL: CPT

## 2019-06-14 PROCEDURE — 80048 BASIC METABOLIC PNL TOTAL CA: CPT

## 2019-06-14 PROCEDURE — 85025 COMPLETE CBC W/AUTO DIFF WBC: CPT

## 2019-06-14 PROCEDURE — 81001 URINALYSIS AUTO W/SCOPE: CPT

## 2019-06-17 NOTE — TELEPHONE ENCOUNTER
Very small ischemia reported.  Recommend follow up in 2 weeks to re-evaluate symptoms and further management plan

## 2019-06-17 NOTE — TELEPHONE ENCOUNTER
Reviewed results with pt. Booked appt in 2 weeks. Recommended ER in the meantime for any symptoms. Pt stated understanding and accepts.

## 2019-07-02 ENCOUNTER — OFFICE VISIT (OUTPATIENT)
Dept: CARDIOLOGY | Age: 79
End: 2019-07-02
Payer: MEDICARE

## 2019-07-02 VITALS
BODY MASS INDEX: 36.8 KG/M2 | WEIGHT: 200 LBS | DIASTOLIC BLOOD PRESSURE: 60 MMHG | SYSTOLIC BLOOD PRESSURE: 120 MMHG | HEART RATE: 60 BPM | HEIGHT: 62 IN

## 2019-07-02 DIAGNOSIS — I48.0 PAF (PAROXYSMAL ATRIAL FIBRILLATION) (HCC): Primary | ICD-10-CM

## 2019-07-02 DIAGNOSIS — E78.5 HYPERLIPIDEMIA, UNSPECIFIED HYPERLIPIDEMIA TYPE: ICD-10-CM

## 2019-07-02 DIAGNOSIS — I51.89 DIASTOLIC DYSFUNCTION: ICD-10-CM

## 2019-07-02 DIAGNOSIS — I10 ESSENTIAL HYPERTENSION: ICD-10-CM

## 2019-07-02 DIAGNOSIS — R06.09 DYSPNEA ON EXERTION: ICD-10-CM

## 2019-07-02 PROCEDURE — G8417 CALC BMI ABV UP PARAM F/U: HCPCS | Performed by: INTERNAL MEDICINE

## 2019-07-02 PROCEDURE — G8427 DOCREV CUR MEDS BY ELIG CLIN: HCPCS | Performed by: INTERNAL MEDICINE

## 2019-07-02 PROCEDURE — 99213 OFFICE O/P EST LOW 20 MIN: CPT | Performed by: INTERNAL MEDICINE

## 2019-07-02 PROCEDURE — 1090F PRES/ABSN URINE INCON ASSESS: CPT | Performed by: INTERNAL MEDICINE

## 2019-07-02 PROCEDURE — 4040F PNEUMOC VAC/ADMIN/RCVD: CPT | Performed by: INTERNAL MEDICINE

## 2019-07-02 PROCEDURE — 1036F TOBACCO NON-USER: CPT | Performed by: INTERNAL MEDICINE

## 2019-07-02 PROCEDURE — G8399 PT W/DXA RESULTS DOCUMENT: HCPCS | Performed by: INTERNAL MEDICINE

## 2019-07-02 PROCEDURE — 1123F ACP DISCUSS/DSCN MKR DOCD: CPT | Performed by: INTERNAL MEDICINE

## 2019-07-02 NOTE — PROGRESS NOTES
(1.575 m)   Wt 200 lb (90.7 kg)   BMI 36.58 kg/m²     Constitutional and General Appearance: alert, cooperative, no distress and appears stated age  [de-identified]: PERRL, no cervical lymphadenopathy. No masses palpable. Normal oral mucosa  Respiratory:  · Normal excursion and expansion without use of accessory muscles  · Resp Auscultation: Good respiratory effort. No for increased work of breathing. On auscultation: clear to auscultation bilaterally  Cardiovascular:  · The apical impulse is not displaced  · Heart tones are crisp and normal. regular S1 and S2.  · Jugular venous pulsation Normal  · The carotid upstroke is normal in amplitude and contour without delay or bruit  · Peripheral pulses are symmetrical and full   Abdomen:   · No masses or tenderness  · Bowel sounds present  Extremities:  ·  No Cyanosis or Clubbing  ·  Lower extremity edema: No  ·  Skin: Warm and dry    Cardiac Data:  EKG:   lEXISCAN MILD ANT ISCHEMIA     Labs:     CBC: No results for input(s): WBC, HGB, HCT, PLT in the last 72 hours. BMP: No results for input(s): NA, K, CO2, BUN, CREATININE, LABGLOM, GLUCOSE in the last 72 hours. PT/INR: No results for input(s): PROTIME, INR in the last 72 hours. FASTING LIPID PANEL:  Lab Results   Component Value Date    CHOL 162 06/14/2019    HDL 70 06/14/2019    LDLCHOLESTEROL 73 06/14/2019    TRIG 95 06/14/2019    CHOLHDLRATIO 2.3 06/14/2019     LIVER PROFILE:No results for input(s): AST, ALT, LABALBU in the last 72 hours.       IMPRESSION:    Patient Active Problem List   Diagnosis    Atrial fibrillation ([address]    Essential hypertension    Back pain    Hyperlipidemia    Arthritis    Colon polyps    Constipation    Cervical spine degeneration    AVM (arteriovenous malformation) of colon    Non morbid obesity due to excess calories    Small bowel obstruction (HCC)    Chronic kidney disease, stage III (moderate) (HCC)       RECOMMENDATIONS:  MILD ANT ISCHEMIA , ACCORDING TO HER HAS NO SYMPTOMS , WILL WAIT , DOES NOT WANT CATH AT THIS TIME . PAF HAS BEEN IN NSR CONTINUE CURRENT MEDS     HYPERTENSION- WELL CONTROLLED, WILL CONTINUE CURRENT MEDICATIONS     DIASTOLIC DYSFUNCTION NO SIGN OF FLUID OVERLOAD     HYPERLIPIDEMIA- ON STATIN, NEEDS TO WATCH LIPID PROFILE AND LFT'S      DISCUSSED IN DETAILS ABOUT RISK MODIFICATION    RETURN VISIT IN 6 MONTHS, IF ANY SYMPTOM CHANGE PATIENT ADVISED TO GO TO THE EMERGENCY ROOM.           [address] Cardiology Consult           [phone]

## 2019-10-10 RX ORDER — AMLODIPINE BESYLATE 5 MG/1
TABLET ORAL
Qty: 45 TABLET | Refills: 3 | Status: SHIPPED | OUTPATIENT
Start: 2019-10-10 | End: 2020-08-28 | Stop reason: SDUPTHER

## 2019-10-10 RX ORDER — LISINOPRIL AND HYDROCHLOROTHIAZIDE 25; 20 MG/1; MG/1
TABLET ORAL
Qty: 90 TABLET | Refills: 3 | Status: SHIPPED | OUTPATIENT
Start: 2019-10-10 | End: 2020-08-28 | Stop reason: SDUPTHER

## 2019-11-01 ENCOUNTER — OFFICE VISIT (OUTPATIENT)
Dept: FAMILY MEDICINE CLINIC | Age: 79
End: 2019-11-01
Payer: MEDICARE

## 2019-11-01 VITALS
SYSTOLIC BLOOD PRESSURE: 118 MMHG | WEIGHT: 199.2 LBS | HEART RATE: 62 BPM | BODY MASS INDEX: 36.43 KG/M2 | OXYGEN SATURATION: 98 % | DIASTOLIC BLOOD PRESSURE: 66 MMHG

## 2019-11-01 DIAGNOSIS — M25.532 LEFT WRIST PAIN: Primary | ICD-10-CM

## 2019-11-01 PROCEDURE — G8417 CALC BMI ABV UP PARAM F/U: HCPCS | Performed by: FAMILY MEDICINE

## 2019-11-01 PROCEDURE — 99213 OFFICE O/P EST LOW 20 MIN: CPT | Performed by: FAMILY MEDICINE

## 2019-11-01 PROCEDURE — 1090F PRES/ABSN URINE INCON ASSESS: CPT | Performed by: FAMILY MEDICINE

## 2019-11-01 PROCEDURE — 1036F TOBACCO NON-USER: CPT | Performed by: FAMILY MEDICINE

## 2019-11-01 PROCEDURE — G8399 PT W/DXA RESULTS DOCUMENT: HCPCS | Performed by: FAMILY MEDICINE

## 2019-11-01 PROCEDURE — 1123F ACP DISCUSS/DSCN MKR DOCD: CPT | Performed by: FAMILY MEDICINE

## 2019-11-01 PROCEDURE — 4040F PNEUMOC VAC/ADMIN/RCVD: CPT | Performed by: FAMILY MEDICINE

## 2019-11-01 PROCEDURE — G8484 FLU IMMUNIZE NO ADMIN: HCPCS | Performed by: FAMILY MEDICINE

## 2019-11-01 PROCEDURE — G8427 DOCREV CUR MEDS BY ELIG CLIN: HCPCS | Performed by: FAMILY MEDICINE

## 2019-11-01 RX ORDER — PREDNISONE 10 MG/1
TABLET ORAL
Qty: 20 TABLET | Refills: 0 | Status: SHIPPED | OUTPATIENT
Start: 2019-11-01 | End: 2019-11-26 | Stop reason: SDUPTHER

## 2019-11-01 ASSESSMENT — ENCOUNTER SYMPTOMS
RESPIRATORY NEGATIVE: 1
BACK PAIN: 1

## 2019-11-08 ENCOUNTER — OFFICE VISIT (OUTPATIENT)
Dept: FAMILY MEDICINE CLINIC | Age: 79
End: 2019-11-08
Payer: MEDICARE

## 2019-11-08 VITALS
WEIGHT: 198 LBS | BODY MASS INDEX: 36.21 KG/M2 | DIASTOLIC BLOOD PRESSURE: 60 MMHG | OXYGEN SATURATION: 98 % | SYSTOLIC BLOOD PRESSURE: 124 MMHG | HEART RATE: 55 BPM

## 2019-11-08 DIAGNOSIS — M25.532 LEFT WRIST PAIN: Primary | ICD-10-CM

## 2019-11-08 PROCEDURE — 1090F PRES/ABSN URINE INCON ASSESS: CPT | Performed by: FAMILY MEDICINE

## 2019-11-08 PROCEDURE — 1123F ACP DISCUSS/DSCN MKR DOCD: CPT | Performed by: FAMILY MEDICINE

## 2019-11-08 PROCEDURE — G8417 CALC BMI ABV UP PARAM F/U: HCPCS | Performed by: FAMILY MEDICINE

## 2019-11-08 PROCEDURE — G8484 FLU IMMUNIZE NO ADMIN: HCPCS | Performed by: FAMILY MEDICINE

## 2019-11-08 PROCEDURE — G8399 PT W/DXA RESULTS DOCUMENT: HCPCS | Performed by: FAMILY MEDICINE

## 2019-11-08 PROCEDURE — 99213 OFFICE O/P EST LOW 20 MIN: CPT | Performed by: FAMILY MEDICINE

## 2019-11-08 PROCEDURE — G8427 DOCREV CUR MEDS BY ELIG CLIN: HCPCS | Performed by: FAMILY MEDICINE

## 2019-11-08 PROCEDURE — 1036F TOBACCO NON-USER: CPT | Performed by: FAMILY MEDICINE

## 2019-11-08 PROCEDURE — 4040F PNEUMOC VAC/ADMIN/RCVD: CPT | Performed by: FAMILY MEDICINE

## 2019-11-08 ASSESSMENT — ENCOUNTER SYMPTOMS
RESPIRATORY NEGATIVE: 1
GASTROINTESTINAL NEGATIVE: 1

## 2019-11-18 ENCOUNTER — TELEPHONE (OUTPATIENT)
Dept: FAMILY MEDICINE CLINIC | Age: 79
End: 2019-11-18

## 2019-11-22 ENCOUNTER — NURSE ONLY (OUTPATIENT)
Dept: LAB | Age: 79
End: 2019-11-22
Payer: MEDICARE

## 2019-11-22 DIAGNOSIS — Z23 NEED FOR INFLUENZA VACCINATION: Primary | ICD-10-CM

## 2019-11-22 PROCEDURE — 90653 IIV ADJUVANT VACCINE IM: CPT | Performed by: FAMILY MEDICINE

## 2019-11-22 PROCEDURE — G0008 ADMIN INFLUENZA VIRUS VAC: HCPCS | Performed by: FAMILY MEDICINE

## 2019-11-26 ENCOUNTER — OFFICE VISIT (OUTPATIENT)
Dept: PRIMARY CARE CLINIC | Age: 79
End: 2019-11-26
Payer: MEDICARE

## 2019-11-26 VITALS
BODY MASS INDEX: 36.14 KG/M2 | DIASTOLIC BLOOD PRESSURE: 68 MMHG | SYSTOLIC BLOOD PRESSURE: 118 MMHG | WEIGHT: 196.4 LBS | OXYGEN SATURATION: 98 % | HEIGHT: 62 IN | HEART RATE: 64 BPM | RESPIRATION RATE: 16 BRPM | TEMPERATURE: 97.8 F

## 2019-11-26 DIAGNOSIS — M54.42 ACUTE LEFT-SIDED LOW BACK PAIN WITH LEFT-SIDED SCIATICA: Primary | ICD-10-CM

## 2019-11-26 PROCEDURE — 1090F PRES/ABSN URINE INCON ASSESS: CPT | Performed by: NURSE PRACTITIONER

## 2019-11-26 PROCEDURE — 1036F TOBACCO NON-USER: CPT | Performed by: NURSE PRACTITIONER

## 2019-11-26 PROCEDURE — G8399 PT W/DXA RESULTS DOCUMENT: HCPCS | Performed by: NURSE PRACTITIONER

## 2019-11-26 PROCEDURE — G8427 DOCREV CUR MEDS BY ELIG CLIN: HCPCS | Performed by: NURSE PRACTITIONER

## 2019-11-26 PROCEDURE — G8417 CALC BMI ABV UP PARAM F/U: HCPCS | Performed by: NURSE PRACTITIONER

## 2019-11-26 PROCEDURE — G8482 FLU IMMUNIZE ORDER/ADMIN: HCPCS | Performed by: NURSE PRACTITIONER

## 2019-11-26 PROCEDURE — 99213 OFFICE O/P EST LOW 20 MIN: CPT | Performed by: NURSE PRACTITIONER

## 2019-11-26 PROCEDURE — 1123F ACP DISCUSS/DSCN MKR DOCD: CPT | Performed by: NURSE PRACTITIONER

## 2019-11-26 PROCEDURE — 4040F PNEUMOC VAC/ADMIN/RCVD: CPT | Performed by: NURSE PRACTITIONER

## 2019-11-26 RX ORDER — ACETAMINOPHEN 325 MG/1
650 TABLET ORAL EVERY 6 HOURS PRN
COMMUNITY

## 2019-11-26 RX ORDER — PREDNISONE 10 MG/1
TABLET ORAL
Qty: 20 TABLET | Refills: 0 | Status: SHIPPED | OUTPATIENT
Start: 2019-11-26 | End: 2020-01-07 | Stop reason: ALTCHOICE

## 2019-11-26 ASSESSMENT — ENCOUNTER SYMPTOMS
BOWEL INCONTINENCE: 0
BACK PAIN: 1
RESPIRATORY NEGATIVE: 1

## 2020-01-07 ENCOUNTER — HOSPITAL ENCOUNTER (OUTPATIENT)
Age: 80
Setting detail: SPECIMEN
Discharge: HOME | End: 2020-01-07
Payer: MEDICARE

## 2020-01-07 ENCOUNTER — OFFICE VISIT (OUTPATIENT)
Dept: FAMILY MEDICINE CLINIC | Age: 80
End: 2020-01-07
Payer: MEDICARE

## 2020-01-07 VITALS
BODY MASS INDEX: 36.21 KG/M2 | HEART RATE: 70 BPM | WEIGHT: 198 LBS | OXYGEN SATURATION: 98 % | DIASTOLIC BLOOD PRESSURE: 60 MMHG | SYSTOLIC BLOOD PRESSURE: 128 MMHG

## 2020-01-07 LAB
ABSOLUTE EOS #: 0.12 K/UL (ref 0–0.44)
ABSOLUTE IMMATURE GRANULOCYTE: 0.04 K/UL (ref 0–0.3)
ABSOLUTE LYMPH #: 1.41 K/UL (ref 1.1–3.7)
ABSOLUTE MONO #: 0.62 K/UL (ref 0.1–1.2)
ANION GAP SERPL CALCULATED.3IONS-SCNC: 13 MMOL/L (ref 9–17)
BASOPHILS # BLD: 1 % (ref 0–2)
BASOPHILS ABSOLUTE: 0.06 K/UL (ref 0–0.2)
BUN BLDV-MCNC: 28 MG/DL (ref 8–23)
BUN/CREAT BLD: 20 (ref 9–20)
C-REACTIVE PROTEIN: 37.1 MG/L (ref 0–5)
CALCIUM SERPL-MCNC: 10.2 MG/DL (ref 8.6–10.4)
CHLORIDE BLD-SCNC: 101 MMOL/L (ref 98–107)
CO2: 25 MMOL/L (ref 20–31)
CREAT SERPL-MCNC: 1.43 MG/DL (ref 0.5–0.9)
DIFFERENTIAL TYPE: ABNORMAL
EOSINOPHILS RELATIVE PERCENT: 1 % (ref 1–4)
GFR AFRICAN AMERICAN: 43 ML/MIN
GFR NON-AFRICAN AMERICAN: 35 ML/MIN
GFR SERPL CREATININE-BSD FRML MDRD: ABNORMAL ML/MIN/{1.73_M2}
GFR SERPL CREATININE-BSD FRML MDRD: ABNORMAL ML/MIN/{1.73_M2}
GLUCOSE BLD-MCNC: 111 MG/DL (ref 70–99)
HCT VFR BLD CALC: 35 % (ref 36.3–47.1)
HEMOGLOBIN: 11.5 G/DL (ref 11.9–15.1)
IMMATURE GRANULOCYTES: 0 %
LYMPHOCYTES # BLD: 15 % (ref 24–43)
MCH RBC QN AUTO: 30.7 PG (ref 25.2–33.5)
MCHC RBC AUTO-ENTMCNC: 32.9 G/DL (ref 25.2–33.5)
MCV RBC AUTO: 93.6 FL (ref 82.6–102.9)
MONOCYTES # BLD: 7 % (ref 3–12)
NRBC AUTOMATED: 0 PER 100 WBC
PDW BLD-RTO: 12.2 % (ref 11.8–14.4)
PLATELET # BLD: 307 K/UL (ref 138–453)
PLATELET ESTIMATE: ABNORMAL
PMV BLD AUTO: 10.6 FL (ref 8.1–13.5)
POTASSIUM SERPL-SCNC: 4.5 MMOL/L (ref 3.7–5.3)
RBC # BLD: 3.74 M/UL (ref 3.95–5.11)
RBC # BLD: ABNORMAL 10*6/UL
RHEUMATOID FACTOR: <10 IU/ML
SEDIMENTATION RATE, ERYTHROCYTE: 35 MM (ref 0–30)
SEG NEUTROPHILS: 76 % (ref 36–65)
SEGMENTED NEUTROPHILS ABSOLUTE COUNT: 7.14 K/UL (ref 1.5–8.1)
SODIUM BLD-SCNC: 139 MMOL/L (ref 135–144)
WBC # BLD: 9.4 K/UL (ref 3.5–11.3)
WBC # BLD: ABNORMAL 10*3/UL

## 2020-01-07 PROCEDURE — G8417 CALC BMI ABV UP PARAM F/U: HCPCS | Performed by: FAMILY MEDICINE

## 2020-01-07 PROCEDURE — 86431 RHEUMATOID FACTOR QUANT: CPT

## 2020-01-07 PROCEDURE — 85025 COMPLETE CBC W/AUTO DIFF WBC: CPT

## 2020-01-07 PROCEDURE — 4040F PNEUMOC VAC/ADMIN/RCVD: CPT | Performed by: FAMILY MEDICINE

## 2020-01-07 PROCEDURE — 36415 COLL VENOUS BLD VENIPUNCTURE: CPT

## 2020-01-07 PROCEDURE — 80048 BASIC METABOLIC PNL TOTAL CA: CPT

## 2020-01-07 PROCEDURE — 99213 OFFICE O/P EST LOW 20 MIN: CPT | Performed by: FAMILY MEDICINE

## 2020-01-07 PROCEDURE — G8482 FLU IMMUNIZE ORDER/ADMIN: HCPCS | Performed by: FAMILY MEDICINE

## 2020-01-07 PROCEDURE — G8399 PT W/DXA RESULTS DOCUMENT: HCPCS | Performed by: FAMILY MEDICINE

## 2020-01-07 PROCEDURE — 1123F ACP DISCUSS/DSCN MKR DOCD: CPT | Performed by: FAMILY MEDICINE

## 2020-01-07 PROCEDURE — 99211 OFF/OP EST MAY X REQ PHY/QHP: CPT | Performed by: FAMILY MEDICINE

## 2020-01-07 PROCEDURE — 1036F TOBACCO NON-USER: CPT | Performed by: FAMILY MEDICINE

## 2020-01-07 PROCEDURE — 86038 ANTINUCLEAR ANTIBODIES: CPT

## 2020-01-07 PROCEDURE — 85651 RBC SED RATE NONAUTOMATED: CPT

## 2020-01-07 PROCEDURE — G8427 DOCREV CUR MEDS BY ELIG CLIN: HCPCS | Performed by: FAMILY MEDICINE

## 2020-01-07 PROCEDURE — 86140 C-REACTIVE PROTEIN: CPT

## 2020-01-07 PROCEDURE — 1090F PRES/ABSN URINE INCON ASSESS: CPT | Performed by: FAMILY MEDICINE

## 2020-01-07 RX ORDER — PREDNISONE 10 MG/1
TABLET ORAL
Qty: 20 TABLET | Refills: 0 | Status: SHIPPED | OUTPATIENT
Start: 2020-01-07 | End: 2020-01-15 | Stop reason: ALTCHOICE

## 2020-01-07 ASSESSMENT — PATIENT HEALTH QUESTIONNAIRE - PHQ9
SUM OF ALL RESPONSES TO PHQ QUESTIONS 1-9: 0
1. LITTLE INTEREST OR PLEASURE IN DOING THINGS: 0
SUM OF ALL RESPONSES TO PHQ9 QUESTIONS 1 & 2: 0
SUM OF ALL RESPONSES TO PHQ QUESTIONS 1-9: 0
2. FEELING DOWN, DEPRESSED OR HOPELESS: 0

## 2020-01-07 ASSESSMENT — ENCOUNTER SYMPTOMS
RESPIRATORY NEGATIVE: 1
GASTROINTESTINAL NEGATIVE: 1

## 2020-01-07 NOTE — PROGRESS NOTES
Subjective:      Patient ID: Robert Ball is a [de-identified] y.o. female. Has noticed pain in wrists shoulders and neck. This flared up around gigi time. No fevers or chills. Does notice some morning swelling in her hands but no pain that persists. No tick exposure history. She did have one of her medicines changed (simvastatin)         Review of Systems   Constitutional: Negative. HENT: Negative. Respiratory: Negative. Cardiovascular: Negative. Gastrointestinal: Negative. Musculoskeletal: Positive for arthralgias and neck pain. Negative for myalgias. Skin: Negative. Objective:   Physical Exam  Vitals signs and nursing note reviewed. Constitutional:       Appearance: Normal appearance. HENT:      Head: Normocephalic and atraumatic. Neck:      Musculoskeletal: Decreased range of motion. Cardiovascular:      Rate and Rhythm: Normal rate and regular rhythm. Pulmonary:      Effort: Pulmonary effort is normal.      Breath sounds: No wheezing, rhonchi or rales. Musculoskeletal:      Right shoulder: She exhibits decreased range of motion and tenderness. Left shoulder: She exhibits decreased range of motion and tenderness. Right lower leg: No edema. Left lower leg: No edema. Comments: Slight joint effusions in both wrists and slight in both shoulders   No effusions noted in other joints  No redness or warmth noted in any joints    Neurological:      Mental Status: She is alert. Assessment:      Inflammatory arthritis         Plan:      She first started with some left wrist pain and then had some left hip pain both responded to prednisone. Will check labs and will give prednisone and if labs positive will need a rheumatology consult.          David Graves MD

## 2020-01-08 LAB — ANTI-NUCLEAR ANTIBODY (ANA): NEGATIVE

## 2020-01-15 ENCOUNTER — OFFICE VISIT (OUTPATIENT)
Dept: CARDIOLOGY | Age: 80
End: 2020-01-15
Payer: MEDICARE

## 2020-01-15 VITALS
HEIGHT: 62 IN | HEART RATE: 65 BPM | BODY MASS INDEX: 36.25 KG/M2 | SYSTOLIC BLOOD PRESSURE: 120 MMHG | DIASTOLIC BLOOD PRESSURE: 60 MMHG | WEIGHT: 197 LBS

## 2020-01-15 PROCEDURE — 93010 ELECTROCARDIOGRAM REPORT: CPT | Performed by: INTERNAL MEDICINE

## 2020-01-15 PROCEDURE — 4040F PNEUMOC VAC/ADMIN/RCVD: CPT | Performed by: INTERNAL MEDICINE

## 2020-01-15 PROCEDURE — 99214 OFFICE O/P EST MOD 30 MIN: CPT | Performed by: INTERNAL MEDICINE

## 2020-01-15 PROCEDURE — 1036F TOBACCO NON-USER: CPT | Performed by: INTERNAL MEDICINE

## 2020-01-15 PROCEDURE — G8399 PT W/DXA RESULTS DOCUMENT: HCPCS | Performed by: INTERNAL MEDICINE

## 2020-01-15 PROCEDURE — G8427 DOCREV CUR MEDS BY ELIG CLIN: HCPCS | Performed by: INTERNAL MEDICINE

## 2020-01-15 PROCEDURE — G8482 FLU IMMUNIZE ORDER/ADMIN: HCPCS | Performed by: INTERNAL MEDICINE

## 2020-01-15 PROCEDURE — 1123F ACP DISCUSS/DSCN MKR DOCD: CPT | Performed by: INTERNAL MEDICINE

## 2020-01-15 PROCEDURE — 93005 ELECTROCARDIOGRAM TRACING: CPT | Performed by: INTERNAL MEDICINE

## 2020-01-15 PROCEDURE — 99214 OFFICE O/P EST MOD 30 MIN: CPT

## 2020-01-15 PROCEDURE — G8417 CALC BMI ABV UP PARAM F/U: HCPCS | Performed by: INTERNAL MEDICINE

## 2020-01-15 PROCEDURE — 1090F PRES/ABSN URINE INCON ASSESS: CPT | Performed by: INTERNAL MEDICINE

## 2020-01-15 NOTE — PROGRESS NOTES
Today's Date: 1/15/2020  Patient's Name: Jason Pandya  Patient's age: [de-identified] y.o., 1940    Subjective:  Jason Pandya  is here for follow up. She denies any chest pain, no dyspnea, no PND, no syncope or pre-syncope, no orthopnea. Past Medical History:   has a past medical history of Arthritis, Cervical spine degeneration, Chronic renal insufficiency, stage II (mild), Colon polyps, Constipation, Cystocele, Diverticulitis of colon, Dyspepsia, GERD (gastroesophageal reflux disease), GI bleed, Head ache, Hyperlipidemia, Hypertension, Multiple melanocytic nevi, Mumps, Over weight, Panic attacks, Paroxysmal atrial fibrillation ([address] Shingles, and Tachycardia. Past Surgical History:   has a past surgical history that includes back surgery (1985, 1986); Breast surgery (Right, 1994); Cardiac catheterization (11/28/2000); Colonoscopy (2006, 2009); colectomy (10/2006); Uterine fibroid embolization (10/2006); bladder repair (5/6/2009); Colonoscopy (2016); and Colonoscopy (03/2019). Home Medications:  Prior to Admission medications    Medication Sig Start Date End Date Taking?  Authorizing Provider   predniSONE (DELTASONE) 10 MG tablet Take 2 po bid for 2 days then 3 po once a day for 2 days then 2 pills a day for 2 days then one pill a day till gone 1/7/20   Sharla Arambula MD   acetaminophen (TYLENOL) 325 MG tablet Take 650 mg by mouth every 6 hours as needed for Pain    Historical Provider, MD   amLODIPine (NORVASC) 5 MG tablet take 1/2 tablet by mouth once daily 10/10/19   Peter Coawrt MD   lisinopril-hydrochlorothiazide (PRINZIDE;ZESTORETIC) 20-25 MG per tablet take 1 tablet by mouth once daily 10/10/19   Peter Cowart MD   metoprolol tartrate (LOPRESSOR) 25 MG tablet Take 1/2 tab twice daily 9/17/19   Blaise Gottlieb DO   propafenone (RYTHMOL) 225 MG tablet Take 1 tablet by mouth every 8 hours 5/13/19   Sharla Arambula MD   simvastatin (ZOCOR) 20 MG tablet Take 1 tablet by mouth every evening hospitalized at [facility] followed by colonoscopy by Dr. Perri Wilkins noted to have AV malformation and benign polyps by biopsy and anticoagulation was discontinued, back on ASA. 7-Diastolic dysfunction: no signs of volume overload. 8-CKD        Plan of Treatment:      1-Continue current medical treatment with ASA, BB, ACEi, Diuretics, CCB, Rythmol, and statin. 2-Aggressive risk factors modifications. 3-Diet, exercise and loose weight. 4-Cardiac cath option discussed again. Risk and benefits discussed. She does not want to proceed at this point in time. 5-F/U in 6 months.       Odalys Barnes [id number]           [phone]

## 2020-03-12 RX ORDER — SIMVASTATIN 20 MG
TABLET ORAL
Qty: 90 TABLET | Refills: 3 | Status: SHIPPED | OUTPATIENT
Start: 2020-03-12 | End: 2021-03-08 | Stop reason: SDUPTHER

## 2020-03-12 NOTE — TELEPHONE ENCOUNTER
Christian Pisano is requesting a refill on the following medication(s):  Requested Prescriptions     Pending Prescriptions Disp Refills    simvastatin (ZOCOR) 20 MG tablet [Pharmacy Med Name: SIMVASTATIN  TAB 20MG] 90 tablet 3     Sig: TAKE 1 TABLET EVERY EVENING       Last Visit Date (If Applicable):  5/0/6332    Next Visit Date:    6/12/2020

## 2020-04-27 RX ORDER — PROPAFENONE HYDROCHLORIDE 225 MG/1
TABLET, FILM COATED ORAL
Qty: 270 TABLET | Refills: 3 | Status: SHIPPED | OUTPATIENT
Start: 2020-04-27 | End: 2020-11-23

## 2020-06-11 ENCOUNTER — HOSPITAL ENCOUNTER (OUTPATIENT)
Dept: BONE DENSITY | Age: 80
Discharge: HOME | End: 2020-06-13
Payer: MEDICARE

## 2020-06-11 ENCOUNTER — OFFICE VISIT (OUTPATIENT)
Dept: OBGYN | Age: 80
End: 2020-06-11
Payer: MEDICARE

## 2020-06-11 VITALS
HEART RATE: 66 BPM | HEIGHT: 62 IN | TEMPERATURE: 97.7 F | WEIGHT: 199 LBS | DIASTOLIC BLOOD PRESSURE: 72 MMHG | SYSTOLIC BLOOD PRESSURE: 126 MMHG | BODY MASS INDEX: 36.62 KG/M2

## 2020-06-11 PROCEDURE — 99214 OFFICE O/P EST MOD 30 MIN: CPT | Performed by: NURSE PRACTITIONER

## 2020-06-11 PROCEDURE — 4040F PNEUMOC VAC/ADMIN/RCVD: CPT | Performed by: NURSE PRACTITIONER

## 2020-06-11 PROCEDURE — 99212 OFFICE O/P EST SF 10 MIN: CPT

## 2020-06-11 PROCEDURE — G8417 CALC BMI ABV UP PARAM F/U: HCPCS | Performed by: NURSE PRACTITIONER

## 2020-06-11 PROCEDURE — 1123F ACP DISCUSS/DSCN MKR DOCD: CPT | Performed by: NURSE PRACTITIONER

## 2020-06-11 PROCEDURE — 1090F PRES/ABSN URINE INCON ASSESS: CPT | Performed by: NURSE PRACTITIONER

## 2020-06-11 PROCEDURE — G8399 PT W/DXA RESULTS DOCUMENT: HCPCS | Performed by: NURSE PRACTITIONER

## 2020-06-11 PROCEDURE — 99397 PER PM REEVAL EST PAT 65+ YR: CPT

## 2020-06-11 PROCEDURE — 1036F TOBACCO NON-USER: CPT | Performed by: NURSE PRACTITIONER

## 2020-06-11 PROCEDURE — G8427 DOCREV CUR MEDS BY ELIG CLIN: HCPCS | Performed by: NURSE PRACTITIONER

## 2020-06-11 PROCEDURE — 77085 DXA BONE DENSITY AXL VRT FX: CPT

## 2020-06-11 NOTE — PROGRESS NOTES
[facility]  2020              [de-identified] y.o. Chief Complaint   Patient presents with    Gynecologic Exam         No LMP recorded. Patient is postmenopausal.           No referring provider defined for this encounter. HPI :Annual Exam  Patient presents for annual exam.  Counseling on healthy lifestyle reviewed, as well as the need for self breast exam. We reviewed the need for [place] exercises. We discussed and reviewed the need for routine screenings and immunization updates when appropriate. Discussed bone health. Reviewed adequate calcium and vitamin D requirements with emphasis on dietary calcium as opposed to large quantities of supplements. Discussed osteoporosis prevention, fall prevention, fracture risks, weight bearing exercise and upper body strengthening. Osteopenia on lase DEXA. Scheduled for another in the coming weeks    Performs monthly self breast exams. Denies vaginal bleeding or spotting and is aware of the need to report any she would note in the future. The patient is not sexually active. last pap: was normal, last mammogram: was normal  The patient has regular exercise: yes .  We did review the need for and frequency of both weight bearing and strengthening as tolerated by the patient. ________________________________________________________________________  OB History    Para Term  AB Living   4 4 4 0 0 4   SAB TAB Ectopic Molar Multiple Live Births   0 0 0 0 0 0      # Outcome Date GA Lbr Juanito/2nd Weight Sex Delivery Anes PTL Lv   4 Term     F Vag-Spont      3 Term     M Vag-Spont      2 Term     M Vag-Spont      1 Term     F Vag-Spont        Past Medical History:   Diagnosis Date    Arthritis     degenerative    Cervical spine degeneration     Chronic renal insufficiency, stage II (mild)     Colon polyps     history of     Constipation     Cystocele     Diverticulitis of colon     history of     Dyspepsia     history of     GERD (gastroesophageal postmenopausal.  Hormone Exposure: No    Family History of Breast, Ovarian , Colon or Uterine Cancer: Yes      Preventative Health Testing:  Date of Last Pap Smear: 2019  Date of Last Mammogram: 2019  Date of Last Colonoscopy: 2019  Date of Last Bone Density: 2015  ________________________________________________________________________      Review Of Systems:  General ROS:  negative  Hematological and Lymphatic ROS:negative   Breast ROS: negative  Cardiovascular ROS: negative  Respiratory ROS: negative   Gastrointestinal ROS: negative  Genito-Urinary ROS: negative  Psychological ROS: negative  Neurological ROS: negative  Musculoskeletal ROS: negative  Dermatological ROS: negative                                                                                                                                                                                   PHYSICAL Exam:     Constitutional:  Blood pressure 126/72, pulse 66, temperature 97.7 °F (36.5 °C), temperature source Temporal, height 5' 1.5\" (1.562 m), weight 199 lb (90.3 kg), not currently breastfeeding. General Appearance: This  is a well Developed, well Nourished, well groomed female. Her BMI was reviewed. Skin:  There was a Normal Inspection of the skin without rashes or lesions. There were no rashes. (Papular, Maculopapular, Hives, Pustular, Macular)     There were no lesions (Ulcers, Erythema, Abn. Appearing Nevi)      Lymphatic:  No Lymph Nodes were Palpable in the neck , axilla or groin. Neck and EENT:  The neck was supple. There were no masses   The thyroid was not enlarged and had no masses. PERRLA, Nares Patent No Masses    Respiratory: The lungs were auscultated and found to be clear. There were no rales, rhonchi or wheezes. There was a good respiratory effort. Cardiovascular: The heart was in a regular rate and rhythm. . No S3 or S4. There was no murmur appreciated. Extremities:   The patients extremities were without

## 2020-06-20 ENCOUNTER — HOSPITAL ENCOUNTER (OUTPATIENT)
Dept: MAMMOGRAPHY | Age: 80
Discharge: HOME | End: 2020-06-22
Payer: MEDICARE

## 2020-06-20 PROCEDURE — 77063 BREAST TOMOSYNTHESIS BI: CPT

## 2020-07-01 ENCOUNTER — HOSPITAL ENCOUNTER (OUTPATIENT)
Dept: LAB | Age: 80
Discharge: HOME | End: 2020-07-01
Payer: MEDICARE

## 2020-07-01 LAB — VITAMIN D 25-HYDROXY: 37.2 NG/ML (ref 30–100)

## 2020-07-01 PROCEDURE — 36415 COLL VENOUS BLD VENIPUNCTURE: CPT

## 2020-07-01 PROCEDURE — 82306 VITAMIN D 25 HYDROXY: CPT

## 2020-07-09 ENCOUNTER — OFFICE VISIT (OUTPATIENT)
Dept: OBGYN | Age: 80
End: 2020-07-09
Payer: MEDICARE

## 2020-07-09 VITALS
TEMPERATURE: 98.1 F | HEART RATE: 64 BPM | BODY MASS INDEX: 36.99 KG/M2 | SYSTOLIC BLOOD PRESSURE: 122 MMHG | DIASTOLIC BLOOD PRESSURE: 78 MMHG | WEIGHT: 201 LBS | HEIGHT: 62 IN

## 2020-07-09 PROBLEM — M85.80 OSTEOPENIA: Status: ACTIVE | Noted: 2020-07-09

## 2020-07-09 PROCEDURE — 99213 OFFICE O/P EST LOW 20 MIN: CPT | Performed by: NURSE PRACTITIONER

## 2020-07-09 PROCEDURE — 1123F ACP DISCUSS/DSCN MKR DOCD: CPT | Performed by: NURSE PRACTITIONER

## 2020-07-09 PROCEDURE — 1036F TOBACCO NON-USER: CPT | Performed by: NURSE PRACTITIONER

## 2020-07-09 PROCEDURE — G8399 PT W/DXA RESULTS DOCUMENT: HCPCS | Performed by: NURSE PRACTITIONER

## 2020-07-09 PROCEDURE — G8427 DOCREV CUR MEDS BY ELIG CLIN: HCPCS | Performed by: NURSE PRACTITIONER

## 2020-07-09 PROCEDURE — G8417 CALC BMI ABV UP PARAM F/U: HCPCS | Performed by: NURSE PRACTITIONER

## 2020-07-09 PROCEDURE — 99213 OFFICE O/P EST LOW 20 MIN: CPT

## 2020-07-09 PROCEDURE — 1090F PRES/ABSN URINE INCON ASSESS: CPT | Performed by: NURSE PRACTITIONER

## 2020-07-09 PROCEDURE — 4040F PNEUMOC VAC/ADMIN/RCVD: CPT | Performed by: NURSE PRACTITIONER

## 2020-07-09 NOTE — PROGRESS NOTES
Subjective:  [facility] presents for DEXA which showed osteopenia. Reviewed report, T Scores, and fracture risk. Discussed the need for adequate calcium (preferably dietary) and Vitamin D along with weight bearing, strengthening, fall prevention, and balance exercise. Literature was provided. Patient verbalized understanding and was receptive to all information. Patient Active Problem List   Diagnosis    Atrial fibrillation [address] [address]    Essential hypertension    Back pain    Hyperlipidemia    Arthritis    Colon polyps    Constipation    Cervical spine degeneration    AVM (arteriovenous malformation) of colon    Non morbid obesity due to excess calories    Small bowel obstruction (HCC)    Chronic kidney disease, stage III (moderate) (HCC)    Osteopenia     Problem list reviewed as part of this encounter. Medication list reviewed and updated. See nursing note. History of present illness reviewed in detail and expanded by me. Review Of Systems:  General ROS:  negative  Hematological and Lymphatic ROS:negative   Breast ROS: negative  Cardiovascular ROS: negative  Respiratory ROS: negative   Gastrointestinal ROS: negative  Genito-Urinary ROS: negative  Psychological ROS: negative  Neurological ROS: negative  Musculoskeletal ROS: negative  Dermatological ROS: negative                                                                                                                                          Objective:  Vitals:    07/09/20 1200   BP: 122/78   Pulse: 64   Temp: 98.1 °F (36.7 °C)     Alert and oriented. No examination was performed. 100% of the encounter was spent in counseling, education, and coordination of care. 15 minutes spent face to face. Assessment:  Encounter Diagnosis   Name Primary?  Osteopenia, unspecified location Yes       Plan:  See orders.   Orders Placed This Encounter   Procedures    Vitamin D 25 Hydroxy     Standing Status:   Future     Standing Expiration Date:   1/5/2021     New Prescriptions    No medications on file     There are no Patient Instructions on file for this visit.     (Please note that portions of this note were completed with a voice-recognition program. Efforts were made to edit the dictations but occasionally words are mis-transcribed.)

## 2020-07-20 ENCOUNTER — OFFICE VISIT (OUTPATIENT)
Dept: FAMILY MEDICINE CLINIC | Age: 80
End: 2020-07-20
Payer: MEDICARE

## 2020-07-20 ENCOUNTER — HOSPITAL ENCOUNTER (OUTPATIENT)
Age: 80
Setting detail: SPECIMEN
Discharge: HOME | End: 2020-07-20
Payer: MEDICARE

## 2020-07-20 VITALS
WEIGHT: 200 LBS | BODY MASS INDEX: 36.8 KG/M2 | DIASTOLIC BLOOD PRESSURE: 82 MMHG | RESPIRATION RATE: 16 BRPM | SYSTOLIC BLOOD PRESSURE: 128 MMHG | HEART RATE: 66 BPM | OXYGEN SATURATION: 99 % | HEIGHT: 62 IN

## 2020-07-20 LAB
ABSOLUTE EOS #: 0.14 K/UL (ref 0–0.44)
ABSOLUTE IMMATURE GRANULOCYTE: <0.03 K/UL (ref 0–0.3)
ABSOLUTE LYMPH #: 1.44 K/UL (ref 1.1–3.7)
ABSOLUTE MONO #: 0.52 K/UL (ref 0.1–1.2)
ALBUMIN SERPL-MCNC: 4.4 G/DL (ref 3.5–5.2)
ALBUMIN/GLOBULIN RATIO: 1.7 (ref 1–2.5)
ALP BLD-CCNC: 68 U/L (ref 35–104)
ALT SERPL-CCNC: 10 U/L (ref 5–33)
ANION GAP SERPL CALCULATED.3IONS-SCNC: 14 MMOL/L (ref 9–17)
AST SERPL-CCNC: 14 U/L
BASOPHILS # BLD: 1 % (ref 0–2)
BASOPHILS ABSOLUTE: 0.05 K/UL (ref 0–0.2)
BILIRUB SERPL-MCNC: 0.63 MG/DL (ref 0.3–1.2)
BUN BLDV-MCNC: 24 MG/DL (ref 8–23)
BUN/CREAT BLD: 15 (ref 9–20)
CALCIUM SERPL-MCNC: 10.1 MG/DL (ref 8.6–10.4)
CHLORIDE BLD-SCNC: 101 MMOL/L (ref 98–107)
CHOLESTEROL/HDL RATIO: 2.3
CHOLESTEROL: 164 MG/DL
CO2: 26 MMOL/L (ref 20–31)
CREAT SERPL-MCNC: 1.65 MG/DL (ref 0.5–0.9)
DIFFERENTIAL TYPE: ABNORMAL
EOSINOPHILS RELATIVE PERCENT: 2 % (ref 1–4)
GFR AFRICAN AMERICAN: 36 ML/MIN
GFR NON-AFRICAN AMERICAN: 30 ML/MIN
GFR SERPL CREATININE-BSD FRML MDRD: ABNORMAL ML/MIN/{1.73_M2}
GFR SERPL CREATININE-BSD FRML MDRD: ABNORMAL ML/MIN/{1.73_M2}
GLUCOSE BLD-MCNC: 111 MG/DL (ref 70–99)
HCT VFR BLD CALC: 36.6 % (ref 36.3–47.1)
HDLC SERPL-MCNC: 70 MG/DL
HEMOGLOBIN: 12.5 G/DL (ref 11.9–15.1)
IMMATURE GRANULOCYTES: 0 %
LDL CHOLESTEROL: 74 MG/DL (ref 0–130)
LYMPHOCYTES # BLD: 18 % (ref 24–43)
MCH RBC QN AUTO: 31.6 PG (ref 25.2–33.5)
MCHC RBC AUTO-ENTMCNC: 34.2 G/DL (ref 25.2–33.5)
MCV RBC AUTO: 92.7 FL (ref 82.6–102.9)
MONOCYTES # BLD: 6 % (ref 3–12)
NRBC AUTOMATED: 0 PER 100 WBC
PDW BLD-RTO: 12.3 % (ref 11.8–14.4)
PLATELET # BLD: 233 K/UL (ref 138–453)
PLATELET ESTIMATE: ABNORMAL
PMV BLD AUTO: 11.6 FL (ref 8.1–13.5)
POTASSIUM SERPL-SCNC: 4.3 MMOL/L (ref 3.7–5.3)
RBC # BLD: 3.95 M/UL (ref 3.95–5.11)
RBC # BLD: ABNORMAL 10*6/UL
SEG NEUTROPHILS: 73 % (ref 36–65)
SEGMENTED NEUTROPHILS ABSOLUTE COUNT: 5.94 K/UL (ref 1.5–8.1)
SODIUM BLD-SCNC: 141 MMOL/L (ref 135–144)
TOTAL PROTEIN: 7 G/DL (ref 6.4–8.3)
TRIGL SERPL-MCNC: 98 MG/DL
VLDLC SERPL CALC-MCNC: NORMAL MG/DL (ref 1–30)
WBC # BLD: 8.1 K/UL (ref 3.5–11.3)
WBC # BLD: ABNORMAL 10*3/UL

## 2020-07-20 PROCEDURE — 4040F PNEUMOC VAC/ADMIN/RCVD: CPT | Performed by: FAMILY MEDICINE

## 2020-07-20 PROCEDURE — 1123F ACP DISCUSS/DSCN MKR DOCD: CPT | Performed by: FAMILY MEDICINE

## 2020-07-20 PROCEDURE — 80061 LIPID PANEL: CPT

## 2020-07-20 PROCEDURE — 80053 COMPREHEN METABOLIC PANEL: CPT

## 2020-07-20 PROCEDURE — G0439 PPPS, SUBSEQ VISIT: HCPCS | Performed by: FAMILY MEDICINE

## 2020-07-20 PROCEDURE — 36415 COLL VENOUS BLD VENIPUNCTURE: CPT

## 2020-07-20 PROCEDURE — 85025 COMPLETE CBC W/AUTO DIFF WBC: CPT

## 2020-07-20 ASSESSMENT — PATIENT HEALTH QUESTIONNAIRE - PHQ9
SUM OF ALL RESPONSES TO PHQ QUESTIONS 1-9: 0
SUM OF ALL RESPONSES TO PHQ QUESTIONS 1-9: 0

## 2020-07-20 ASSESSMENT — VISUAL ACUITY
OD_CC: 20/40
OS_CC: 20/30

## 2020-07-20 ASSESSMENT — LIFESTYLE VARIABLES: HOW OFTEN DO YOU HAVE A DRINK CONTAINING ALCOHOL: 0

## 2020-07-20 NOTE — PATIENT INSTRUCTIONS
Personalized Preventive Plan for [place] - 7/20/2020  Medicare offers a range of preventive health benefits. Some of the tests and screenings are paid in full while other may be subject to a deductible, co-insurance, and/or copay. Some of these benefits include a comprehensive review of your medical history including lifestyle, illnesses that may run in your family, and various assessments and screenings as appropriate. After reviewing your medical record and screening and assessments performed today your provider may have ordered immunizations, labs, imaging, and/or referrals for you. A list of these orders (if applicable) as well as your Preventive Care list are included within your After Visit Summary for your review. Other Preventive Recommendations:    · A preventive eye exam performed by an eye specialist is recommended every 1-2 years to screen for glaucoma; cataracts, macular degeneration, and other eye disorders. · A preventive dental visit is recommended every 6 months. · Try to get at least 150 minutes of exercise per week or 10,000 steps per day on a pedometer . · Order or download the FREE \"Exercise & Physical Activity: Your Everyday Guide\" from [employer]. Call 2-543.114.7310 or search [employer] online. · You need 3556-0511 mg of calcium and 4669-9875 IU of vitamin D per day. It is possible to meet your calcium requirement with diet alone, but a vitamin D supplement is usually necessary to meet this goal.  · When exposed to the sun, use a sunscreen that protects against both UVA and UVB radiation with an SPF of 30 or greater. Reapply every 2 to 3 hours or after sweating, drying off with a towel, or swimming. · Always wear a seat belt when traveling in a car. Always wear a helmet when riding a bicycle or motorcycle.     Keeping Home a [facility]       As we get older, changes in balance, gait, strength, vision, hearing, and cognition make even the most youthful senior more prone to accidents. Falls are one of the leading health risks for older people. This increased risk of falling is related to:   Aging process (eg, decreased muscle strength, slowed reflexes)   Higher incidence of chronic health problems (eg, arthritis, diabetes) that may limit mobility, agility or sensory awareness   Side effects of medicine (eg, dizziness, blurred vision)especially medicines like prescription pain medicines and drugs used to treat mental health conditions   Depending on the brittleness of your bones, the consequences of a fall can be serious and long lasting. Home Life   Research by the Association of Aging [facility]) shows that some home accidents among older adults can be prevented by making simple lifestyle changes and basic modifications and repairs to the home environment. Here are some lifestyle changes that experts recommend:   Have your hearing and vision checked regularly. Be sure to wear prescription glasses that are right for you. Speak to your doctor or pharmacist about the possible side effects of your medicines. A number of medicines can cause dizziness. If you have problems with sleep, talk to your doctor. Limit your intake of alcohol. If necessary, use a cane or walker to help maintain your balance. Wear supportive, rubber-soled shoes, even at home. If you live in a region that gets wintry weather, you may want to put special cleats on your shoes to prevent you from slipping on the snow and ice. Exercise regularly to help maintain muscle tone, agility, and balance. Always hold the banister when going up or down stairs. Also, use  bars when getting in or out of the bath or shower, or using the toilet. To avoid dizziness, get up slowly from a lying down position. Sit up first, dangling your legs for a minute or two before rising to a standing position.    Overall Home Safety Check   According to the Consumer Product Safety Commision's \"Older Consumer Home Safety Checklist,\" it is important to check for potential hazards in each room. And remember, proper lighting is an essential factor in home safety. If you cannot see clearly, you are more likely to fall. Important questions to ask yourself include:   Are lamp, electric, extension, and telephone cords placed out of the flow of traffic and maintained in good condition? Have frayed cords been replaced? Are all small rugs and runners slip resistant? If not, you can secure them to the floor with a special double-sided carpet tape. Are smoke detectors properly locatedone on every floor of your home and one outside of every sleeping area? Are they in good working order? Are batteries replaced at least once a year? Do you have a well-maintained carbon monoxide detector outside every sleeping are in your home? Does your furniture layout leave plenty of space to maneuver between and around chairs, tables, beds, and sofas? Are hallways, stairs and passages between rooms well lit? Can you reach a lamp without getting out of bed? Are floor surfaces well maintained? Shag rugs, high-pile carpeting, tile floors, and polished wood floors can be particularly slippery. Stairs should always have handrails and be carpeted or fitted with a non-skid tread. Is your telephone easily reachable. Is the cord safely tucked away? Room by Room   According to the Association of Aging, bathrooms and ivet are the two most potentially hazardous rooms in your home. In the Kitchen    Be sure your stove is in proper working order and always make sure burners and the oven are off before you go out or go to sleep. Keep pots on the back burners, turn handles away from the front of the stove, and keep stove clean and free of grease build-up. Kitchen ventilation systems and range exhausts should be working properly.     Keep flammable objects such as towels and pot holders away from the cooking area except when in use. Make sure kitchen curtains are tied back. Move cords and appliances away from the sink and hot surfaces. If extension cords are needed, install wiring guides so they do not hang over the sink, range, or working areas. Look for coffee pots, kettles and toaster ovens with automatic shut-offs. Keep a mop handy in the kitchen so you can wipe up spills instantly. You should also have a small fire extinguisher. Arrange your kitchen with frequently used items on lower shelves to avoid the need to stand on a stepstool to reach them. Make sure countertops are well-lit to avoid injuries while cutting and preparing food. In the Bathroom    Use a non-slip mat or decals in the tub and shower, since wet, soapy tile or porcelain surfaces are extremely slippery. Make sure bathroom rugs are non-skid or tape them firmly to the floor. Bathtubs should have at least one, preferably two, grab bars, firmly attached to structural supports in the wall. (Do not use built-in soap holders or glass shower doors as grab bars.)    Tub seats fitted with non-slip material on the legs allow you to wash sitting down. For people with limited mobility, bathtub transfer benches allow you to slide safely into the tub. Raised toilet seats and toilet safety rails are helpful for those with knee or hip problems. In the [facility]    Make sure you use a nightlight and that the area around your bed is clear of potential obstacles. Be careful with electric blankets and never go to sleep with a heating pad, which can cause serious burns even if on a low setting. Use fire-resistant mattress covers and pillows, and NEVER smoke in bed. Keep a phone next to the bed that is programmed to dial 911 at the push of a button. If you have a chronic condition, you may want to sign on with an automatic call-in service.  Typically the system includes a small pendant that connects directly to an emergency medical voice-response system. You should also make arrangements to stay in contact with someonefriend, neighbor, family memberon a regular schedule. Fire Prevention   According to the [employer]them. (Smoke Alarms for Every) [facility], senior citizens are one of the two highest risk groups for death and serious injuries due to residential fires. When cooking, wear short-sleeved items, never a bulky long-sleeved robe. The [facility]'s Safety Checklist for Older Consumers emphasizes the importance of checking basements, garages, workshops and storage areas for fire hazards, such as volatile liquids, piles of old rags or clothing and overloaded circuits. Never smoke in bed or when lying down on a couch or recliner chair. Small portable electric or kerosene heaters are responsible for many home fires and should be used cautiously if at all. If you do use one, be sure to keep them away from flammable materials. In case of fire, make sure you have a pre-established emergency exit plan. Have a professional check your fireplace and other fuel-burning appliances yearly. Helping [employer]   Baby boomers entering the dale years will continue to see the development of new products to help older adults live safely and independently in spite of age-related changes. Making Life More Livable  , by Patricia Bradley, lists over 1,000 products for \"living well in the mature years,\" such as bathing and mobility aids, household security devices, ergonomically designed knives and peelers, and faucet valves and knobs for temperature control. Medical supply stores and organizations are good sources of information about products that improve your quality of life and insure your safety. Last Reviewed: November 2009 Jaxon Davidson MD   Updated: 3/7/2011     ·        Learning About Living Perroy  What is a living will? A living will, also called a declaration, is a legal form.  It tells your family and your doctor your wishes when you can't speak for yourself. It's used by the health professionals who will treat you as you near the end of your life or if you get seriously hurt or ill. If you put your wishes in writing, your loved ones and others will know what kind of care you want. They won't need to guess. This can ease your mind and be helpful to others. And you can change or cancel your living will at any time. A living will is not the same as an estate or property will. An estate will explains what you want to happen with your money and property after you die. How do you use it? A living will is used to describe the kinds of treatment or life support you want as you near the end of your life or if you get seriously hurt or ill. Keep these facts in mind about living pope. Your living will is used only if you can't speak or make decisions for yourself. Most often, one or more doctors must certify that you can't speak or decide for yourself before your living will takes effect. If you get better and can speak for yourself again, you can accept or refuse any treatment. It doesn't matter what you said in your living will. Some states may limit your right to refuse treatment in certain cases. For example, you may need to clearly state in your living will that you don't want artificial hydration and nutrition, such as being fed through a tube. Is a living will a legal document? A living will is a legal document. Each state has its own laws about living pope. And a living will may be called something else in your state. Here are some things to know about living pope. You don't need an  to complete a living will. But legal advice can be helpful if your state's laws are unclear. It can also help if your health history is complicated or your family can't agree on what should be in your living will. You can change your living will at any time.  Some people find that their wishes about end-of-life care change as their health changes. If you make big changes to your living will, complete a new form. If you move to another state, make sure that your living will is legal in the state where you now live. In most cases, doctors will respect your wishes even if you have a form from a different state. You might use a universal form that has been approved by many states. This kind of form can sometimes be filled out and stored online. Your digital copy will then be available wherever you have a connection to the internet. The doctors and nurses who need to treat you can find it right away. Your state may offer an online registry. This is another place where you can store your living will online. It's a good idea to get your living will notarized. This means using a person called a Cause.it to watch two people sign, or witness, your living will. What should you know when you create a living will? Here are some questions to ask yourself as you make your living will:  Do you know enough about life support methods that might be used? If not, talk to your doctor so you know what might be done if you can't breathe on your own, your heart stops, or you can't swallow. What things would you still want to be able to do after you receive life-support methods? Would you want to be able to walk? To speak? To eat on your own? To live without the help of machines? Do you want certain Hindu practices performed if you become very ill? If you have a choice, where do you want to be cared for? In your home? At a hospital or nursing home? If you have a choice at the end of your life, where would you prefer to die? At home? In a hospital or nursing home? Somewhere else? Would you prefer to be buried or cremated? Do you want your organs to be donated after you die? What should you do with your living will?   Make sure that your family members and your health care agent have copies of your living will (also called a at least 25years old. Knows you well and understands what makes life meaningful for you. Understands your Scientologist and moral values. Will do what you want, not what he or she wants. Will be able to make difficult choices at a stressful time. Will be able to refuse or stop treatment, if that is what you would want, even if you could die. Will be firm and confident with health professionals if needed. Will ask questions to get needed information. Lives near you or agrees to travel to you if needed. Your family may help you make medical decisions while you can still be part of that process. But it's important to choose one person to be your health care agent in case you aren't able to make decisions for yourself. If you don't fill out the legal form and name a health care agent, the decisions your family can make may be limited. A health care agent may be called something else in your state. Who will make decisions for you if you don't have a health care agent? If you don't have a health care agent or a living will, you may not get the care you want. Decisions may be made by family members who disagree about your medical care. Or decisions may be made by a medical professional who doesn't know you well. In some cases, a  makes the decisions. When you name a health care agent, it is very clear who has the power to make health decisions for you. How do you name a health care agent? You name your health care agent on a legal form. This form is usually called a medical power of . Ask your hospital, state bar association, or office on aging where to find these forms. You must sign the form to make it legal. Some states require you to get the form notarized. This means that a person called a  watches you sign the form and then he or she signs the form. Some states also require that two or more witnesses sign the form.   Be sure to tell your family members and doctors who your health care agent is. Where can you learn more? Go to [email or web address] and sign in to your [employer] account. Enter [id number] in the [email or web address] box to learn more about \"Learning About [address]. \"     If you do not have an account, please click on the \"Sign Up Now\" link. Current as of: December 9, 2019               Content Version: 12.5  © 1831-8340 Healthwise, Incorporated. Care instructions adapted under license by [facility] ([facility]). If you have questions about a medical condition or this instruction, always ask your healthcare professional. [address] any warranty or liability for your use of this information.     ·

## 2020-07-20 NOTE — PROGRESS NOTES
times daily. Yes FRANNIE Mccoy CNP   polyethylene glycol (GLYCOLAX) powder Take 17 g by mouth daily Yes Historical Provider, MD   aspirin 81 MG tablet Take 81 mg by mouth daily. Yes Historical Provider, MD       Past Medical History:   Diagnosis Date    Arthritis     degenerative    Cervical spine degeneration     Chronic renal insufficiency, stage II (mild)     Colon polyps     history of     Constipation     Cystocele     Diverticulitis of colon     history of     Dyspepsia     history of     GERD (gastroesophageal reflux disease)     GI bleed Jan 2016    Head ache     Hyperlipidemia     Hypertension     Multiple melanocytic nevi     Mumps     Over weight     Panic attacks     history of     Paroxysmal atrial fibrillation (HCC)     Shingles     history of     Tachycardia     episodic       Past Surgical History:   Procedure Laterality Date    BACK SURGERY  1985, 1986    BLADDER REPAIR  5/6/2009    Anterior repair with Prolift mesh.  BREAST SURGERY Right 1994    biopsy, benign    CARDIAC CATHETERIZATION  11/28/2000    COLECTOMY  10/2006    COLONOSCOPY  2006, 2009    x2    COLONOSCOPY  2016    Dr. Chivo Seasr.   Repeat 2019    COLONOSCOPY  03/2019    Cecal Polyp  Done at [address] [place] [address] [address] EMBOLIZATION  10/2006       Family History   Problem Relation Age of Onset    High Blood Pressure Mother     Emphysema Father     Osteoporosis Sister     Heart Disease Sister         CHF       CareTeam (Including outside providers/suppliers regularly involved in providing care):   Patient Care Team:  Kita Browning MD as PCP - Reny Hdez MD as PCP - [facility] Empaneled Provider  Fátima Garrett MD as Consulting Physician (Cardiology)  FRANNIE Velez CNP (Nurse Practitioner)  Galdino Alcantara MD as Surgeon (Colon and Rectal Surgery)    Wt Readings from Last 3 Encounters:   07/20/20 200 lb (90.7 kg)   07/09/20 201 lb (91.2 kg)   06/11/20 199 lb (90.3 kg) Vitals:    07/20/20 0841   BP: 128/82   Pulse: 66   Resp: 16   SpO2: 99%   Weight: 200 lb (90.7 kg)   Height: 5' 1.5\" (1.562 m)     Body mass index is 37.18 kg/m². Based upon direct observation of the patient, evaluation of cognition reveals recent and remote memory intact. Patient's complete Health Risk Assessment and screening values have been reviewed and are found in Flowsheets. The following problems were reviewed today and where indicated follow up appointments were made and/or referrals ordered. Positive Risk Factor Screenings with Interventions:     General Health:  General  In general, how would you say your health is?: Fair  In the past 7 days, have you experienced any of the following? New or Increased Pain, New or Increased Fatigue, Loneliness, Social Isolation, Stress or Anger?: None of These  Do you get the social and emotional support that you need?: Yes  Do you have a Living Will?: (!) No  General Health Risk Interventions:  · No Living Will: additional information provided reviewed     Health Habits/Nutrition:  Health Habits/Nutrition  Do you exercise for at least 20 minutes 2-3 times per week?: (!) No  Have you lost any weight without trying in the past 3 months?: No  Do you eat fewer than 2 meals per day?: No  Have you seen a dentist within the past year?: Yes  Body mass index is 37.18 kg/m².   Health Habits/Nutrition Interventions:  · Reviewed the benefit of fitness for her overall well being     Hearing/Vision:   Hearing Screening    125Hz 250Hz 500Hz 1000Hz 2000Hz 3000Hz 4000Hz 6000Hz 8000Hz   Right ear:            Left ear:               Visual Acuity Screening    Right eye Left eye Both eyes   Without correction:      With correction: 20/40 20/30 20/30     Hearing/Vision  Do you or your family notice any trouble with your hearing?: (!) Yes  Do you have difficulty driving, watching TV, or doing any of your daily activities because of your eyesight?: No  Have you had an eye exam

## 2020-08-19 ENCOUNTER — OFFICE VISIT (OUTPATIENT)
Dept: CARDIOLOGY | Age: 80
End: 2020-08-19
Payer: MEDICARE

## 2020-08-19 VITALS
SYSTOLIC BLOOD PRESSURE: 134 MMHG | DIASTOLIC BLOOD PRESSURE: 60 MMHG | HEIGHT: 62 IN | BODY MASS INDEX: 36.8 KG/M2 | HEART RATE: 55 BPM | WEIGHT: 200 LBS

## 2020-08-19 PROCEDURE — G8417 CALC BMI ABV UP PARAM F/U: HCPCS | Performed by: INTERNAL MEDICINE

## 2020-08-19 PROCEDURE — 1036F TOBACCO NON-USER: CPT | Performed by: INTERNAL MEDICINE

## 2020-08-19 PROCEDURE — 99214 OFFICE O/P EST MOD 30 MIN: CPT | Performed by: INTERNAL MEDICINE

## 2020-08-19 PROCEDURE — 99214 OFFICE O/P EST MOD 30 MIN: CPT

## 2020-08-19 PROCEDURE — 1090F PRES/ABSN URINE INCON ASSESS: CPT | Performed by: INTERNAL MEDICINE

## 2020-08-19 PROCEDURE — 93005 ELECTROCARDIOGRAM TRACING: CPT | Performed by: INTERNAL MEDICINE

## 2020-08-19 PROCEDURE — 1123F ACP DISCUSS/DSCN MKR DOCD: CPT | Performed by: INTERNAL MEDICINE

## 2020-08-19 PROCEDURE — 93010 ELECTROCARDIOGRAM REPORT: CPT | Performed by: INTERNAL MEDICINE

## 2020-08-19 PROCEDURE — 4040F PNEUMOC VAC/ADMIN/RCVD: CPT | Performed by: INTERNAL MEDICINE

## 2020-08-19 PROCEDURE — G8399 PT W/DXA RESULTS DOCUMENT: HCPCS | Performed by: INTERNAL MEDICINE

## 2020-08-19 PROCEDURE — G8427 DOCREV CUR MEDS BY ELIG CLIN: HCPCS | Performed by: INTERNAL MEDICINE

## 2020-08-19 NOTE — PROGRESS NOTES
(VITAMIN D3) 1000 units CAPS Take by mouth Doubles her dose 3 days a week. Historical Provider, MD   Cyanocobalamin (B-12) 1000 MCG CAPS Take by mouth Indications: High Binding Capacity of Iron to Red Blood Cells     Historical Provider, MD   clotrimazole-betamethasone (LOTRISONE) 1-0.05 % cream Apply a thin film to the affected area 2 times daily. 6/4/18   Elease Marrow McDonel, APRN - CNP   nystatin (MYCOSTATIN) 802996 UNIT/GM powder Apply 3 times daily. 6/4/18   Elease Marrow McDonel, APRN - CNP   polyethylene glycol (GLYCOLAX) powder Take 17 g by mouth daily    Historical Provider, MD   aspirin 81 MG tablet Take 81 mg by mouth daily. Historical Provider, MD       Allergies:  Codeine; Morphine; and Other    Social History:   reports that she has never smoked. She has never used smokeless tobacco. She reports that she does not drink alcohol or use drugs. Family History: family history includes Emphysema in her father; Heart Disease in her sister; High Blood Pressure in her mother; Osteoporosis in her sister. No h/o sudden cardiac death. No for premature CAD    REVIEW OF SYSTEMS:    · Constitutional: there has been no unanticipated weight loss. There's been No change in energy level, No change in activity level. · Eyes: No visual changes or diplopia. No scleral icterus. · ENT: No Headaches, hearing loss or vertigo. No mouth sores or sore throat. · Cardiovascular: see above  · Respiratory: see above  · Gastrointestinal: No abdominal pain, appetite loss, blood in stools. · Genitourinary: No dysuria, trouble voiding, or hematuria. · Musculoskeletal:  No gait disturbance, No weakness or joint complaints. · Integumentary: No rash or pruritis. · Neurological: No headache or diplopia. No tingling  · Psychiatric: No anxiety, or depression. · Endocrine: No temperature intolerance. · Hematologic/Lymphatic: No abnormal bruising or bleeding, blood clots or swollen lymph nodes.   · Allergic/Immunologic: No nasal congestion or hives. PHYSICAL EXAM:      Vitals:    08/19/20 1326   BP: 134/60   Pulse: 55       Constitutional and General Appearance: alert, cooperative, no distress and appears stated age  HEENT: PERRL, no cervical lymphadenopathy. No masses palpable. Normal oral mucosa  Respiratory:  · Normal excursion and expansion without use of accessory muscles  · Resp Auscultation: Good respiratory effort. No for increased work of breathing. On auscultation: clear to auscultation bilaterally  Cardiovascular:  · Heart tones are crisp and normal. regular S1 and S2.  · Jugular venous pulsation Normal  · The carotid upstroke is normal in amplitude and contour without delay or bruit   Abdomen:   · soft  · Bowel sounds present  Extremities:  ·  No edema  Neurological:  · Alert and oriented. Cardiac Data:  EKG: SB, ST-T changes    Labs:     CBC: No results for input(s): WBC, HGB, HCT, PLT in the last 72 hours. BMP: No results for input(s): NA, K, CO2, BUN, CREATININE, LABGLOM, GLUCOSE in the last 72 hours. PT/INR: No results for input(s): PROTIME, INR in the last 72 hours. FASTING LIPID PANEL:  Lab Results   Component Value Date    HDL 70 07/20/2020    TRIG 98 07/20/2020     LIVER PROFILE:No results for input(s): AST, ALT, LABALBU in the last 72 hours. Nuclear stress test 6/11/19  IMPRESSION:  1. Very small area with mild ischemia laterally. 2. LVEF 70%.     Assessment / Acute Cardiac Problems:      1-PAF: Remains in NSR maintained on Rythmol. Last episode of a.fib was 2009. 2-HTN:   3-HLP: on statin. LDL 73 on 6/14/19.  4-Preserved LV systolic function. 5-Cardiac cath 2001 with no significant CAD. Nuclear stress test 6/11/19 showed very small mild ischemia laterally. Patient declined cardiac cath. 6-GI bleed hospitalized at [facility] followed by colonoscopy by Dr. 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   7-Diastolic dysfunction: no signs of volume overload. 8-CKD        Plan of Treatment:      1-Continue current medical treatment with ASA, BB, ACEi, Diuretics, CCB, Rythmol, and statin. 2-Aggressive risk factors modifications. 3-Diet, exercise and loose weight. 4-ECG today showed slight worsening of ST-T changes. Previously recommended cardiac cath due to small mild ischemia. She again does not want to proceed with it. She is counseled to seek immediate medical attention if she develops chest pain or dyspnea.   5-F/U in 6 months.       Odalys Taveras [facility]           [phone]

## 2020-08-28 RX ORDER — LISINOPRIL AND HYDROCHLOROTHIAZIDE 25; 20 MG/1; MG/1
TABLET ORAL
Qty: 90 TABLET | Refills: 3 | Status: SHIPPED | OUTPATIENT
Start: 2020-08-28 | End: 2020-11-23

## 2020-08-28 RX ORDER — AMLODIPINE BESYLATE 5 MG/1
TABLET ORAL
Qty: 45 TABLET | Refills: 3 | Status: SHIPPED | OUTPATIENT
Start: 2020-08-28 | End: 2020-11-23

## 2020-09-24 ENCOUNTER — NURSE ONLY (OUTPATIENT)
Dept: FAMILY MEDICINE CLINIC | Age: 80
End: 2020-09-24
Payer: MEDICARE

## 2020-09-24 PROCEDURE — 90653 IIV ADJUVANT VACCINE IM: CPT

## 2020-10-19 ENCOUNTER — TELEPHONE (OUTPATIENT)
Dept: FAMILY MEDICINE CLINIC | Age: 80
End: 2020-10-19

## 2020-10-19 NOTE — TELEPHONE ENCOUNTER
Patient aware and states that she will be completing tomorrow at [facility] office.

## 2020-10-20 ENCOUNTER — HOSPITAL ENCOUNTER (OUTPATIENT)
Age: 80
Setting detail: SPECIMEN
Discharge: HOME | End: 2020-10-20
Payer: MEDICARE

## 2020-10-20 LAB
ANION GAP SERPL CALCULATED.3IONS-SCNC: 11 MMOL/L (ref 9–17)
BUN BLDV-MCNC: 18 MG/DL (ref 8–23)
BUN/CREAT BLD: 13 (ref 9–20)
CALCIUM SERPL-MCNC: 9.8 MG/DL (ref 8.6–10.4)
CHLORIDE BLD-SCNC: 101 MMOL/L (ref 98–107)
CO2: 26 MMOL/L (ref 20–31)
CREAT SERPL-MCNC: 1.41 MG/DL (ref 0.5–0.9)
GFR AFRICAN AMERICAN: 43 ML/MIN
GFR NON-AFRICAN AMERICAN: 36 ML/MIN
GFR SERPL CREATININE-BSD FRML MDRD: ABNORMAL ML/MIN/{1.73_M2}
GFR SERPL CREATININE-BSD FRML MDRD: ABNORMAL ML/MIN/{1.73_M2}
GLUCOSE BLD-MCNC: 109 MG/DL (ref 70–99)
POTASSIUM SERPL-SCNC: 4.3 MMOL/L (ref 3.7–5.3)
SODIUM BLD-SCNC: 138 MMOL/L (ref 135–144)

## 2020-10-20 PROCEDURE — 80048 BASIC METABOLIC PNL TOTAL CA: CPT

## 2020-10-20 PROCEDURE — 36415 COLL VENOUS BLD VENIPUNCTURE: CPT

## 2020-11-19 ENCOUNTER — HOSPITAL ENCOUNTER (EMERGENCY)
Age: 80
Discharge: HOME | End: 2020-11-19
Attending: EMERGENCY MEDICINE
Payer: MEDICARE

## 2020-11-19 ENCOUNTER — APPOINTMENT (OUTPATIENT)
Dept: GENERAL RADIOLOGY | Age: 80
End: 2020-11-19
Payer: MEDICARE

## 2020-11-19 VITALS
HEART RATE: 79 BPM | RESPIRATION RATE: 16 BRPM | DIASTOLIC BLOOD PRESSURE: 75 MMHG | OXYGEN SATURATION: 98 % | SYSTOLIC BLOOD PRESSURE: 131 MMHG | TEMPERATURE: 97.5 F

## 2020-11-19 LAB
ABSOLUTE EOS #: 0.1 K/UL (ref 0–0.44)
ABSOLUTE IMMATURE GRANULOCYTE: <0.03 K/UL (ref 0–0.3)
ABSOLUTE LYMPH #: 1.43 K/UL (ref 1.1–3.7)
ABSOLUTE MONO #: 0.55 K/UL (ref 0.1–1.2)
ANION GAP SERPL CALCULATED.3IONS-SCNC: 12 MMOL/L (ref 9–17)
BASOPHILS # BLD: 1 % (ref 0–2)
BASOPHILS ABSOLUTE: 0.05 K/UL (ref 0–0.2)
BUN BLDV-MCNC: 28 MG/DL (ref 8–23)
BUN/CREAT BLD: 17 (ref 9–20)
CALCIUM SERPL-MCNC: 10 MG/DL (ref 8.6–10.4)
CHLORIDE BLD-SCNC: 102 MMOL/L (ref 98–107)
CO2: 26 MMOL/L (ref 20–31)
CREAT SERPL-MCNC: 1.63 MG/DL (ref 0.5–0.9)
DIFFERENTIAL TYPE: ABNORMAL
EOSINOPHILS RELATIVE PERCENT: 2 % (ref 1–4)
GFR AFRICAN AMERICAN: 37 ML/MIN
GFR NON-AFRICAN AMERICAN: 30 ML/MIN
GFR SERPL CREATININE-BSD FRML MDRD: ABNORMAL ML/MIN/{1.73_M2}
GFR SERPL CREATININE-BSD FRML MDRD: ABNORMAL ML/MIN/{1.73_M2}
GLUCOSE BLD-MCNC: 114 MG/DL (ref 70–99)
HCT VFR BLD CALC: 37.5 % (ref 36.3–47.1)
HEMOGLOBIN: 12.8 G/DL (ref 11.9–15.1)
IMMATURE GRANULOCYTES: 0 %
LYMPHOCYTES # BLD: 22 % (ref 24–43)
MCH RBC QN AUTO: 32 PG (ref 25.2–33.5)
MCHC RBC AUTO-ENTMCNC: 34.1 G/DL (ref 25.2–33.5)
MCV RBC AUTO: 93.8 FL (ref 82.6–102.9)
MONOCYTES # BLD: 8 % (ref 3–12)
NRBC AUTOMATED: 0 PER 100 WBC
PDW BLD-RTO: 11.9 % (ref 11.8–14.4)
PLATELET # BLD: 220 K/UL (ref 138–453)
PLATELET ESTIMATE: ABNORMAL
PMV BLD AUTO: 10.8 FL (ref 8.1–13.5)
POTASSIUM SERPL-SCNC: 4.5 MMOL/L (ref 3.7–5.3)
RBC # BLD: 4 M/UL (ref 3.95–5.11)
RBC # BLD: ABNORMAL 10*6/UL
SEG NEUTROPHILS: 67 % (ref 36–65)
SEGMENTED NEUTROPHILS ABSOLUTE COUNT: 4.49 K/UL (ref 1.5–8.1)
SODIUM BLD-SCNC: 140 MMOL/L (ref 135–144)
TROPONIN INTERP: NORMAL
TROPONIN T: NORMAL NG/ML
TROPONIN, HIGH SENSITIVITY: 11 NG/L (ref 0–14)
WBC # BLD: 6.6 K/UL (ref 3.5–11.3)
WBC # BLD: ABNORMAL 10*3/UL

## 2020-11-19 PROCEDURE — 85025 COMPLETE CBC W/AUTO DIFF WBC: CPT

## 2020-11-19 PROCEDURE — 71045 X-RAY EXAM CHEST 1 VIEW: CPT

## 2020-11-19 PROCEDURE — 99284 EMERGENCY DEPT VISIT MOD MDM: CPT

## 2020-11-19 PROCEDURE — 84484 ASSAY OF TROPONIN QUANT: CPT

## 2020-11-19 PROCEDURE — 80048 BASIC METABOLIC PNL TOTAL CA: CPT

## 2020-11-19 PROCEDURE — 2500000003 HC RX 250 WO HCPCS: Performed by: EMERGENCY MEDICINE

## 2020-11-19 PROCEDURE — 36415 COLL VENOUS BLD VENIPUNCTURE: CPT

## 2020-11-19 PROCEDURE — 96374 THER/PROPH/DIAG INJ IV PUSH: CPT

## 2020-11-19 PROCEDURE — 93005 ELECTROCARDIOGRAM TRACING: CPT | Performed by: EMERGENCY MEDICINE

## 2020-11-19 RX ORDER — DILTIAZEM HYDROCHLORIDE 5 MG/ML
10 INJECTION INTRAVENOUS ONCE
Status: COMPLETED | OUTPATIENT
Start: 2020-11-19 | End: 2020-11-19

## 2020-11-19 RX ADMIN — DILTIAZEM HYDROCHLORIDE 10 MG: 5 INJECTION INTRAVENOUS at 06:18

## 2020-11-19 ASSESSMENT — ENCOUNTER SYMPTOMS
RESPIRATORY NEGATIVE: 1
GASTROINTESTINAL NEGATIVE: 1
EYES NEGATIVE: 1
ALLERGIC/IMMUNOLOGIC NEGATIVE: 1

## 2020-11-19 NOTE — ED PROVIDER NOTES
eMERGENCY dEPARTMENT eNCOUnter      Pt Name: Hilton Briones  MRN: [id number]  [place] 1940  Date of evaluation: 11/19/2020      CHIEF COMPLAINT       Chief Complaint   Patient presents with    Irregular Heart Beat     since midnight          HISTORY OF [address] is a 79 y.o. female who presents to the emergency room for evaluation of dizziness and rapid heartbeat. Patient knows that she has atrial fibrillation and she is in atrial fibrillation right now, she has not taken her antiarrhythmic that she is normally do for it this morning. Patient denies chest pain, nausea vomiting diaphoresis or shortness of breath. REVIEW OF SYSTEMS       Review of Systems   Constitutional: Negative. HENT: Negative. Eyes: Negative. Respiratory: Negative. Cardiovascular: Negative. Gastrointestinal: Negative. Endocrine: Negative. Genitourinary: Negative. Musculoskeletal: Negative. Skin: Negative. Allergic/Immunologic: Negative. Neurological: Positive for dizziness. Hematological: Negative. Psychiatric/Behavioral: Negative. PAST MEDICAL HISTORY    has a past medical history of Arthritis, Cervical spine degeneration, Chronic renal insufficiency, stage II (mild), Colon polyps, Constipation, Cystocele, Diverticulitis of colon, Dyspepsia, GERD (gastroesophageal reflux disease), GI bleed, Head ache, Hyperlipidemia, Hypertension, Multiple melanocytic nevi, Mumps, Over weight, Panic attacks, Paroxysmal atrial fibrillation ([address] Shingles, and Tachycardia. SURGICAL HISTORY      has a past surgical history that includes back surgery (1985, 1986); Breast surgery (Right, 1994); Cardiac catheterization (11/28/2000); Colonoscopy (2006, 2009); colectomy (10/2006); Uterine fibroid embolization (10/2006); bladder repair (5/6/2009); Colonoscopy (2016); and Colonoscopy (03/2019).     CURRENT MEDICATIONS       Previous Medications    ACETAMINOPHEN (TYLENOL) 325 MG TABLET    Take 650 mg by mouth every 6 hours as needed for Pain    AMLODIPINE (NORVASC) 5 MG TABLET    take 1/2 tablet by mouth once daily    ASPIRIN 81 MG TABLET    Take 81 mg by mouth daily. CHOLECALCIFEROL (VITAMIN D3) 1000 UNITS CAPS    Take by mouth Doubles her dose 3 days a week. CLOTRIMAZOLE-BETAMETHASONE (LOTRISONE) 1-0.05 % CREAM    Apply a thin film to the affected area 2 times daily. CYANOCOBALAMIN (B-12) 1000 MCG CAPS    Take by mouth Indications: High Binding Capacity of Iron to Red Blood Cells     FUROSEMIDE (LASIX) 20 MG TABLET    Take 1 tablet by mouth daily as needed (Edema)    LISINOPRIL-HYDROCHLOROTHIAZIDE (PRINZIDE;ZESTORETIC) 20-25 MG PER TABLET    take 1 tablet by mouth once daily    METOPROLOL TARTRATE (LOPRESSOR) 25 MG TABLET    TAKE 1/2 TABLET TWICE A DAY    NYSTATIN (MYCOSTATIN) 066050 UNIT/GM POWDER    Apply 3 times daily. POLYETHYLENE GLYCOL (GLYCOLAX) POWDER    Take 17 g by mouth daily    PROPAFENONE (RYTHMOL) 225 MG TABLET    TAKE 1 TABLET EVERY 8 HOURS    SIMVASTATIN (ZOCOR) 20 MG TABLET    TAKE 1 TABLET EVERY EVENING       ALLERGIES     is allergic to codeine; morphine; and other. FAMILY HISTORY     She indicated that her mother is . She indicated that her father is . She indicated that her sister is . family history includes Emphysema in her father; Heart Disease in her sister; High Blood Pressure in her mother; Osteoporosis in her sister. SOCIAL HISTORY      reports that she has never smoked. She has never used smokeless tobacco. She reports that she does not drink alcohol or use drugs. PHYSICAL EXAM     INITIAL VITALS:  tympanic temperature is 97.5 °F (36.4 °C). Her blood pressure is 131/75 and her pulse is 79. Her respiration is 16 and oxygen saturation is 98%.        Constitutional: Alert, oriented x3, nontoxic, afebrile, answering questions appropriately, acting properly for age, in no acute distress  HEENT: Extraocular muscles intact, mucus membranes moist, TMs clear bilaterally, no posterior pharyngeal erythema or exudates, Pupils equal, round, reactive to light,   Neck: Trachea midline, Supple without lymphadenopathy, no posterior midline neck tenderness to palpation  Cardiovascular: Regular rhythm and rate no S3, S4, or murmurs  Respiratory: Clear to auscultation bilaterally no wheezes, rhonchi, rales, no respiratory distress  Gastrointestinal: Soft, nontender, nondistended, positive bowel sounds. No rebound, rigidity, or guarding. Musculoskeletal: No extremity pain or swelling  Neurologic: Moving all 4 extremities without difficulty there are no gross focal neurologic deficits  Skin: Warm and dry      DIFFERENTIAL DIAGNOSIS/ MDM:     Atrial fibrillation patient will be given some Cardizem to get a set of cardiac enzymes and some baseline labs and be reevaluated. DIAGNOSTIC RESULTS     EKG: All EKG's are interpreted by the Emergency Department Physician who either signs or Co-signs this chart in the absence of a cardiologist.    EKG is in atrial fibrillation running between 90 and 130 bpm no AR interval calculated QRS duration 0.104 QTC of 467 ms some rate related ST depression is noted but no obvious ST elevation.     Not indicated unless otherwise documented above    LABS:  Results for orders placed or performed during the hospital encounter of 23/20/13   Basic Metabolic Panel   Result Value Ref Range    Glucose 114 (H) 70 - 99 mg/dL    BUN 28 (H) 8 - 23 mg/dL    CREATININE 1.63 (H) 0.50 - 0.90 mg/dL    Bun/Cre Ratio 17 9 - 20    Calcium 10.0 8.6 - 10.4 mg/dL    Sodium 140 135 - 144 mmol/L    Potassium 4.5 3.7 - 5.3 mmol/L    Chloride 102 98 - 107 mmol/L    CO2 26 20 - 31 mmol/L    Anion Gap 12 9 - 17 mmol/L    GFR Non-African American 30 (L) >60 mL/min    GFR  37 (L) >60 mL/min    GFR Comment          GFR Staging NOT REPORTED    CBC Auto Differential   Result Value Ref Range    WBC 6.6 3.5 - 11.3 k/uL    RBC 4.00 3.95 - 5.11 m/uL    Hemoglobin 12.8 11.9 - 15.1 g/dL    Hematocrit 37.5 36.3 - 47.1 %    MCV 93.8 82.6 - 102.9 fL    MCH 32.0 25.2 - 33.5 pg    MCHC 34.1 (H) 25.2 - 33.5 g/dL    RDW 11.9 11.8 - 14.4 %    Platelets 324 704 - 608 k/uL    MPV 10.8 8.1 - 13.5 fL    NRBC Automated 0.0 0.0 per 100 WBC    Differential Type NOT REPORTED     Seg Neutrophils 67 (H) 36 - 65 %    Lymphocytes 22 (L) 24 - 43 %    Monocytes 8 3 - 12 %    Eosinophils % 2 1 - 4 %    Basophils 1 0 - 2 %    Immature Granulocytes 0 0 %    Segs Absolute 4.49 1.50 - 8.10 k/uL    Absolute Lymph # 1.43 1.10 - 3.70 k/uL    Absolute Mono # 0.55 0.10 - 1.20 k/uL    Absolute Eos # 0.10 0.00 - 0.44 k/uL    Basophils Absolute 0.05 0.00 - 0.20 k/uL    Absolute Immature Granulocyte <0.03 0.00 - 0.30 k/uL    WBC Morphology NOT REPORTED     RBC Morphology NOT REPORTED     Platelet Estimate NOT REPORTED    Troponin   Result Value Ref Range    Troponin, High Sensitivity 11 0 - 14 ng/L    Troponin T NOT REPORTED <0.03 ng/mL    Troponin Interp NOT REPORTED        Not indicated unless otherwise documented above    RADIOLOGY:   I reviewed the radiologist interpretations:  XR CHEST PORTABLE   Final Result   Mild cardiomegaly. Otherwise, unremarkable single upright portable AP view of the chest.             Not indicated unless otherwise documented above    EMERGENCY DEPARTMENT COURSE:     The patient was given the following medications:  Orders Placed This Encounter   Medications    dilTIAZem injection 10 mg        Vitals:    Vitals:    11/19/20 0615 11/19/20 0621 11/19/20 0626 11/19/20 0630   BP: (!) 147/68 (!) 152/128  131/75   Pulse: 138 114 89 79   Resp: 19 14 18 16   Temp:       TempSrc:       SpO2: 97% 99% 98% 98%     -------------------------  /75   Pulse 79   Temp 97.5 °F (36.4 °C) (Tympanic)   Resp 16   SpO2 98%         I have reviewed the disposition diagnosis with the patient and or their family/guardian.  I have answered their questions and given discharge instructions. They voiced understanding of these instructions and did not have any further questions or complaints. CRITICAL CARE:    None    CONSULTS:    None    PROCEDURES:    None      OARRS Report if indicated             FINAL IMPRESSION      1. Chronic atrial fibrillation (HCC) Stable, but not controlled   2. Dizziness          DISPOSITION/PLAN   DISPOSITION Decision To Discharge    I have reviewed the disposition diagnosis with the patient and or their family/guardian. I have answered their questions and given discharge instructions. They voiced understanding of these instructions and did not have any further questions or complaints. Reevaluation: The patient is feeling symptomatically much better now that her heart rate is under more control, we have instructed her to hold her first dose of Rythmol until she speaks to her family doctor and then they can decide whether or not she should go ahead and take the other 2 doses. Patient is stable for discharge home at this point in time her troponins are negative. X-ray just shows some mild cardiomegaly.       PATIENT REFERRED TO:  Soham Trevizo MD  [address] [place] [address] Percy Saab  [phone]    Today        DISCHARGE MEDICATIONS:  New Prescriptions    No medications on file       (Please note that portions of this note were completed with a voice recognition program.  Efforts were made to edit the dictations but occasionally words are mis-transcribed.)    Peterson MD  Attending Emergency Physician            Tiarra Ocampo MD  11/19/20 [id number]

## 2020-11-21 LAB
EKG ATRIAL RATE: 129 BPM
EKG Q-T INTERVAL: 384 MS
EKG QRS DURATION: 104 MS
EKG QTC CALCULATION (BAZETT): 467 MS
EKG R AXIS: 24 DEGREES
EKG T AXIS: 172 DEGREES
EKG VENTRICULAR RATE: 89 BPM

## 2020-11-23 ENCOUNTER — OFFICE VISIT (OUTPATIENT)
Dept: CARDIOLOGY | Age: 80
End: 2020-11-23
Payer: MEDICARE

## 2020-11-23 VITALS
SYSTOLIC BLOOD PRESSURE: 106 MMHG | HEIGHT: 62 IN | RESPIRATION RATE: 16 BRPM | WEIGHT: 198 LBS | DIASTOLIC BLOOD PRESSURE: 56 MMHG | HEART RATE: 102 BPM | BODY MASS INDEX: 36.44 KG/M2 | OXYGEN SATURATION: 99 %

## 2020-11-23 PROCEDURE — 1036F TOBACCO NON-USER: CPT | Performed by: INTERNAL MEDICINE

## 2020-11-23 PROCEDURE — 99213 OFFICE O/P EST LOW 20 MIN: CPT | Performed by: INTERNAL MEDICINE

## 2020-11-23 PROCEDURE — 99214 OFFICE O/P EST MOD 30 MIN: CPT | Performed by: INTERNAL MEDICINE

## 2020-11-23 PROCEDURE — 93010 ELECTROCARDIOGRAM REPORT: CPT | Performed by: INTERNAL MEDICINE

## 2020-11-23 PROCEDURE — G8482 FLU IMMUNIZE ORDER/ADMIN: HCPCS | Performed by: INTERNAL MEDICINE

## 2020-11-23 PROCEDURE — 1123F ACP DISCUSS/DSCN MKR DOCD: CPT | Performed by: INTERNAL MEDICINE

## 2020-11-23 PROCEDURE — 4040F PNEUMOC VAC/ADMIN/RCVD: CPT | Performed by: INTERNAL MEDICINE

## 2020-11-23 PROCEDURE — G8399 PT W/DXA RESULTS DOCUMENT: HCPCS | Performed by: INTERNAL MEDICINE

## 2020-11-23 PROCEDURE — G8427 DOCREV CUR MEDS BY ELIG CLIN: HCPCS | Performed by: INTERNAL MEDICINE

## 2020-11-23 PROCEDURE — G8417 CALC BMI ABV UP PARAM F/U: HCPCS | Performed by: INTERNAL MEDICINE

## 2020-11-23 PROCEDURE — 1090F PRES/ABSN URINE INCON ASSESS: CPT | Performed by: INTERNAL MEDICINE

## 2020-11-23 PROCEDURE — 93005 ELECTROCARDIOGRAM TRACING: CPT | Performed by: INTERNAL MEDICINE

## 2020-11-23 RX ORDER — AMIODARONE HYDROCHLORIDE 200 MG/1
200 TABLET ORAL 2 TIMES DAILY
Qty: 180 TABLET | Refills: 1 | Status: SHIPPED | OUTPATIENT
Start: 2020-11-23 | End: 2021-03-19 | Stop reason: DRUGHIGH

## 2020-11-23 SDOH — HEALTH STABILITY: MENTAL HEALTH: HOW OFTEN DO YOU HAVE A DRINK CONTAINING ALCOHOL?: NEVER

## 2020-11-23 NOTE — PATIENT INSTRUCTIONS
Nuclear Stress Test      Please allow 3 hours to complete this test.    Central Scheduling will call you to schedule this test. If you do not receive a call within 48 hours, please call to schedule at [phone]. Please report to Physical Therapy in the BASEMENT of [facility].     >>  Nothing to eat or drink except water for FOUR (4) HOURS prior to arrival time. >>  No caffeine for 24 hours prior to test. This includes decaffeinated beverages,  chocolate, tea and cola drinks.      >> Take regular medications.

## 2020-11-23 NOTE — PROGRESS NOTES
Today's Date: 11/23/2020  Patient's Name: [place]  Patient's age: 79 y. o., 1940    CC: follow up for P. AF    HPI:  [place]  is here for follow up. Had Eval at [facility] due to tachycardia, dizziness, weakness on 11/19/20. She was noted to be in AF. Admits to dizziness worse with standing. Admits to CP, left sided, sharp in nature. Not feeling well ever since back in AF. Past Medical History:   has a past medical history of Arthritis, Cervical spine degeneration, Chronic renal insufficiency, stage II (mild), Colon polyps, Constipation, Cystocele, Diverticulitis of colon, Dyspepsia, GERD (gastroesophageal reflux disease), GI bleed, Head ache, Hyperlipidemia, Hypertension, Multiple melanocytic nevi, Mumps, Over weight, Panic attacks, Paroxysmal atrial fibrillation ([address] Shingles, and Tachycardia. Past Surgical History:   has a past surgical history that includes back surgery (1985, 1986); Breast surgery (Right, 1994); Cardiac catheterization (11/28/2000); Colonoscopy (2006, 2009); colectomy (10/2006); Uterine fibroid embolization (10/2006); bladder repair (5/6/2009); Colonoscopy (2016); and Colonoscopy (03/2019). Home Medications:  Prior to Admission medications    Medication Sig Start Date End Date Taking?  Authorizing Provider   metoprolol tartrate (LOPRESSOR) 25 MG tablet TAKE 1/2 TABLET TWICE A DAY 8/28/20  Yes Estela Zheng MD   amLODIPine (NORVASC) 5 MG tablet take 1/2 tablet by mouth once daily 8/28/20  Yes Estela Zheng MD   lisinopril-hydroCHLOROthiazide (PRINZIDE;ZESTORETIC) 20-25 MG per tablet take 1 tablet by mouth once daily 8/28/20  Yes Estela Zheng MD   propafenone (RYTHMOL) 225 MG tablet TAKE 1 TABLET EVERY 8 HOURS 4/27/20  Yes Francisco Marie MD   simvastatin (ZOCOR) 20 MG tablet TAKE 1 TABLET EVERY EVENING 3/12/20  Yes Francisco Marie MD   acetaminophen (TYLENOL) 325 MG tablet Take 650 mg by mouth every 6 hours as needed for Pain   Yes Historical Provider, MD   furosemide (LASIX) 20 MG tablet Take 1 tablet by mouth daily as needed (Edema) 10/17/18  Yes Elis Zaldivar MD   Cholecalciferol (VITAMIN D3) 1000 units CAPS Take by mouth Doubles her dose 3 days a week. Yes Historical Provider, MD   Cyanocobalamin (B-12) 1000 MCG CAPS Take by mouth Indications: High Binding Capacity of Iron to Red Blood Cells    Yes Historical Provider, MD   clotrimazole-betamethasone (LOTRISONE) 1-0.05 % cream Apply a thin film to the affected area 2 times daily. 6/4/18  Yes FRANNIE Nash CNP   nystatin (MYCOSTATIN) 784345 UNIT/GM powder Apply 3 times daily. 6/4/18  Yes FRANNIE Nash CNP   polyethylene glycol (GLYCOLAX) powder Take 17 g by mouth daily   Yes Historical Provider, MD   aspirin 81 MG tablet Take 81 mg by mouth daily. Yes Historical Provider, MD       Allergies:  Codeine; Morphine; and Other    Social History:   reports that she has never smoked. She has never used smokeless tobacco. She reports that she does not drink alcohol or use drugs. Family History: family history includes Emphysema in her father; Heart Disease in her sister; High Blood Pressure in her mother; Osteoporosis in her sister. No h/o sudden cardiac death. No for premature CAD    REVIEW OF SYSTEMS:    · Constitutional: there has been no unanticipated weight loss. There's been No change in energy level, No change in activity level. · Eyes: No visual changes or diplopia. No scleral icterus. · ENT: No Headaches, hearing loss or vertigo. No mouth sores or sore throat. · Cardiovascular: see above  · Respiratory: see above  · Gastrointestinal: No abdominal pain, appetite loss, blood in stools. · Genitourinary: No dysuria, trouble voiding, or hematuria. · Musculoskeletal:  No gait disturbance, No weakness or joint complaints. · Integumentary: No rash or pruritis. · Neurological: No headache or diplopia. No tingling  · Psychiatric: No anxiety, or depression.   · Endocrine: No temperature intolerance. · Hematologic/Lymphatic: No abnormal bruising or bleeding, blood clots or swollen lymph nodes. · Allergic/Immunologic: No nasal congestion or hives. PHYSICAL EXAM:      Vitals:    11/23/20 1512   BP: (!) 106/56   Pulse: 102   Resp: 16   SpO2: 99%     Constitutional and General Appearance: alert, cooperative, no distress and appears stated age  HEENT: PERRL, no cervical lymphadenopathy. No masses palpable. Normal oral mucosa  Respiratory:  · Normal excursion and expansion without use of accessory muscles  · Resp Auscultation: Good respiratory effort. No for increased work of breathing. On auscultation: clear to auscultation bilaterally  Cardiovascular:  · Heart tones are crisp and normal. regular S1 and S2.  · Jugular venous pulsation Normal  · The carotid upstroke is normal in amplitude and contour without delay or bruit   Abdomen:   · soft  · Bowel sounds present  Extremities:  ·  No edema  Neurological:  · Alert and oriented. Cardiac Data:  EKG: Possible atrial fibrillation   -Nonspecific QRS widening.    -Inferior infarct -probably not recent. -ST depression   +   T-abnormality  -Possible  Anterior/lateral  Ischemia- seen on prior ECGs. Labs:   LABS  Lab Results   Component Value Date    CHOL 164 07/20/2020    TRIG 98 07/20/2020    HDL 70 07/20/2020    LDLCHOLESTEROL 74 07/20/2020    VLDL NOT REPORTED 07/20/2020    CHOLHDLRATIO 2.3 07/20/2020       Lab Results   Component Value Date     11/19/2020    K 4.5 11/19/2020     11/19/2020    CO2 26 11/19/2020    BUN 28 (H) 11/19/2020    CREATININE 1.63 (H) 11/19/2020    GLUCOSE 114 (H) 11/19/2020    CALCIUM 10.0 11/19/2020    PROT 7.0 07/20/2020    LABALBU 4.4 07/20/2020    BILITOT 0.63 07/20/2020    ALKPHOS 68 07/20/2020    AST 14 07/20/2020    ALT 10 07/20/2020    LABGLOM 30 (L) 11/19/2020    GFRAA 37 (L) 11/19/2020       Nuclear stress test 6/11/19  IMPRESSION:  1.  Very small area with mild ischemia laterally. 2. LVEF 70%.     Assessment / Acute Cardiac Problems:      -CP moderate risk, with history of abnormal ECG and abnormal stress test  -PAF- back in AF (last time she was in AF was 2009). -HTN- now with dizziness consistent with orthostatic hypotension  -HLP: on statin. LDL 73 on 6/14/19.  -Preserved LV systolic function.  -Cardiac cath 2001 with no significant CAD. Nuclear stress test 6/11/19 showed very small mild ischemia laterally. Patient previously declined cardiac cath. -GI bleed hospitalized at [facility] followed by colonoscopy by Dr. 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 -Diastolic dysfunction: no signs of volume overload. -CKD        Plan of Treatment:      - stop lisinopril/HCTZ  - stop norvasc  - stop propafenone  - start amio 200 bid  - continue lopressor  - will check Lexiscan stress test to rule out ischemia/further risk stratify   - if stress test abnormal and if remains in AF in 2 weeks will arrange for JOANNA/CV/LHC (for which she is now agreeable  - no AC due to h/o GI bleed- on ASA    RTC in 2 weeks. The patient is to continue heart healthy diet, weight loss and exercise as tolerated. Patient's medications and side effects were discussed. Medication refills were provided if needed. Follow up appointment timing was discussed. All questions and concerns were addressed to patient's satisfaction. The patient is to follow up in 2 weeks or sooner if necessary. Thank you for allowing me to participate in the care of this patient, please do not hesitate to call if you have any questions. Rigoberto Delaney DO, [address], [address] [facility]  [facility] [employer]  [phone]

## 2020-11-24 ENCOUNTER — TELEPHONE (OUTPATIENT)
Dept: CARDIOLOGY | Age: 80
End: 2020-11-24

## 2020-11-24 NOTE — TELEPHONE ENCOUNTER
Patient called stating that Dr Shahnaz Wilson put her on amiodarone yesterday but she has not taken it due to reading about the side effects of the medication and is scared to take it. Patient is wondering if there is something else she can take since she is hesitant to take the amiodarone due to possible side effects.      Last Appt:  11/23/2020  Next Appt:   12/14/2020  Med verified in Epic

## 2020-11-30 ENCOUNTER — HOSPITAL ENCOUNTER (OUTPATIENT)
Dept: NUCLEAR MEDICINE | Age: 80
Discharge: HOME | End: 2020-12-02
Payer: MEDICARE

## 2020-11-30 ENCOUNTER — HOSPITAL ENCOUNTER (OUTPATIENT)
Dept: NON INVASIVE DIAGNOSTICS | Age: 80
Discharge: HOME | End: 2020-11-30
Payer: MEDICARE

## 2020-11-30 PROCEDURE — 93017 CV STRESS TEST TRACING ONLY: CPT

## 2020-11-30 PROCEDURE — 78452 HT MUSCLE IMAGE SPECT MULT: CPT

## 2020-11-30 PROCEDURE — 3430000000 HC RX DIAGNOSTIC RADIOPHARMACEUTICAL: Performed by: INTERNAL MEDICINE

## 2020-11-30 PROCEDURE — A9500 TC99M SESTAMIBI: HCPCS | Performed by: INTERNAL MEDICINE

## 2020-11-30 PROCEDURE — 6360000002 HC RX W HCPCS: Performed by: INTERNAL MEDICINE

## 2020-11-30 RX ADMIN — REGADENOSON 0.4 MG: 0.08 INJECTION, SOLUTION INTRAVENOUS at 14:49

## 2020-11-30 RX ADMIN — TETRAKIS(2-METHOXYISOBUTYLISOCYANIDE)COPPER(I) TETRAFLUOROBORATE 10 MILLICURIE: 1 INJECTION, POWDER, LYOPHILIZED, FOR SOLUTION INTRAVENOUS at 13:30

## 2020-11-30 RX ADMIN — TETRAKIS(2-METHOXYISOBUTYLISOCYANIDE)COPPER(I) TETRAFLUOROBORATE 30 MILLICURIE: 1 INJECTION, POWDER, LYOPHILIZED, FOR SOLUTION INTRAVENOUS at 14:50

## 2020-11-30 NOTE — PROGRESS NOTES
Patient Name:  Yen Miller MRN:  [id number]   :  1940  Age:  79 y.o. Sex: female     Ordering Provider: Keyur Colin DO  Primary Care Provider:  Hermilo Day MD     Indications: Chest Pain     Medications:     Current Outpatient Medications:     amiodarone (CORDARONE) 200 MG tablet, Take 1 tablet by mouth 2 times daily, Disp: 180 tablet, Rfl: 1    metoprolol tartrate (LOPRESSOR) 25 MG tablet, TAKE 1/2 TABLET TWICE A DAY, Disp: 90 tablet, Rfl: 3    simvastatin (ZOCOR) 20 MG tablet, TAKE 1 TABLET EVERY EVENING, Disp: 90 tablet, Rfl: 3    acetaminophen (TYLENOL) 325 MG tablet, Take 650 mg by mouth every 6 hours as needed for Pain, Disp: , Rfl:     furosemide (LASIX) 20 MG tablet, Take 1 tablet by mouth daily as needed (Edema), Disp: 90 tablet, Rfl: 3    Cholecalciferol (VITAMIN D3) 1000 units CAPS, Take by mouth Doubles her dose 3 days a week., Disp: , Rfl:     Cyanocobalamin (B-12) 1000 MCG CAPS, Take by mouth Indications: High Binding Capacity of Iron to Red Blood Cells , Disp: , Rfl:     clotrimazole-betamethasone (LOTRISONE) 1-0.05 % cream, Apply a thin film to the affected area 2 times daily. , Disp: 45 g, Rfl: 1    nystatin (MYCOSTATIN) 064072 UNIT/GM powder, Apply 3 times daily. , Disp: 60 g, Rfl: 5    polyethylene glycol (GLYCOLAX) powder, Take 17 g by mouth daily, Disp: , Rfl:     aspirin 81 MG tablet, Take 81 mg by mouth daily. , Disp: , Rfl:       ----------------------------------------------------------------------------------------------------------                Lexiscan 0.4 mg injected over 10 seconds. IV Cardiolite injected 20 seconds post Lexiscan injection.      Heart Rate:  62  Resting Blood Pressure:  142/72   ----------------------------------------------------------------------------------------------------------     HR BP   1 min 85 149/71   2 min     3 min 81 134/67   4 min     5 min 79 133/67   6 min     7 min 77 133/65   8 min     9 min 76 134/69   10 min Symptoms:  Chest Pain:  No      Nausea:  No     Headache:  No    Shortness of Breath:  Yes     Other:  Abdominal cramping      Electronically signed by Rhett Mejia RN on 11/30/20 at 2:37 PM EST    ----------------------------------------------------------------------------------------------------------  Resting EKG: NSR, Non specific ST-T abnormality     Arrhythmias: none     EKG Changes:  No significant change from baseline to suggest ischemia     Maximum Changes: N/A     Leads with maximum changes:  N/A    Interpretation:    1. Negative ECG portion of stress test   2.  Nuclear scan report to follow       Supervising Physician:  Jose G De Oliveira MD

## 2020-12-02 NOTE — PROCEDURES
[address]                 [address] [place] [id number]                              CARDIAC STRESS TEST    PATIENT NAME: Jacqueline Bliss                 :        1940  MED REC NO:   [id number]                             ROOM:  ACCOUNT NO:   [de-identified]                           ADMIT DATE: 2020  PROVIDER:     Lawson Sultana    DATE OF STUDY:  2020    STRESS TEST    Ordering Provider:  Luis Alfredo Hightower MD    Primary Care Provider:  Najma Logan MD    Patient's Age: 79     Height: 5 feet 2 inches  Weight: 198 pounds    INDICATION:  Shortness of breath, chest pain. Lexiscan 0.4 mg injected over 10 seconds. IV Cardiolite injected 20 seconds post Lexiscan injection. Heart Rate: 62 Resting blood pressure:  142/72              HR   BP  1 min          85   149/71  2 min  3 min          81   134/67  4 min  5 min          79   133/67  6 min  7 min          77   133/65  8 min  9 min          76   134/69  10 min    Symptoms:  Chest Pain: No  Nausea: No  Headache:  No  Shortness of  breath: Yes  Other: Abdominal cramping. Resting EKG:  Normal sinus rhythm, nonspecific ST-T abnormality. Arrhythmias:  None. EKG changes:  No significant change from baseline to suggest ischemia. Maximum changes:  NA    Leads with maximum changes:  NA    INTERPRETATION:  1. Negative ECG portion of stress test.  2.  Nuclear scan report to follow. Nuclear Myocardial Perfusion Imaging (SPECT)    TESTING METHOD  STRESS:   Lexiscan  AGENT:    Cardiolite  REST:          Injection Date:  2020  Time:  1330  Amount:  11.9  mCi  STRESS:   Injection Date:  2020  Time:  1450  Amount:  30.3 mCi  IMAGE TIME:    Rest:  1404  Stress:  1527    EF:  70%  TID:  0.92  LHR:  0.36    FINDINGS:  Ischemia (Reversible Defect):           Small area of apical-lateral  ischemia.   Infarction (Irreversible Defect):       No  Ejection fraction Calculated: Normal  Segmental wall motion:                  Normal  Diastolic LV Compliance (Stiffness):    Normal    IMPRESSION:  1. Small area of apical-lateral ischemia. 2.  LVEF 70%.       Elsy Krause    D: 12/02/2020 13:57:52       T: 12/02/2020 13:59:00     [email or web address]  Job#: [id number]     Doc#: [id number]    CC:  Idalmis Gottlieb DO

## 2020-12-03 ENCOUNTER — TELEPHONE (OUTPATIENT)
Dept: CARDIOLOGY | Age: 80
End: 2020-12-03

## 2020-12-03 NOTE — TELEPHONE ENCOUNTER
Patient notified and is ok to wait to discuss at office visit.  Instructed to go to ER for chest pain

## 2020-12-03 NOTE — TELEPHONE ENCOUNTER
Small area of apical/lateral ischemia  Can discuss at upcoming visit in 1 week  If any CP should go to ER.

## 2020-12-07 ENCOUNTER — CLINICAL DOCUMENTATION (OUTPATIENT)
Dept: SPIRITUAL SERVICES | Age: 80
End: 2020-12-07

## 2020-12-07 NOTE — ACP (ADVANCE CARE PLANNING)
you want attempts to be made to restart your heart (answer \"yes\" for attempt to resuscitate) or would you prefer a natural death (answer \"no\" for do not attempt to resuscitate)? \" Unsure at this time       [x] Yes   [] No   Educated Patient / Wilcox Jessy regarding differences between Advance Directives and portable DNR orders.     Length of ACP Conversation in minutes:      Conversation Outcomes:  [x] ACP discussion completed  [] Existing advance directive reviewed with patient; no changes to patient's previously recorded wishes  [x] New Advance Directive completed  [] Portable Do Not Rescitate prepared for Provider review and signature  [] POLST/POST/MOLST/MOST prepared for Provider review and signature      Follow-up plan:    [] Schedule follow-up conversation to continue planning  [] Referred individual to Provider for additional questions/concerns   [] Advised patient/agent/surrogate to review completed ACP document and update if needed with changes in condition, patient preferences or care setting    [x] This note routed to one or more involved healthcare providers    Electronically signed by Alton Alberto on 12/7/2020 at 10:44 AM

## 2020-12-07 NOTE — PROGRESS NOTES
met with patient's  to complete advance directive. Patient chose to complete one as well. See ACP note.     Electronically signed by Shivam Pitts on 12/7/2020 at 10:42 AM

## 2020-12-14 ENCOUNTER — OFFICE VISIT (OUTPATIENT)
Dept: CARDIOLOGY | Age: 80
End: 2020-12-14
Payer: MEDICARE

## 2020-12-14 ENCOUNTER — HOSPITAL ENCOUNTER (OUTPATIENT)
Dept: LAB | Age: 80
Discharge: HOME | End: 2020-12-14
Payer: MEDICARE

## 2020-12-14 VITALS
DIASTOLIC BLOOD PRESSURE: 70 MMHG | WEIGHT: 200 LBS | HEIGHT: 62 IN | HEART RATE: 68 BPM | BODY MASS INDEX: 36.8 KG/M2 | SYSTOLIC BLOOD PRESSURE: 152 MMHG

## 2020-12-14 LAB — VITAMIN D 25-HYDROXY: 86.7 NG/ML (ref 30–100)

## 2020-12-14 PROCEDURE — 4040F PNEUMOC VAC/ADMIN/RCVD: CPT | Performed by: INTERNAL MEDICINE

## 2020-12-14 PROCEDURE — G8427 DOCREV CUR MEDS BY ELIG CLIN: HCPCS | Performed by: INTERNAL MEDICINE

## 2020-12-14 PROCEDURE — G8482 FLU IMMUNIZE ORDER/ADMIN: HCPCS | Performed by: INTERNAL MEDICINE

## 2020-12-14 PROCEDURE — G8399 PT W/DXA RESULTS DOCUMENT: HCPCS | Performed by: INTERNAL MEDICINE

## 2020-12-14 PROCEDURE — 82306 VITAMIN D 25 HYDROXY: CPT

## 2020-12-14 PROCEDURE — 1036F TOBACCO NON-USER: CPT | Performed by: INTERNAL MEDICINE

## 2020-12-14 PROCEDURE — 93010 ELECTROCARDIOGRAM REPORT: CPT | Performed by: INTERNAL MEDICINE

## 2020-12-14 PROCEDURE — 93005 ELECTROCARDIOGRAM TRACING: CPT | Performed by: INTERNAL MEDICINE

## 2020-12-14 PROCEDURE — 1123F ACP DISCUSS/DSCN MKR DOCD: CPT | Performed by: INTERNAL MEDICINE

## 2020-12-14 PROCEDURE — 99214 OFFICE O/P EST MOD 30 MIN: CPT | Performed by: INTERNAL MEDICINE

## 2020-12-14 PROCEDURE — 1090F PRES/ABSN URINE INCON ASSESS: CPT | Performed by: INTERNAL MEDICINE

## 2020-12-14 PROCEDURE — 36415 COLL VENOUS BLD VENIPUNCTURE: CPT

## 2020-12-14 PROCEDURE — G8417 CALC BMI ABV UP PARAM F/U: HCPCS | Performed by: INTERNAL MEDICINE

## 2020-12-14 NOTE — PROGRESS NOTES
Today's Date: 12/14/2020  Patient's Name: [place]  Patient's age: 79 y. o., 1940    CC: follow up for PAF    HPI:  [place]  is here for follow up after repeat stress test   She is back in normal sinus rhythm, feeling much better, denies any chest pain on today's visit. Dyspnea has improved. No lower extremity edema or orthopnea  No bleeding episodes    Past Medical History:   has a past medical history of Arthritis, Cervical spine degeneration, Chronic renal insufficiency, stage II (mild), Colon polyps, Constipation, Cystocele, Diverticulitis of colon, Dyspepsia, GERD (gastroesophageal reflux disease), GI bleed, Head ache, Hyperlipidemia, Hypertension, Multiple melanocytic nevi, Mumps, Over weight, Panic attacks, Paroxysmal atrial fibrillation ([address] Shingles, and Tachycardia. Past Surgical History:   has a past surgical history that includes back surgery (1985, 1986); Breast surgery (Right, 1994); Cardiac catheterization (11/28/2000); Colonoscopy (2006, 2009); colectomy (10/2006); Uterine fibroid embolization (10/2006); bladder repair (5/6/2009); Colonoscopy (2016); and Colonoscopy (03/2019). Home Medications:  Prior to Admission medications    Medication Sig Start Date End Date Taking? Authorizing Provider   amiodarone (CORDARONE) 200 MG tablet Take 1 tablet by mouth 2 times daily 11/23/20  Yes Raoul Gottlieb DO   metoprolol tartrate (LOPRESSOR) 25 MG tablet TAKE 1/2 TABLET TWICE A DAY 8/28/20  Yes Alisha Adams MD   simvastatin (ZOCOR) 20 MG tablet TAKE 1 TABLET EVERY EVENING 3/12/20  Yes Hermilo Day MD   acetaminophen (TYLENOL) 325 MG tablet Take 650 mg by mouth every 6 hours as needed for Pain   Yes Historical Provider, MD   Cholecalciferol (VITAMIN D3) 1000 units CAPS Take by mouth Doubles her dose 3 days a week.    Yes Historical Provider, MD Cyanocobalamin (B-12) 1000 MCG CAPS Take by mouth Indications: High Binding Capacity of Iron to Red Blood Cells    Yes Historical Provider, MD   polyethylene glycol (GLYCOLAX) powder Take 17 g by mouth daily   Yes Historical Provider, MD   aspirin 81 MG tablet Take 81 mg by mouth daily. Yes Historical Provider, MD   furosemide (LASIX) 20 MG tablet Take 1 tablet by mouth daily as needed (Edema)  Patient not taking: Reported on 12/14/2020 10/17/18   Gaurav Fitzgerald MD   clotrimazole-betamethasone (LOTRISONE) 1-0.05 % cream Apply a thin film to the affected area 2 times daily. Patient not taking: Reported on 12/14/2020 6/4/18   FRANNIE Bo CNP   nystatin (MYCOSTATIN) 907874 UNIT/GM powder Apply 3 times daily. Patient not taking: Reported on 12/14/2020 6/4/18   FRANNIE Bo CNP       Allergies:  Codeine, Morphine, and Other    Social History:   reports that she has never smoked. She has never used smokeless tobacco. She reports that she does not drink alcohol or use drugs. Family History: family history includes Emphysema in her father; Heart Disease in her sister; High Blood Pressure in her mother; Osteoporosis in her sister. No h/o sudden cardiac death. No for premature CAD    REVIEW OF SYSTEMS:    · Constitutional: there has been no unanticipated weight loss. There's been No change in energy level, No change in activity level. · Eyes: No visual changes or diplopia. No scleral icterus. · ENT: No Headaches, hearing loss or vertigo. No mouth sores or sore throat. · Cardiovascular: see above  · Respiratory: see above  · Gastrointestinal: No abdominal pain, appetite loss, blood in stools. · Genitourinary: No dysuria, trouble voiding, or hematuria. · Musculoskeletal:  No gait disturbance, No weakness or joint complaints. · Integumentary: No rash or pruritis. · Neurological: No headache or diplopia. No tingling  · Psychiatric: No anxiety, or depression. · Endocrine: No temperature intolerance. · Hematologic/Lymphatic: No abnormal bruising or bleeding, blood clots or swollen lymph nodes. · Allergic/Immunologic: No nasal congestion or hives. PHYSICAL EXAM:      Vitals:    12/14/20 1508   BP: (!) 160/82   Pulse: 68     Constitutional and General Appearance: alert, cooperative, no distress and appears stated age  HEENT: PERRL, no cervical lymphadenopathy. No masses palpable. Normal oral mucosa  Respiratory:  · Normal excursion and expansion without use of accessory muscles  · Resp Auscultation: Good respiratory effort. No for increased work of breathing. On auscultation: clear to auscultation bilaterally  Cardiovascular:  · Heart tones are crisp and normal. regular S1 and S2.  · Jugular venous pulsation Normal  · The carotid upstroke is normal in amplitude and contour without delay or bruit   Abdomen:   · soft  · Bowel sounds present  Extremities:  ·  No edema  Neurological:  · Alert and oriented. Cardiac Data:  EKG 12/14/2020, normal sinus rhythm, nonspecific ST-T abnormality, inferior posterior infarct    Labs:   LABS  Lab Results   Component Value Date    CHOL 164 07/20/2020    TRIG 98 07/20/2020    HDL 70 07/20/2020    LDLCHOLESTEROL 74 07/20/2020    VLDL NOT REPORTED 07/20/2020    CHOLHDLRATIO 2.3 07/20/2020       Lab Results   Component Value Date     11/19/2020    K 4.5 11/19/2020     11/19/2020    CO2 26 11/19/2020    BUN 28 (H) 11/19/2020    CREATININE 1.63 (H) 11/19/2020    GLUCOSE 114 (H) 11/19/2020    CALCIUM 10.0 11/19/2020    PROT 7.0 07/20/2020    LABALBU 4.4 07/20/2020    BILITOT 0.63 07/20/2020    ALKPHOS 68 07/20/2020    AST 14 07/20/2020    ALT 10 07/20/2020    LABGLOM 30 (L) 11/19/2020    GFRAA 37 (L) 11/19/2020       Nuclear stress test 6/11/19  1. Very small area with mild ischemia laterally. 2. LVEF 70%.       Nuclear stress test 11/30/2020  IMPRESSION:  1. Small area of apical-lateral ischemia. 2.  LVEF 70%. Assessment / Acute Cardiac Problems:      -PAF, back in normal sinus rhythm  -Chest pain with abnormal ecg and stress test as above  -HTN, fairly controlled at home  -HLP: on statin. LDL 73 on 6/14/19.  -Preserved LV systolic function.  -Cardiac cath 2001 with no significant CAD. Nuclear stress test 6/11/19 showed very small mild ischemia laterally. Patient previously declined cardiac cath. -GI bleed hospitalized at [facility] followed by colonoscopy by Dr. 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 -Diastolic dysfunction: no signs of volume overload. -CKD        Plan of Treatment:      Continue amiodarone 200 mg twice daily for another 2 weeks and change to once daily thereafter   Continue low-dose beta-blocker  Continue on aspirin for thromboembolic prophylaxis. Not a candidate for long-term anticoagulation as described above  Given abnormal stress test and occasional chest discomfort recommend repeat coronary angiography to guide medical therapy and rule out any underlying critical coronary artery stenosis. Patient will think about it and let us know after the holidays. It will be scheduled based on her wishes. Repeat evaluation in 3 months or earlier if needed    Thank you for allowing me to participate in the care of this patient, please do not hesitate to call if you have any questions. Donn Schilder, MD   [facility]  [facility]  [phone]

## 2021-03-04 ENCOUNTER — OFFICE VISIT (OUTPATIENT)
Dept: CARDIOLOGY | Age: 81
End: 2021-03-04
Payer: MEDICARE

## 2021-03-04 VITALS
DIASTOLIC BLOOD PRESSURE: 71 MMHG | HEIGHT: 62 IN | WEIGHT: 200 LBS | BODY MASS INDEX: 36.8 KG/M2 | SYSTOLIC BLOOD PRESSURE: 156 MMHG | HEART RATE: 66 BPM

## 2021-03-04 DIAGNOSIS — R94.39 ABNORMAL STRESS TEST: ICD-10-CM

## 2021-03-04 DIAGNOSIS — I48.0 PAROXYSMAL ATRIAL FIBRILLATION (HCC): Primary | ICD-10-CM

## 2021-03-04 PROCEDURE — 99214 OFFICE O/P EST MOD 30 MIN: CPT | Performed by: INTERNAL MEDICINE

## 2021-03-04 PROCEDURE — 93005 ELECTROCARDIOGRAM TRACING: CPT | Performed by: INTERNAL MEDICINE

## 2021-03-04 PROCEDURE — 4040F PNEUMOC VAC/ADMIN/RCVD: CPT | Performed by: INTERNAL MEDICINE

## 2021-03-04 PROCEDURE — 1123F ACP DISCUSS/DSCN MKR DOCD: CPT | Performed by: INTERNAL MEDICINE

## 2021-03-04 PROCEDURE — 1090F PRES/ABSN URINE INCON ASSESS: CPT | Performed by: INTERNAL MEDICINE

## 2021-03-04 PROCEDURE — 1036F TOBACCO NON-USER: CPT | Performed by: INTERNAL MEDICINE

## 2021-03-04 PROCEDURE — G8427 DOCREV CUR MEDS BY ELIG CLIN: HCPCS | Performed by: INTERNAL MEDICINE

## 2021-03-04 PROCEDURE — G8399 PT W/DXA RESULTS DOCUMENT: HCPCS | Performed by: INTERNAL MEDICINE

## 2021-03-04 PROCEDURE — 99213 OFFICE O/P EST LOW 20 MIN: CPT | Performed by: INTERNAL MEDICINE

## 2021-03-04 PROCEDURE — G8482 FLU IMMUNIZE ORDER/ADMIN: HCPCS | Performed by: INTERNAL MEDICINE

## 2021-03-04 PROCEDURE — 93010 ELECTROCARDIOGRAM REPORT: CPT | Performed by: INTERNAL MEDICINE

## 2021-03-04 PROCEDURE — G8417 CALC BMI ABV UP PARAM F/U: HCPCS | Performed by: INTERNAL MEDICINE

## 2021-03-04 RX ORDER — AMLODIPINE BESYLATE 5 MG/1
5 TABLET ORAL DAILY
Qty: 90 TABLET | Refills: 1 | Status: SHIPPED | OUTPATIENT
Start: 2021-03-04 | End: 2021-10-04

## 2021-03-04 NOTE — PROGRESS NOTES
Today's Date: 3/4/2021  Patient's Name: [place]  Patient's age: 79 y. o., 1940    CC: follow up for PAF and abnormal stress test     HPI:  [place]  is here for follow up visit  She remains in normal sinus rhythm, denies any chest pain on today's visit. Dyspnea has improved. No lower extremity edema or orthopnea  No bleeding episodes  Her blood pressure is running high at home    Past Medical History:   has a past medical history of Arthritis, Cervical spine degeneration, Chronic renal insufficiency, stage II (mild), Colon polyps, Constipation, Cystocele, Diverticulitis of colon, Dyspepsia, GERD (gastroesophageal reflux disease), GI bleed, Head ache, Hyperlipidemia, Hypertension, Multiple melanocytic nevi, Mumps, Over weight, Panic attacks, Paroxysmal atrial fibrillation ([address] Shingles, and Tachycardia. Past Surgical History:   has a past surgical history that includes back surgery (1985, 1986); Breast surgery (Right, 1994); Cardiac catheterization (11/28/2000); Colonoscopy (2006, 2009); colectomy (10/2006); Uterine fibroid embolization (10/2006); bladder repair (5/6/2009); Colonoscopy (2016); and Colonoscopy (03/2019). Home Medications:  Prior to Admission medications    Medication Sig Start Date End Date Taking?  Authorizing Provider   amLODIPine (NORVASC) 5 MG tablet Take 1 tablet by mouth daily 3/4/21  Yes Adina Payne MD   amiodarone (CORDARONE) 200 MG tablet Take 1 tablet by mouth 2 times daily  Patient taking differently: Take 200 mg by mouth daily  11/23/20  Yes Raoul Gottlieb DO   metoprolol tartrate (LOPRESSOR) 25 MG tablet TAKE 1/2 TABLET TWICE A DAY 8/28/20  Yes Crissy Carranza MD   simvastatin (ZOCOR) 20 MG tablet TAKE 1 TABLET EVERY EVENING 3/12/20  Yes Charlie Bhandari MD   acetaminophen (TYLENOL) 325 MG tablet Take 650 mg by mouth every 6 hours as needed for Pain   Yes Historical Provider, MD   Cholecalciferol (VITAMIN D3) 1000 units CAPS Take by mouth Doubles her dose 3 days a week. Yes Historical Provider, MD   Cyanocobalamin (B-12) 1000 MCG CAPS Take by mouth Indications: High Binding Capacity of Iron to Red Blood Cells    Yes Historical Provider, MD   polyethylene glycol (GLYCOLAX) powder Take 17 g by mouth daily   Yes Historical Provider, MD   aspirin 81 MG tablet Take 81 mg by mouth daily. Yes Historical Provider, MD       Allergies:  Codeine, Morphine, and Other    Social History:   reports that she has never smoked. She has never used smokeless tobacco. She reports that she does not drink alcohol or use drugs. Family History: family history includes Emphysema in her father; Heart Disease in her sister; High Blood Pressure in her mother; Osteoporosis in her sister. No h/o sudden cardiac death. No for premature CAD    REVIEW OF SYSTEMS:    · Constitutional: there has been no unanticipated weight loss. There's been No change in energy level, No change in activity level. · Eyes: No visual changes or diplopia. No scleral icterus. · ENT: No Headaches, hearing loss or vertigo. No mouth sores or sore throat. · Cardiovascular: see above  · Respiratory: see above  · Gastrointestinal: No abdominal pain, appetite loss, blood in stools. · Genitourinary: No dysuria, trouble voiding, or hematuria. · Musculoskeletal:  No gait disturbance, No weakness or joint complaints. · Integumentary: No rash or pruritis. · Neurological: No headache or diplopia. No tingling  · Psychiatric: No anxiety, or depression. · Endocrine: No temperature intolerance. · Hematologic/Lymphatic: No abnormal bruising or bleeding, blood clots or swollen lymph nodes. · Allergic/Immunologic: No nasal congestion or hives. PHYSICAL EXAM:      Vitals:    03/04/21 1011   BP: (!) 156/71   Pulse: 66     Constitutional and General Appearance: alert, cooperative, no distress and appears stated age  HEENT: PERRL, no cervical lymphadenopathy. No masses palpable.  Normal oral mucosa  Respiratory: · Normal excursion and expansion without use of accessory muscles  · Resp Auscultation: Good respiratory effort. No for increased work of breathing. On auscultation: clear to auscultation bilaterally  Cardiovascular:  · Heart tones are crisp and normal. regular S1 and S2.  · Jugular venous pulsation Normal  · The carotid upstroke is normal in amplitude and contour without delay or bruit   Abdomen:   · soft  · Bowel sounds present  Extremities:  ·  No edema  Neurological:  · Alert and oriented. Cardiac Data:  EKG 12/14/2020, normal sinus rhythm, nonspecific ST-T abnormality, inferior posterior infarct    Labs:   LABS  Lab Results   Component Value Date    CHOL 164 07/20/2020    TRIG 98 07/20/2020    HDL 70 07/20/2020    LDLCHOLESTEROL 74 07/20/2020    VLDL NOT REPORTED 07/20/2020    CHOLHDLRATIO 2.3 07/20/2020       Lab Results   Component Value Date     11/19/2020    K 4.5 11/19/2020     11/19/2020    CO2 26 11/19/2020    BUN 28 (H) 11/19/2020    CREATININE 1.63 (H) 11/19/2020    GLUCOSE 114 (H) 11/19/2020    CALCIUM 10.0 11/19/2020    PROT 7.0 07/20/2020    LABALBU 4.4 07/20/2020    BILITOT 0.63 07/20/2020    ALKPHOS 68 07/20/2020    AST 14 07/20/2020    ALT 10 07/20/2020    LABGLOM 30 (L) 11/19/2020    GFRAA 37 (L) 11/19/2020       Nuclear stress test 6/11/19  1. Very small area with mild ischemia laterally. 2. LVEF 70%.       Nuclear stress test 11/30/2020  IMPRESSION:  1. Small area of apical-lateral ischemia. 2.  LVEF 70%. Assessment / Acute Cardiac Problems:      -PAF, back in normal sinus rhythm  -Chest pain with abnormal ecg and stress test as above  -HTN, fairly controlled at home  -HLP: on statin. LDL 74 on 7/20/20  -Preserved LV systolic function.  -Cardiac cath 2001 with no significant CAD. Nuclear stress test 11/30/2020 showed small ischemia apical-laterally. Patient previously declined cardiac cath.    -GI bleed hospitalized at [facility] followed by colonoscopy by Dr. 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 -Diastolic dysfunction: no signs of volume overload. -CKD        Plan of Treatment:      Continue amiodarone 200 mg daily  Continue low-dose beta-blocker  Start amlodipine 5 mg daily for better blood pressure control  Continue on aspirin for thromboembolic prophylaxis. Not a candidate for long-term anticoagulation as described above  Patient is now agreeable to left heart cath, will schedule next week, will need pre and post procedure IV hydration    Repeat evaluation in 6 months or earlier if needed    Thank you for allowing me to participate in the care of this patient, please do not hesitate to call if you have any questions. Lenin Lawrence MD   [facility]  [email or web address]  [phone]

## 2021-03-08 ENCOUNTER — TELEPHONE (OUTPATIENT)
Dept: FAMILY MEDICINE CLINIC | Age: 81
End: 2021-03-08

## 2021-03-08 RX ORDER — SIMVASTATIN 20 MG
TABLET ORAL
Qty: 90 TABLET | Refills: 3 | Status: CANCELLED | OUTPATIENT
Start: 2021-03-08

## 2021-03-08 RX ORDER — SIMVASTATIN 20 MG
TABLET ORAL
Qty: 90 TABLET | Refills: 3 | Status: SHIPPED | OUTPATIENT
Start: 2021-03-08 | End: 2021-10-19 | Stop reason: SDUPTHER

## 2021-03-18 NOTE — PROGRESS NOTES
Pre procedure call made. Voice mail left Pt informed of when and where to arrive, NPO status, visitor and mask policy.

## 2021-03-19 ENCOUNTER — HOSPITAL ENCOUNTER (OUTPATIENT)
Dept: CARDIAC CATH/INVASIVE PROCEDURES | Age: 81
Discharge: HOME | End: 2021-03-19
Payer: MEDICARE

## 2021-03-19 VITALS
HEART RATE: 63 BPM | HEIGHT: 62 IN | DIASTOLIC BLOOD PRESSURE: 54 MMHG | OXYGEN SATURATION: 97 % | TEMPERATURE: 97.7 F | RESPIRATION RATE: 19 BRPM | WEIGHT: 200 LBS | BODY MASS INDEX: 36.8 KG/M2 | SYSTOLIC BLOOD PRESSURE: 112 MMHG

## 2021-03-19 LAB
ANION GAP SERPL CALCULATED.3IONS-SCNC: 7 MMOL/L (ref 9–17)
BUN BLDV-MCNC: 21 MG/DL (ref 8–23)
BUN/CREAT BLD: ABNORMAL (ref 9–20)
CALCIUM SERPL-MCNC: 10.2 MG/DL (ref 8.6–10.4)
CHLORIDE BLD-SCNC: 106 MMOL/L (ref 98–107)
CO2: 28 MMOL/L (ref 20–31)
CREAT SERPL-MCNC: 1.32 MG/DL (ref 0.5–0.9)
GFR AFRICAN AMERICAN: 47 ML/MIN
GFR NON-AFRICAN AMERICAN: 39 ML/MIN
GFR SERPL CREATININE-BSD FRML MDRD: ABNORMAL ML/MIN/{1.73_M2}
GFR SERPL CREATININE-BSD FRML MDRD: ABNORMAL ML/MIN/{1.73_M2}
GLUCOSE BLD-MCNC: 109 MG/DL (ref 70–99)
HCT VFR BLD CALC: 40.5 % (ref 36.3–47.1)
HEMOGLOBIN: 13.5 G/DL (ref 11.9–15.1)
PLATELET # BLD: 199 K/UL (ref 138–453)
POTASSIUM SERPL-SCNC: 5.3 MMOL/L (ref 3.7–5.3)
SODIUM BLD-SCNC: 141 MMOL/L (ref 135–144)

## 2021-03-19 PROCEDURE — 7100000000 HC PACU RECOVERY - FIRST 15 MIN

## 2021-03-19 PROCEDURE — 85049 AUTOMATED PLATELET COUNT: CPT

## 2021-03-19 PROCEDURE — 85014 HEMATOCRIT: CPT

## 2021-03-19 PROCEDURE — C1887 CATHETER, GUIDING: HCPCS

## 2021-03-19 PROCEDURE — 6360000002 HC RX W HCPCS

## 2021-03-19 PROCEDURE — 2709999900 HC NON-CHARGEABLE SUPPLY

## 2021-03-19 PROCEDURE — C1894 INTRO/SHEATH, NON-LASER: HCPCS

## 2021-03-19 PROCEDURE — 2500000003 HC RX 250 WO HCPCS

## 2021-03-19 PROCEDURE — 80048 BASIC METABOLIC PNL TOTAL CA: CPT

## 2021-03-19 PROCEDURE — 93458 L HRT ARTERY/VENTRICLE ANGIO: CPT | Performed by: INTERNAL MEDICINE

## 2021-03-19 PROCEDURE — C1769 GUIDE WIRE: HCPCS

## 2021-03-19 PROCEDURE — 6360000004 HC RX CONTRAST MEDICATION

## 2021-03-19 PROCEDURE — 7100000001 HC PACU RECOVERY - ADDTL 15 MIN

## 2021-03-19 PROCEDURE — 85018 HEMOGLOBIN: CPT

## 2021-03-19 PROCEDURE — 6370000000 HC RX 637 (ALT 250 FOR IP)

## 2021-03-19 RX ORDER — SODIUM CHLORIDE 9 MG/ML
INJECTION, SOLUTION INTRAVENOUS CONTINUOUS
Status: DISCONTINUED | OUTPATIENT
Start: 2021-03-19 | End: 2021-03-20 | Stop reason: HOSPADM

## 2021-03-19 RX ORDER — SODIUM CHLORIDE 0.9 % (FLUSH) 0.9 %
10 SYRINGE (ML) INJECTION EVERY 12 HOURS SCHEDULED
Status: DISCONTINUED | OUTPATIENT
Start: 2021-03-19 | End: 2021-03-20 | Stop reason: HOSPADM

## 2021-03-19 RX ORDER — SODIUM CHLORIDE 0.9 % (FLUSH) 0.9 %
10 SYRINGE (ML) INJECTION PRN
Status: DISCONTINUED | OUTPATIENT
Start: 2021-03-19 | End: 2021-03-20 | Stop reason: HOSPADM

## 2021-03-19 RX ORDER — AMIODARONE HYDROCHLORIDE 200 MG/1
200 TABLET ORAL DAILY
COMMUNITY
End: 2021-04-15 | Stop reason: SDUPTHER

## 2021-03-19 RX ORDER — ACETAMINOPHEN 325 MG/1
650 TABLET ORAL EVERY 4 HOURS PRN
Status: DISCONTINUED | OUTPATIENT
Start: 2021-03-19 | End: 2021-03-20 | Stop reason: HOSPADM

## 2021-03-19 RX ADMIN — SODIUM CHLORIDE: 9 INJECTION, SOLUTION INTRAVENOUS at 08:50

## 2021-03-19 RX ADMIN — ACETAMINOPHEN 650 MG: 325 TABLET ORAL at 16:31

## 2021-03-19 NOTE — PROGRESS NOTES
Patient admitted, consent signed, all questions answered. Pt ready for procedure. Bed in low position, call light to reach with side rails up 2 of 2. Bilateral groin hair clipped. Family at bedside with patient.

## 2021-03-19 NOTE — PROGRESS NOTES
All discharge instructions reviewed, questions answered, paper signed and given copy. Patient discharged per wheelchair with  and daughter and belongings.

## 2021-03-19 NOTE — OP NOTE
[facility]  Cardiac catheterization note        Date:   3/19/2021  Patient name: Otho Frankel  Date of admission:  3/19/2021  7:58 AM  MRN:   [id number]  YOB: 1940    Operators:  Ryan Bateman MD (Attending Physician)       Pre Procedure Conscious Sedation Data:    ASA Class:    [] I [x] II [] III [] IV    Mallampati Class:  [] I [x] II [] III [] IV      Indication:  Angina  Abnormal Stress test    Procedure:   1. Right Radial artery access   2. Left heart catheterization   3. Selective left and right coronary angiography   4. Left ventriculography       Access:  [] Femoral  [x] Radial  artery       [x] Right  [] Left    Procedure: After informed consent was obtained with explanation of the risks and benefits, patient was brought to the cath lab. The access area was prepped and draped in sterile fashion. 1% lidocaine was used for local block. The artery was cannulated with 6  Fr sheath with brisk arterial blood return. The side port was frequently flushed and aspirated with normal saline. Findings:  LMCA: Normal 0% stenosis.     LAD: Mild irregularities 10-20%.     LCx: Large, dominant, patent and normal.  OM is large and normal  LPDA/LPL are small with mild disease     RCA: Small. Nondominant and could not be engaged, possibly occluded.      Coronary Tree      Dominance: Left      The LV gram was not performed. Estimated blood loss: 5 ml    Conclusions:   1. Mild non-obstructive CAD   2. Normal LVEDP. LV gram not performed due to renal insufficiency. Recommendation:  Routine Post Diagnostic Cath orders. Medical therapy as needed. Risk factor modification.         Electronically signed by Gilbert Cole MD on 3/19/2021 at 2:47 PM  Cardiovascular fellow  [facility]      Attending Physician     Esdras Luna MD, [facility]

## 2021-03-19 NOTE — H&P
[facility]  Pre- Procedure History and Physical/Update          Patient Name:  Laquetta Apgar  MRN:    [id number]  YOB: 1940  Date of evaluation:  3/19/2021       Please refer to the consult note / H&P completed by Dr. Supriya White  on 3/4/2021 in the medical record and note that:       [x] I have examined the patient and reviewed the H&P/Consult and there are no changes to be made to the assessment or plan. [] I have examined the patient and reviewed the H&P/Consult and have noted the following changes:        Past Medical History:   Diagnosis Date    Arthritis     degenerative    Cervical spine degeneration     Chronic renal insufficiency, stage II (mild)     Colon polyps     history of     Constipation     Cystocele     Diverticulitis of colon     history of     Dyspepsia     history of     GERD (gastroesophageal reflux disease)     GI bleed 2016    Head ache     Hyperlipidemia     Hypertension     Multiple melanocytic nevi     Mumps     Over weight     Panic attacks     history of     Paroxysmal atrial fibrillation (HCC)     Positive cardiac stress test 2021    Shingles     history of     Tachycardia     episodic       Past Surgical History:   Procedure Laterality Date    BACK SURGERY  ,     BLADDER REPAIR  2009    Anterior repair with Prolift mesh.  BREAST SURGERY Right     biopsy, benign    CARDIAC CATHETERIZATION  2000    CARDIAC CATHETERIZATION  2021    COLECTOMY  10/2006    COLONOSCOPY  , 2009    x2    COLONOSCOPY      Dr. Aurelia Lieberman. Repeat 2019    COLONOSCOPY  2019    Cecal Polyp  Done at [address] [place] [address] [address] EMBOLIZATION  10/2006        reports that she has never smoked. She has never used smokeless tobacco. She reports that she does not drink alcohol or use drugs. Prior to Admission medications    Medication Sig Start Date End Date Taking?  Authorizing Provider   amiodarone (CORDARONE) 200 MG tablet Take 200 mg by mouth daily   Yes Historical Provider, MD   simvastatin (ZOCOR) 20 MG tablet TAKE 1 TABLET EVERY EVENING 3/8/21  Yes Joseph Evans MD   amLODIPine (NORVASC) 5 MG tablet Take 1 tablet by mouth daily 3/4/21  Yes Ginger Serrano MD   metoprolol tartrate (LOPRESSOR) 25 MG tablet TAKE 1/2 TABLET TWICE A DAY 8/28/20  Yes Bhargavi Sahni MD   acetaminophen (TYLENOL) 325 MG tablet Take 650 mg by mouth every 6 hours as needed for Pain   Yes Historical Provider, MD   Cholecalciferol (VITAMIN D3) 1000 units CAPS Take by mouth Doubles her dose 3 days a week. On M-W-F   Yes Historical Provider, MD   Cyanocobalamin (B-12) 1000 MCG CAPS Take 1 tablet by mouth daily Indications: High Binding Capacity of Iron to Red Blood Cells    Yes Historical Provider, MD   polyethylene glycol (GLYCOLAX) powder Take 17 g by mouth daily   Yes Historical Provider, MD   aspirin 81 MG tablet Take 81 mg by mouth daily. Yes Historical Provider, MD       Allergies   Allergen Reactions    Codeine Other (See Comments)     nightmares    Morphine Other (See Comments)     Nightmares      Other      Bee stings - swelling         REVIEW OF SYSTEMS:     A detailed review of system was performed as already noted and is otherwise as above. PHYSICAL EXAM:     Vitals:    03/19/21 0841   BP: (!) 142/65   Pulse: 63   Resp: 16   Temp: 97.7 °F (36.5 °C)   SpO2: 98%        Constitutional and General Appearance: alert, cooperative, no distress and appears stated age  HEENT: PERRL, no cervical lymphadenopathy. No masses palpable. Normal oral mucosa  Respiratory:  · Normal excursion and expansion without use of accessory muscles  · Resp Auscultation: Good respiratory effort. No for increased work of breathing.  On auscultation: clear to auscultation bilaterally  Cardiovascular:  · The apical impulse is not displaced  · Heart tones are crisp and normal. regular S1 and S2.  · Jugular venous pulsation Normal  · The carotid upstroke is normal in amplitude and contour without delay or bruit  · Peripheral pulses are symmetrical and full  Abdomen:  · No masses or tenderness  · Bowel sounds present  Extremities:  ·  No Cyanosis or Clubbing  ·  Lower extremity edema: No  · Skin: Warm and dry  Neurological:  · Alert and oriented. · Moves all extremities well  · No abnormalities of mood, affect, memory, mentation, or behavior are noted      Active Problems:    * No active hospital problems. *  Resolved Problems:    * No resolved hospital problems. *  Nuclear stress test 11/30/2020  IMPRESSION:  1.  Small area of apical-lateral ischemia. 2.  LVEF 70%. Assessment:  1. Chest pain   2. Abnormal Stress test - small area of apical lateral ischemia   3. P Afib  4. HTN  5. Hyperlipidemia  6. GI bleed hospitalized at [facility] followed by colonoscopy by Dr. 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 CKD  8. Diastolic dysfunction       Plan:  1. Presented early for hydration due to CKD  2. Proceed with planned coronary angiography +/- PCI. 3. Further orders to follow. Risk, benefits and alternatives of cardiac catheterization +/- PCI  were discussed in detail. Risk of bleeding, requiring blood transfusion, vascular complication requiring surgery, renal insufficieny with need of dialysis, CVA, MI, death and anesthesia complications including intubation were discussed. Patient agrees to proceed and verbalizes understanding.       Rosa Isela Vega MD  Fellow, [facility]

## 2021-03-19 NOTE — PROGRESS NOTES
Patient returned to room. Post cath recovery initiated. Patient awake and alert, family at bedside, call light with in reach. Side rails up times 2. Right wrist with vas band intact. No hematoma or drainage noted. Pulses palpable. Hand and fingers are warm with good capillary refill. Patient denies any complaints of pain to hand or arm. Patient closely monitored.

## 2021-04-15 ENCOUNTER — OFFICE VISIT (OUTPATIENT)
Dept: CARDIOLOGY | Age: 81
End: 2021-04-15
Payer: MEDICARE

## 2021-04-15 VITALS
HEART RATE: 64 BPM | HEIGHT: 62 IN | SYSTOLIC BLOOD PRESSURE: 142 MMHG | BODY MASS INDEX: 36.8 KG/M2 | WEIGHT: 200 LBS | DIASTOLIC BLOOD PRESSURE: 64 MMHG

## 2021-04-15 DIAGNOSIS — I48.0 PAROXYSMAL ATRIAL FIBRILLATION (HCC): ICD-10-CM

## 2021-04-15 PROCEDURE — G8417 CALC BMI ABV UP PARAM F/U: HCPCS | Performed by: INTERNAL MEDICINE

## 2021-04-15 PROCEDURE — G8427 DOCREV CUR MEDS BY ELIG CLIN: HCPCS | Performed by: INTERNAL MEDICINE

## 2021-04-15 PROCEDURE — 99214 OFFICE O/P EST MOD 30 MIN: CPT | Performed by: INTERNAL MEDICINE

## 2021-04-15 PROCEDURE — 1090F PRES/ABSN URINE INCON ASSESS: CPT | Performed by: INTERNAL MEDICINE

## 2021-04-15 PROCEDURE — 1036F TOBACCO NON-USER: CPT | Performed by: INTERNAL MEDICINE

## 2021-04-15 PROCEDURE — 99213 OFFICE O/P EST LOW 20 MIN: CPT | Performed by: INTERNAL MEDICINE

## 2021-04-15 PROCEDURE — 4040F PNEUMOC VAC/ADMIN/RCVD: CPT | Performed by: INTERNAL MEDICINE

## 2021-04-15 PROCEDURE — G8399 PT W/DXA RESULTS DOCUMENT: HCPCS | Performed by: INTERNAL MEDICINE

## 2021-04-15 PROCEDURE — 1123F ACP DISCUSS/DSCN MKR DOCD: CPT | Performed by: INTERNAL MEDICINE

## 2021-04-15 RX ORDER — AMIODARONE HYDROCHLORIDE 100 MG/1
100 TABLET ORAL DAILY
Qty: 30 TABLET | Refills: 3 | Status: SHIPPED | OUTPATIENT
Start: 2021-04-15 | End: 2021-05-27

## 2021-04-15 NOTE — PROGRESS NOTES
Today's Date: 4/15/2021  Patient's Name: [place]  Patient's age: 79 y. o., 1940    CC: follow up for PAF and abnormal stress test     HPI:  [place]  is here for post cardiac cath follow up   She remains in normal sinus rhythm, denies any chest pain ort dyspnea. No lower extremity edema or orthopnea  No bleeding episodes      Past Medical History:   has a past medical history of Arthritis, Cervical spine degeneration, Chronic renal insufficiency, stage II (mild), Colon polyps, Constipation, Cystocele, Diverticulitis of colon, Dyspepsia, GERD (gastroesophageal reflux disease), GI bleed, Head ache, Hyperlipidemia, Hypertension, Multiple melanocytic nevi, Mumps, Over weight, Panic attacks, Paroxysmal atrial fibrillation ([address] Positive cardiac stress test, Shingles, and Tachycardia. Past Surgical History:   has a past surgical history that includes back surgery (1985, 1986); Breast surgery (Right, 1994); Colonoscopy (2006, 2009); colectomy (10/2006); Uterine fibroid embolization (10/2006); bladder repair (5/6/2009); Colonoscopy (2016); Colonoscopy (03/2019); Cardiac catheterization (11/28/2000); and Cardiac catheterization (03/19/2021). Home Medications:  Prior to Admission medications    Medication Sig Start Date End Date Taking? Authorizing Provider   amiodarone (CORDARONE) 200 MG tablet Take 200 mg by mouth daily   Yes Historical Provider, MD   simvastatin (ZOCOR) 20 MG tablet TAKE 1 TABLET EVERY EVENING 3/8/21  Yes Florrie Goltz, MD   amLODIPine (NORVASC) 5 MG tablet Take 1 tablet by mouth daily 3/4/21  Yes Santosh Sampson MD   metoprolol tartrate (LOPRESSOR) 25 MG tablet TAKE 1/2 TABLET TWICE A DAY 8/28/20  Yes Lamar Galicia MD   acetaminophen (TYLENOL) 325 MG tablet Take 650 mg by mouth every 6 hours as needed for Pain   Yes Historical Provider, MD   Cholecalciferol (VITAMIN D3) 1000 units CAPS Take by mouth Doubles her dose 3 days a week.  On M-W-F   Yes Historical Provider, MD   Cyanocobalamin (B-12) 1000 MCG CAPS Take 1 tablet by mouth daily Indications: High Binding Capacity of Iron to Red Blood Cells    Yes Historical Provider, MD   polyethylene glycol (GLYCOLAX) powder Take 17 g by mouth daily   Yes Historical Provider, MD   aspirin 81 MG tablet Take 81 mg by mouth daily. Yes Historical Provider, MD       Allergies:  Codeine, Morphine, and Other    Social History:   reports that she has never smoked. She has never used smokeless tobacco. She reports that she does not drink alcohol or use drugs. Family History: family history includes Emphysema in her father; Heart Disease in her sister; High Blood Pressure in her mother; Osteoporosis in her sister. No h/o sudden cardiac death. No for premature CAD    REVIEW OF SYSTEMS:    · Constitutional: there has been no unanticipated weight loss. There's been No change in energy level, No change in activity level. · Eyes: No visual changes or diplopia. No scleral icterus. · ENT: No Headaches, hearing loss or vertigo. No mouth sores or sore throat. · Cardiovascular: see above  · Respiratory: see above  · Gastrointestinal: No abdominal pain, appetite loss, blood in stools. · Genitourinary: No dysuria, trouble voiding, or hematuria. · Musculoskeletal:  No gait disturbance, No weakness or joint complaints. · Integumentary: No rash or pruritis. · Neurological: No headache or diplopia. No tingling  · Psychiatric: No anxiety, or depression. · Endocrine: No temperature intolerance. · Hematologic/Lymphatic: No abnormal bruising or bleeding, blood clots or swollen lymph nodes. · Allergic/Immunologic: No nasal congestion or hives. PHYSICAL EXAM:      Vitals:    04/15/21 1002   BP: (!) 142/64   Pulse: 64     Constitutional and General Appearance: alert, cooperative, no distress and appears stated age  HEENT: PERRL, no cervical lymphadenopathy. No masses palpable.  Normal oral mucosa  Respiratory:  · Normal excursion and expansion without use of accessory muscles  · Resp Auscultation: Good respiratory effort. No for increased work of breathing. On auscultation: clear to auscultation bilaterally  Cardiovascular:  · Heart tones are crisp and normal. regular S1 and S2.  · Jugular venous pulsation Normal  · The carotid upstroke is normal in amplitude and contour without delay or bruit   Abdomen:   · soft  · Bowel sounds present  Extremities:  ·  No edema  Neurological:  · Alert and oriented. Cardiac Data:  EKG 12/14/2020, normal sinus rhythm, nonspecific ST-T abnormality, inferior posterior infarct    Labs:   LABS  Lab Results   Component Value Date    CHOL 164 07/20/2020    TRIG 98 07/20/2020    HDL 70 07/20/2020    LDLCHOLESTEROL 74 07/20/2020    VLDL NOT REPORTED 07/20/2020    CHOLHDLRATIO 2.3 07/20/2020       Lab Results   Component Value Date     03/19/2021    K 5.3 03/19/2021     03/19/2021    CO2 28 03/19/2021    BUN 21 03/19/2021    CREATININE 1.32 (H) 03/19/2021    GLUCOSE 109 (H) 03/19/2021    CALCIUM 10.2 03/19/2021    PROT 7.0 07/20/2020    LABALBU 4.4 07/20/2020    BILITOT 0.63 07/20/2020    ALKPHOS 68 07/20/2020    AST 14 07/20/2020    ALT 10 07/20/2020    LABGLOM 39 (L) 03/19/2021    GFRAA 47 (L) 03/19/2021       Nuclear stress test 6/11/19  1. Very small area with mild ischemia laterally. 2. LVEF 70%.       Nuclear stress test 11/30/2020  IMPRESSION:  1. Small area of apical-lateral ischemia. 2.  LVEF 70%. Cardiac cath 3/19/21  LMCA: Normal 0% stenosis. LAD: Mild irregularities 10-20%. LCx: Large, dominant, patent and normal.  OM is large and normal  LPDA/LPL are small with mild disease  RCA Small. Nondominant and could not be engaged, possibly occluded.       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  -Hx of GI bleed hospitalized at [facility] followed by colonoscopy by Dr. 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 -Diastolic dysfunction: no signs of volume overload. -CKD        Plan of Treatment:      Decrease amiodarone to 1200 mg daily  Continue low-dose beta-blocker  Start amlodipine 5 mg daily for better blood pressure control  Continue on aspirin for thromboembolic prophylaxis. Not a candidate for long-term anticoagulation as described above      Repeat evaluation in 6 months or earlier if needed    Thank you for allowing me to participate in the care of this patient, please do not hesitate to call if you have any questions. Yogesh Kirk MD   [facility]  [email or web address]  [phone]

## 2021-05-26 ENCOUNTER — OFFICE VISIT (OUTPATIENT)
Dept: FAMILY MEDICINE CLINIC | Age: 81
End: 2021-05-26
Payer: MEDICARE

## 2021-05-26 VITALS
SYSTOLIC BLOOD PRESSURE: 132 MMHG | BODY MASS INDEX: 36.58 KG/M2 | WEIGHT: 200 LBS | HEART RATE: 116 BPM | TEMPERATURE: 97.7 F | OXYGEN SATURATION: 98 % | DIASTOLIC BLOOD PRESSURE: 84 MMHG | RESPIRATION RATE: 24 BRPM

## 2021-05-26 DIAGNOSIS — I49.9 IRREGULAR HEART RATE: Primary | ICD-10-CM

## 2021-05-26 DIAGNOSIS — R06.02 SOB (SHORTNESS OF BREATH): ICD-10-CM

## 2021-05-26 DIAGNOSIS — R05.9 COUGH: ICD-10-CM

## 2021-05-26 DIAGNOSIS — R60.0 EDEMA OF BOTH LOWER LEGS: ICD-10-CM

## 2021-05-26 DIAGNOSIS — I48.0 PAROXYSMAL ATRIAL FIBRILLATION (HCC): ICD-10-CM

## 2021-05-26 PROCEDURE — 93005 ELECTROCARDIOGRAM TRACING: CPT | Performed by: NURSE PRACTITIONER

## 2021-05-26 PROCEDURE — 1123F ACP DISCUSS/DSCN MKR DOCD: CPT | Performed by: NURSE PRACTITIONER

## 2021-05-26 PROCEDURE — 99213 OFFICE O/P EST LOW 20 MIN: CPT | Performed by: NURSE PRACTITIONER

## 2021-05-26 PROCEDURE — G8399 PT W/DXA RESULTS DOCUMENT: HCPCS | Performed by: NURSE PRACTITIONER

## 2021-05-26 PROCEDURE — G8417 CALC BMI ABV UP PARAM F/U: HCPCS | Performed by: NURSE PRACTITIONER

## 2021-05-26 PROCEDURE — 93010 ELECTROCARDIOGRAM REPORT: CPT | Performed by: NURSE PRACTITIONER

## 2021-05-26 PROCEDURE — G8427 DOCREV CUR MEDS BY ELIG CLIN: HCPCS | Performed by: NURSE PRACTITIONER

## 2021-05-26 PROCEDURE — 1090F PRES/ABSN URINE INCON ASSESS: CPT | Performed by: NURSE PRACTITIONER

## 2021-05-26 PROCEDURE — 1036F TOBACCO NON-USER: CPT | Performed by: NURSE PRACTITIONER

## 2021-05-26 PROCEDURE — 4040F PNEUMOC VAC/ADMIN/RCVD: CPT | Performed by: NURSE PRACTITIONER

## 2021-05-26 SDOH — ECONOMIC STABILITY: FOOD INSECURITY: WITHIN THE PAST 12 MONTHS, YOU WORRIED THAT YOUR FOOD WOULD RUN OUT BEFORE YOU GOT MONEY TO BUY MORE.: NEVER TRUE

## 2021-05-26 SDOH — ECONOMIC STABILITY: FOOD INSECURITY: WITHIN THE PAST 12 MONTHS, THE FOOD YOU BOUGHT JUST DIDN'T LAST AND YOU DIDN'T HAVE MONEY TO GET MORE.: NEVER TRUE

## 2021-05-26 ASSESSMENT — ENCOUNTER SYMPTOMS
SORE THROAT: 0
COUGH: 1
WHEEZING: 0
RHINORRHEA: 0
SHORTNESS OF BREATH: 1
CHEST TIGHTNESS: 1
HEMOPTYSIS: 0

## 2021-05-26 ASSESSMENT — PATIENT HEALTH QUESTIONNAIRE - PHQ9
SUM OF ALL RESPONSES TO PHQ9 QUESTIONS 1 & 2: 0
SUM OF ALL RESPONSES TO PHQ QUESTIONS 1-9: 0
1. LITTLE INTEREST OR PLEASURE IN DOING THINGS: 0
SUM OF ALL RESPONSES TO PHQ QUESTIONS 1-9: 0

## 2021-05-26 NOTE — LETTER
[facility]  [address]  Phone: [phone]  Fax: [phone]    FRANNIE Encarnacion NP        05/26/2021    Patient: Wilmar Talley   YOB: 1940   Date of Visit: 05/26/2021       To Whom it May Concern:    [place] was seen in my clinic on 05/26/2021 . They may return to work/school after a negative covid test result (results expected within 5 days). If their test is positive they will need to quarantine for a total of ten days and be fever free at that time. If you have any questions or concerns, please don't hesitate to call.     Sincerely,         FRANNIE Encarnacion NP

## 2021-05-26 NOTE — PROGRESS NOTES
history of     Paroxysmal atrial fibrillation (HCC)     Positive cardiac stress test 03/2021    Shingles     history of     Tachycardia     episodic      Past Surgical History:   Procedure Laterality Date    BACK SURGERY  1985, 1986    BLADDER REPAIR  5/6/2009    Anterior repair with Prolift mesh.  BREAST SURGERY Right 1994    biopsy, benign    CARDIAC CATHETERIZATION  11/28/2000    CARDIAC CATHETERIZATION  03/19/2021    COLECTOMY  10/2006    COLONOSCOPY  2006, 2009    x2    COLONOSCOPY  2016    Dr. Misty Chacko. Repeat 2019    COLONOSCOPY  03/2019    [facility]ecal [facility]yp  [facility] at [facility]    UTERINE FIBROID EMBOLIZATION  10/2006     Family History   Problem Relation Age of Onset    High Blood Pressure Mother     Emphysema Father     Osteoporosis Sister     Heart Disease Sister         CHF     Social History     Tobacco Use    Smoking status: Never Smoker    Smokeless tobacco: Never Used    Tobacco comment: cwaltmire rrt 7/15/17   Substance Use Topics    Alcohol use: No      Current Outpatient Medications   Medication Sig Dispense Refill    amiodarone (PACERONE) 100 MG tablet Take 1 tablet by mouth daily 30 tablet 3    simvastatin (ZOCOR) 20 MG tablet TAKE 1 TABLET EVERY EVENING 90 tablet 3    amLODIPine (NORVASC) 5 MG tablet Take 1 tablet by mouth daily 90 tablet 1    metoprolol tartrate (LOPRESSOR) 25 MG tablet TAKE 1/2 TABLET TWICE A DAY 90 tablet 3    acetaminophen (TYLENOL) 325 MG tablet Take 650 mg by mouth every 6 hours as needed for Pain      Cholecalciferol (VITAMIN D3) 1000 units CAPS Take by mouth Doubles her dose 3 days a week. On M-W-F      Cyanocobalamin (B-12) 1000 MCG CAPS Take 1 tablet by mouth daily Indications: High Binding Capacity of Iron to Red Blood Cells       polyethylene glycol (GLYCOLAX) powder Take 17 g by mouth daily      aspirin 81 MG tablet Take 81 mg by mouth daily. No current facility-administered medications for this visit.      Allergies Allergen Reactions    Codeine Other (See Comments)     nightmares    Morphine Other (See Comments)     Nightmares      Other      Bee stings - swelling       No exam data present    Subjective:      Review of Systems   Constitutional: Negative for chills and fever. HENT: Negative for ear pain, rhinorrhea and sore throat. Respiratory: Positive for cough, chest tightness and shortness of breath (harder to breath than normal). Negative for hemoptysis and wheezing. Cardiovascular: Positive for palpitations and leg swelling (new). Negative for chest pain. Neurological: Negative for headaches. Objective:     /84 (Site: Right Upper Arm, Position: Sitting, Cuff Size: Large Adult)   Pulse 116   Temp 97.7 °F (36.5 °C)   Resp 24   Wt 200 lb (90.7 kg)   LMP  (LMP Unknown)   SpO2 98%   BMI 36.58 kg/m²     Physical Exam  Vitals and nursing note reviewed. Constitutional:       General: She is not in acute distress. Appearance: Normal appearance. She is well-developed. She is not ill-appearing, toxic-appearing or diaphoretic. HENT:      Head: Normocephalic. Right Ear: Tympanic membrane, ear canal and external ear normal.      Left Ear: Tympanic membrane, ear canal and external ear normal.      Nose: Nose normal.      Mouth/Throat:      Mouth: Mucous membranes are moist.      Pharynx: Oropharynx is clear. Eyes:      General: No scleral icterus. Right eye: No discharge. Left eye: No discharge. Conjunctiva/sclera: Conjunctivae normal.   Cardiovascular:      Rate and Rhythm: Rhythm irregular. Heart sounds: Normal heart sounds. Comments: Borderline tachy. Pulmonary:      Effort: Tachypnea present. Breath sounds: Examination of the right-lower field reveals decreased breath sounds. Examination of the left-lower field reveals decreased breath sounds. Decreased breath sounds present. Musculoskeletal:         General: Normal range of motion.       Cervical back: Normal range of motion and neck supple. Right lower le+ Edema present. Left lower le+ Edema present. Skin:     General: Skin is warm and dry. Capillary Refill: Capillary refill takes less than 2 seconds. Neurological:      Mental Status: She is alert and oriented to person, place, and time. Psychiatric:         Mood and Affect: Mood normal.         Speech: Speech normal.         Behavior: Behavior normal.         Thought Content: Thought content normal.         Judgment: Judgment normal.         Assessment:      Diagnosis Orders   1. Irregular heart rate  EKG 12 Lead   2. SOB (shortness of breath)  EKG 12 Lead   3. Cough     4. Paroxysmal atrial fibrillation (HCC)     5. Edema of both lower legs       No results found for this visit on 21. Differentials:  CHF, MI, Pneumonia, Pulmonary embolism      Plan:       Ekg shows Atrial fib. ST changes -lead I, V2, V3, V4, V5. Squad called for transport to Emergency Room for further evaluation. Squad arrived for transport, and patient in stable condition. Patient/Caregiver instructed on use, benefit, and side effects of prescribed medications. All patient/parent/caregiver questions answered. Patient/parent/caregiver voiced understanding. Reviewed health maintenance. Instructed to continue current medications, diet and exercise. Patient agreed with treatment plan. Follow up as directed.            Electronically signed by FRANNIE Macdonald NP on2021

## 2021-05-27 ENCOUNTER — TELEPHONE (OUTPATIENT)
Dept: CARDIOLOGY | Age: 81
End: 2021-05-27

## 2021-05-27 ENCOUNTER — OFFICE VISIT (OUTPATIENT)
Dept: CARDIOLOGY | Age: 81
End: 2021-05-27
Payer: MEDICARE

## 2021-05-27 VITALS
HEART RATE: 98 BPM | WEIGHT: 200.4 LBS | DIASTOLIC BLOOD PRESSURE: 70 MMHG | BODY MASS INDEX: 36.88 KG/M2 | SYSTOLIC BLOOD PRESSURE: 122 MMHG | HEIGHT: 62 IN

## 2021-05-27 DIAGNOSIS — I48.0 PAROXYSMAL ATRIAL FIBRILLATION (HCC): Primary | ICD-10-CM

## 2021-05-27 PROCEDURE — 99214 OFFICE O/P EST MOD 30 MIN: CPT | Performed by: INTERNAL MEDICINE

## 2021-05-27 PROCEDURE — 99212 OFFICE O/P EST SF 10 MIN: CPT | Performed by: INTERNAL MEDICINE

## 2021-05-27 PROCEDURE — 1090F PRES/ABSN URINE INCON ASSESS: CPT | Performed by: INTERNAL MEDICINE

## 2021-05-27 PROCEDURE — G8427 DOCREV CUR MEDS BY ELIG CLIN: HCPCS | Performed by: INTERNAL MEDICINE

## 2021-05-27 PROCEDURE — 93005 ELECTROCARDIOGRAM TRACING: CPT | Performed by: INTERNAL MEDICINE

## 2021-05-27 PROCEDURE — 1036F TOBACCO NON-USER: CPT | Performed by: INTERNAL MEDICINE

## 2021-05-27 PROCEDURE — 4040F PNEUMOC VAC/ADMIN/RCVD: CPT | Performed by: INTERNAL MEDICINE

## 2021-05-27 PROCEDURE — 1123F ACP DISCUSS/DSCN MKR DOCD: CPT | Performed by: INTERNAL MEDICINE

## 2021-05-27 PROCEDURE — 93010 ELECTROCARDIOGRAM REPORT: CPT | Performed by: INTERNAL MEDICINE

## 2021-05-27 PROCEDURE — G8417 CALC BMI ABV UP PARAM F/U: HCPCS | Performed by: INTERNAL MEDICINE

## 2021-05-27 PROCEDURE — G8399 PT W/DXA RESULTS DOCUMENT: HCPCS | Performed by: INTERNAL MEDICINE

## 2021-05-27 RX ORDER — AMIODARONE HYDROCHLORIDE 200 MG/1
200 TABLET ORAL DAILY
Qty: 30 TABLET | Refills: 3 | Status: SHIPPED | OUTPATIENT
Start: 2021-05-27 | End: 2021-10-19 | Stop reason: SDUPTHER

## 2021-05-27 NOTE — PROGRESS NOTES
Today's Date: 5/27/2021  Patient's Name: [place]  Patient's age: 79 y. o., 1940    CC: follow up for PAF and abnormal stress test     HPI:  [place]  is here for a work in appointment. Patient went back to atrial fibrillation 2 nights ago. She reports palpitations, irregular heartbeat and fatigue. Prior to this she had upper respiratory tract infection with cough and phlegm/bronchitis. Otherwise stable and denies any chest pain, dyspnea, orthopnea or lower extremity edema. Past Medical History:   has a past medical history of Arthritis, Cervical spine degeneration, Chronic renal insufficiency, stage II (mild), Colon polyps, Constipation, Cystocele, Diverticulitis of colon, Dyspepsia, GERD (gastroesophageal reflux disease), GI bleed, Head ache, Hyperlipidemia, Hypertension, Multiple melanocytic nevi, Mumps, Over weight, Panic attacks, Paroxysmal atrial fibrillation ([facility] [address] Positive cardiac stress test, Shingles, and Tachycardia. Past Surgical History:   has a past surgical history that includes back surgery (1985, 1986); Breast surgery (Right, 1994); Colonoscopy (2006, 2009); colectomy (10/2006); Uterine fibroid embolization (10/2006); bladder repair (5/6/2009); Colonoscopy (2016); Colonoscopy (03/2019); Cardiac catheterization (11/28/2000); and Cardiac catheterization (03/19/2021). Home Medications:  Prior to Admission medications    Medication Sig Start Date End Date Taking?  Authorizing Provider   amiodarone (CORDARONE) 200 MG tablet Take 1 tablet by mouth daily 5/27/21  Yes Nadia Canas MD   metoprolol tartrate (LOPRESSOR) 25 MG tablet Take 1 tablet by mouth 2 times daily TAKE 1 tablet twice daily 5/27/21  Yes Nadia Canas MD   apixaban (ELIQUIS) 2.5 MG TABS tablet Take 1 tablet by mouth 2 times daily 5/27/21  Yes Nadia Canas MD   simvastatin (ZOCOR) 20 MG tablet TAKE 1 TABLET EVERY EVENING 3/8/21  Yes Bhakti Alford MD   amLODIPine (NORVASC) 5 MG tablet Take 1 tablet by mouth daily 3/4/21  Yes Eliazar Menjivar MD   acetaminophen (TYLENOL) 325 MG tablet Take 650 mg by mouth every 6 hours as needed for Pain   Yes Historical Provider, MD   Cholecalciferol (VITAMIN D3) 1000 units CAPS Take by mouth Doubles her dose 3 days a week. On M-W-F   Yes Historical Provider, MD   Cyanocobalamin (B-12) 1000 MCG CAPS Take 1 tablet by mouth daily Indications: High Binding Capacity of Iron to Red Blood Cells    Yes Historical Provider, MD   polyethylene glycol (GLYCOLAX) powder Take 17 g by mouth daily   Yes Historical Provider, MD       Allergies:  Codeine, Morphine, and Other    Social History:   reports that she has never smoked. She has never used smokeless tobacco. She reports that she does not drink alcohol and does not use drugs. Family History: family history includes Emphysema in her father; Heart Disease in her sister; High Blood Pressure in her mother; Osteoporosis in her sister. No h/o sudden cardiac death. No for premature CAD    REVIEW OF SYSTEMS:    · Constitutional: there has been no unanticipated weight loss. There's been No change in energy level, No change in activity level. · Eyes: No visual changes or diplopia. No scleral icterus. · ENT: No Headaches, hearing loss or vertigo. No mouth sores or sore throat. · Cardiovascular: see above  · Respiratory: see above  · Gastrointestinal: No abdominal pain, appetite loss, blood in stools. · Genitourinary: No dysuria, trouble voiding, or hematuria. · Musculoskeletal:  No gait disturbance, No weakness or joint complaints. · Integumentary: No rash or pruritis. · Neurological: No headache or diplopia. No tingling  · Psychiatric: No anxiety, or depression. · Endocrine: No temperature intolerance. · Hematologic/Lymphatic: No abnormal bruising or bleeding, blood clots or swollen lymph nodes. · Allergic/Immunologic: No nasal congestion or hives.       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  -Hx of GI bleed hospitalized at [facility] followed by colonoscopy by Dr. 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 -CKD        Plan of Treatment:      Increase metoprolol to 25 mg twice daily  Increase amiodarone to 200 mg daily  I had long discussion with patient regarding anticoagulation as she is back in atrial fibrillation. Her chads vas score is 4. She was previously taken off anticoagulation due to GI bleed and AV malformation in the colon. Risks and benefits of anticoagulation discussed with her. She would like to give a try and restart anticoagulation with careful monitoring of any bleeding episodes. We will start Eliquis 2.5 mg twice daily  Discontinue aspirin  Repeat EKG early next week to decide regarding JOANNA and cardioversion if needed. Jennifer Cordero MD   [facility]  [facility]  [phone]

## 2021-05-27 NOTE — TELEPHONE ENCOUNTER
Pt saw Dr Zahc Hopkins 5/27/21. He wants her to have EKG next Tuesday morning in our office and have nurse route to him in this telephone encounter. He will order CV at that time if needed. Booked as a dr visit currently and can be switched to a nurse visit. Do not send EKG to dr here.

## 2021-06-01 ENCOUNTER — TELEPHONE (OUTPATIENT)
Dept: CARDIOLOGY | Age: 81
End: 2021-06-01

## 2021-06-01 ENCOUNTER — PROCEDURE VISIT (OUTPATIENT)
Dept: CARDIOLOGY | Age: 81
End: 2021-06-01
Payer: MEDICARE

## 2021-06-01 DIAGNOSIS — I48.0 PAROXYSMAL ATRIAL FIBRILLATION (HCC): Primary | ICD-10-CM

## 2021-06-01 DIAGNOSIS — Z01.89 ENCOUNTER FOR CARDIOVERSION PROCEDURE: Primary | ICD-10-CM

## 2021-06-01 PROCEDURE — 93005 ELECTROCARDIOGRAM TRACING: CPT | Performed by: INTERNAL MEDICINE

## 2021-06-01 PROCEDURE — 93010 ELECTROCARDIOGRAM REPORT: CPT | Performed by: INTERNAL MEDICINE

## 2021-06-01 NOTE — TELEPHONE ENCOUNTER
Pt notified and orders faxed to [facility], [facility].

## 2021-06-09 DIAGNOSIS — Z12.31 VISIT FOR SCREENING MAMMOGRAM: Primary | ICD-10-CM

## 2021-06-11 ENCOUNTER — HOSPITAL ENCOUNTER (OUTPATIENT)
Dept: CARDIAC CATH/INVASIVE PROCEDURES | Age: 81
Discharge: HOME | End: 2021-06-11
Payer: MEDICARE

## 2021-06-11 VITALS
HEART RATE: 57 BPM | DIASTOLIC BLOOD PRESSURE: 53 MMHG | BODY MASS INDEX: 36.8 KG/M2 | TEMPERATURE: 97.7 F | HEIGHT: 62 IN | SYSTOLIC BLOOD PRESSURE: 114 MMHG | OXYGEN SATURATION: 98 % | RESPIRATION RATE: 16 BRPM | WEIGHT: 200 LBS

## 2021-06-11 LAB
GFR NON-AFRICAN AMERICAN: 40 ML/MIN
GFR SERPL CREATININE-BSD FRML MDRD: 48 ML/MIN
GFR SERPL CREATININE-BSD FRML MDRD: ABNORMAL ML/MIN/{1.73_M2}
GLUCOSE BLD-MCNC: 112 MG/DL (ref 74–100)
LV EF: 50 %
LVEF MODALITY: NORMAL
POC BUN: 15 MG/DL (ref 8–26)
POC CHLORIDE: 110 MMOL/L (ref 98–107)
POC CREATININE: 1.28 MG/DL (ref 0.51–1.19)
POC HEMATOCRIT: 39 % (ref 36–46)
POC HEMOGLOBIN: 13.1 G/DL (ref 12–16)
POC POTASSIUM: 3.6 MMOL/L (ref 3.5–4.5)
POC SODIUM: 145 MMOL/L (ref 138–146)

## 2021-06-11 PROCEDURE — 7100000001 HC PACU RECOVERY - ADDTL 15 MIN

## 2021-06-11 PROCEDURE — 93005 ELECTROCARDIOGRAM TRACING: CPT | Performed by: INTERNAL MEDICINE

## 2021-06-11 PROCEDURE — 92960 CARDIOVERSION ELECTRIC EXT: CPT | Performed by: INTERNAL MEDICINE

## 2021-06-11 PROCEDURE — 6360000002 HC RX W HCPCS

## 2021-06-11 PROCEDURE — 82947 ASSAY GLUCOSE BLOOD QUANT: CPT

## 2021-06-11 PROCEDURE — 84295 ASSAY OF SERUM SODIUM: CPT

## 2021-06-11 PROCEDURE — 93312 ECHO TRANSESOPHAGEAL: CPT

## 2021-06-11 PROCEDURE — 84520 ASSAY OF UREA NITROGEN: CPT

## 2021-06-11 PROCEDURE — 2709999900 HC NON-CHARGEABLE SUPPLY

## 2021-06-11 PROCEDURE — 93325 DOPPLER ECHO COLOR FLOW MAPG: CPT

## 2021-06-11 PROCEDURE — 82565 ASSAY OF CREATININE: CPT

## 2021-06-11 PROCEDURE — 76377 3D RENDER W/INTRP POSTPROCES: CPT

## 2021-06-11 PROCEDURE — 84132 ASSAY OF SERUM POTASSIUM: CPT

## 2021-06-11 PROCEDURE — 7100000000 HC PACU RECOVERY - FIRST 15 MIN

## 2021-06-11 PROCEDURE — 93320 DOPPLER ECHO COMPLETE: CPT

## 2021-06-11 PROCEDURE — 85014 HEMATOCRIT: CPT

## 2021-06-11 PROCEDURE — 82435 ASSAY OF BLOOD CHLORIDE: CPT

## 2021-06-11 RX ORDER — SODIUM CHLORIDE 9 MG/ML
INJECTION, SOLUTION INTRAVENOUS CONTINUOUS
Status: DISCONTINUED | OUTPATIENT
Start: 2021-06-11 | End: 2021-06-12 | Stop reason: HOSPADM

## 2021-06-11 RX ORDER — FUROSEMIDE 20 MG/1
20 TABLET ORAL DAILY
Qty: 60 TABLET | Refills: 3 | Status: SHIPPED | OUTPATIENT
Start: 2021-06-11 | End: 2021-10-19 | Stop reason: SDUPTHER

## 2021-06-11 RX ADMIN — SODIUM CHLORIDE: 9 INJECTION, SOLUTION INTRAVENOUS at 08:55

## 2021-06-11 NOTE — H&P
[facility]  Procedure History and Physical Update          Patient Name: Farzad Estrella  MRN:    [id number]  YOB: 1940  Date of evaluation:  6/11/2021    Procedure:    JOANNA/CV     Indication for procedure:  A fib      Please refer to the office note completed by Dr. Pastor Rabago on 5/27/21 in the medical record and note that:    [x] I have examined the patient and reviewed the H&P/Consult and there are no changes to be made to the assessment or plan. [] I have examined the patient and reviewed the H&P/Consult and have noted the following changes:    Past Medical History:   Diagnosis Date    Arthritis     degenerative    Cervical spine degeneration     Chronic renal insufficiency, stage II (mild)     Colon polyps     history of     Constipation     Cystocele     Diverticulitis of colon     history of     Dyspepsia     history of     GERD (gastroesophageal reflux disease)     GI bleed Jan 2016    Head ache     Hyperlipidemia     Hypertension     Multiple melanocytic nevi     Mumps     Over weight     Panic attacks     history of     Paroxysmal atrial fibrillation (HCC)     Positive cardiac stress test 03/2021    Shingles     history of     Tachycardia     episodic       Past Surgical History:   Procedure Laterality Date    BACK SURGERY  1985, 1986    BLADDER REPAIR  05/06/2009    Anterior repair with Prolift mesh.  BREAST SURGERY Right 1994    biopsy, benign    CARDIAC CATHETERIZATION  11/28/2000    CARDIAC CATHETERIZATION  03/19/2021    CARDIOVERSION  06/11/2021    COLECTOMY  10/2006    COLONOSCOPY  2006, 2009    x2    COLONOSCOPY  2016    Dr. Abran Vizcarra.   Repeat 2019    COLONOSCOPY  03/2019    Cecal Polyp  [facility]    TRANSESOPHAGEAL ECHOCARDIOGRAM  06/11/2021    UTERINE FIBROID EMBOLIZATION  10/2006       Family History   Problem Relation Age of Onset    High Blood Pressure Mother     Emphysema Father     Osteoporosis Sister  Heart Disease Sister         CHF       Allergies   Allergen Reactions    Codeine Other (See Comments)     nightmares    Morphine Other (See Comments)     Nightmares      Other      Bee stings - swelling       Prior to Admission medications    Medication Sig Start Date End Date Taking? Authorizing Provider   amiodarone (CORDARONE) 200 MG tablet Take 1 tablet by mouth daily 5/27/21  Yes Yogesh Kirk MD   metoprolol tartrate (LOPRESSOR) 25 MG tablet Take 1 tablet by mouth 2 times daily TAKE 1 tablet twice daily 5/27/21  Yes Yogesh Kirk MD   apixaban (ELIQUIS) 2.5 MG TABS tablet Take 1 tablet by mouth 2 times daily 5/27/21  Yes Yogesh Kirk MD   simvastatin (ZOCOR) 20 MG tablet TAKE 1 TABLET EVERY EVENING 3/8/21  Yes Carine Negron MD   amLODIPine (NORVASC) 5 MG tablet Take 1 tablet by mouth daily 3/4/21  Yes Yogesh Kirk MD   acetaminophen (TYLENOL) 325 MG tablet Take 650 mg by mouth every 6 hours as needed for Pain   Yes Historical Provider, MD   Cholecalciferol (VITAMIN D3) 1000 units CAPS Take by mouth Doubles her dose 3 days a week. On M-W-F   Yes Historical Provider, MD   Cyanocobalamin (B-12) 1000 MCG CAPS Take 1 tablet by mouth daily Indications: High Binding Capacity of Iron to Red Blood Cells    Yes Historical Provider, MD   polyethylene glycol (GLYCOLAX) powder Take 17 g by mouth daily   Yes Historical Provider, MD         Vitals:    06/11/21 0830   BP: 139/79   Pulse: 88   Resp: 17   Temp: 97.7 °F (36.5 °C)   SpO2: 97%       Constitutional and General Appearance:   alert, cooperative, no distress and appears stated age  HEENT:  · PERRL, EOMI  Respiratory:  · Normal excursion and expansion without use of accessory muscles  · Resp Auscultation:  Good respiratory effort. No for increased work of breathing. On auscultation: clear to auscultation bilaterally  Cardiovascular:  · Irregularly irregular  · S1/S2  · No murmurs.   · The apical impulse is not displaced  Abdomen:  · Soft  · Bowel sounds present  · Non-tender to palpation  Extremities:  · No cyanosis or clubbing  · Lower extremity edema: No.  Skin:  · Warm and dry  Neurological:  · Alert and oriented. · Moves all extremities well    Impression:  -PAF, currently in atrial fibrillation, rate controlled, CHADS-VASc: 4  -Mild nonobstructive CAD on cath (march 2021)   -HTN, fairly controlled at home  -HLP  -Preserved LV systolic function.  -Hx of GI bleed hospitalized at [facility] followed by colonoscopy by Dr. Valerie Robbins noted to have AV malformation and benign polyps by biopsy and anticoagulation was discontinued  -Diastolic dysfunction: no signs of volume overload. -CKD    Plan:  · Proceed with planned procedure. · Further orders to follow. Transesophageal echocardiography (procedure) has been fully reviewed with the patient and written informed consent has been obtained. The risks, benefits, and alternatives were discussed, in detail, with patient. Briefly, the potential risks include, but are not limited to, bleeding, damage to teeth or pharynx, esophageal damage or rupture, chamber perforation, tamponade, respiratory failure requiring intubation, need for emergent surgery, and even death. All questions and concerns were answered. Patient agreed to proceed with the procedure understanding the above risks and alternatives to the procedure. Risks, benefits, alternatives, and details discussed extensively. Accepts and consents.       Electronically signed on 06/11/21 at 9:16 AM by:    Alma Delia Jimenez MD, MD   Fellow, [address]

## 2021-06-11 NOTE — PROGRESS NOTES
All discharge instructions reviewed, questions answered, paper signed and given copy. Patient discharged  with family and belongings.

## 2021-06-11 NOTE — PROGRESS NOTES
Received post procedure to [facility] to room 6. Assessment obtained. Restrictions reviewed with patient. Post procedure pathway initiated. Family at side. Patient without complaints.

## 2021-06-11 NOTE — PROCEDURES
Aries Chanel MD on 6/11/2021 at 10:09 AM  Fellow, [address] [place] [address]      Attending Physician    Dinesh Soto MD, Mateusz Hughes

## 2021-06-11 NOTE — PROGRESS NOTES
Patient admitted, consent signed and questions answered. Patient ready for procedure. Call light to reach with side rails up 2 of 2. .  family at bedside with patient. History and physical needing update.

## 2021-06-12 LAB
EKG ATRIAL RATE: 55 BPM
EKG Q-T INTERVAL: 412 MS
EKG QRS DURATION: 96 MS
EKG QTC CALCULATION (BAZETT): 493 MS
EKG R AXIS: 0 DEGREES
EKG T AXIS: 119 DEGREES
EKG VENTRICULAR RATE: 86 BPM

## 2021-06-12 PROCEDURE — 93010 ELECTROCARDIOGRAM REPORT: CPT | Performed by: INTERNAL MEDICINE

## 2021-06-23 ENCOUNTER — HOSPITAL ENCOUNTER (OUTPATIENT)
Dept: MAMMOGRAPHY | Age: 81
Discharge: HOME | End: 2021-06-25
Payer: MEDICARE

## 2021-06-23 DIAGNOSIS — R92.8 ABNORMAL MAMMOGRAM OF RIGHT BREAST: Primary | ICD-10-CM

## 2021-06-23 DIAGNOSIS — Z12.31 VISIT FOR SCREENING MAMMOGRAM: ICD-10-CM

## 2021-06-23 PROCEDURE — 77063 BREAST TOMOSYNTHESIS BI: CPT

## 2021-06-25 ENCOUNTER — TELEPHONE (OUTPATIENT)
Dept: MAMMOGRAPHY | Age: 81
End: 2021-06-25

## 2021-06-25 ENCOUNTER — HOSPITAL ENCOUNTER (OUTPATIENT)
Dept: MAMMOGRAPHY | Age: 81
Discharge: HOME | End: 2021-06-27
Payer: MEDICARE

## 2021-06-25 DIAGNOSIS — N63.10 BREAST MASS, RIGHT: Primary | ICD-10-CM

## 2021-06-25 DIAGNOSIS — R92.8 ABNORMAL MAMMOGRAM OF RIGHT BREAST: ICD-10-CM

## 2021-06-25 PROCEDURE — G0279 TOMOSYNTHESIS, MAMMO: HCPCS

## 2021-06-25 NOTE — TELEPHONE ENCOUNTER
This patient is in need of breast biopsy she has just started Eliquis. Would it be alright for her to stop that for 24 hours before her biopsy? Please Advise.  Thanks

## 2021-07-01 ENCOUNTER — OFFICE VISIT (OUTPATIENT)
Dept: CARDIOLOGY | Age: 81
End: 2021-07-01
Payer: MEDICARE

## 2021-07-01 ENCOUNTER — INITIAL CONSULT (OUTPATIENT)
Dept: SURGERY | Age: 81
End: 2021-07-01
Payer: MEDICARE

## 2021-07-01 VITALS
WEIGHT: 198 LBS | HEART RATE: 100 BPM | DIASTOLIC BLOOD PRESSURE: 72 MMHG | TEMPERATURE: 96 F | SYSTOLIC BLOOD PRESSURE: 112 MMHG | HEIGHT: 62 IN | BODY MASS INDEX: 36.44 KG/M2

## 2021-07-01 VITALS
BODY MASS INDEX: 36.07 KG/M2 | HEART RATE: 86 BPM | WEIGHT: 196 LBS | SYSTOLIC BLOOD PRESSURE: 110 MMHG | DIASTOLIC BLOOD PRESSURE: 69 MMHG | HEIGHT: 62 IN

## 2021-07-01 DIAGNOSIS — R92.8 ABNORMAL FINDING ON BREAST IMAGING: Primary | ICD-10-CM

## 2021-07-01 DIAGNOSIS — I48.0 PAROXYSMAL ATRIAL FIBRILLATION (HCC): Primary | ICD-10-CM

## 2021-07-01 PROCEDURE — 99214 OFFICE O/P EST MOD 30 MIN: CPT | Performed by: INTERNAL MEDICINE

## 2021-07-01 PROCEDURE — 1090F PRES/ABSN URINE INCON ASSESS: CPT | Performed by: INTERNAL MEDICINE

## 2021-07-01 PROCEDURE — G8417 CALC BMI ABV UP PARAM F/U: HCPCS | Performed by: SURGERY

## 2021-07-01 PROCEDURE — 4040F PNEUMOC VAC/ADMIN/RCVD: CPT | Performed by: SURGERY

## 2021-07-01 PROCEDURE — G8427 DOCREV CUR MEDS BY ELIG CLIN: HCPCS | Performed by: INTERNAL MEDICINE

## 2021-07-01 PROCEDURE — G8399 PT W/DXA RESULTS DOCUMENT: HCPCS | Performed by: SURGERY

## 2021-07-01 PROCEDURE — 4040F PNEUMOC VAC/ADMIN/RCVD: CPT | Performed by: INTERNAL MEDICINE

## 2021-07-01 PROCEDURE — G8399 PT W/DXA RESULTS DOCUMENT: HCPCS | Performed by: INTERNAL MEDICINE

## 2021-07-01 PROCEDURE — G8417 CALC BMI ABV UP PARAM F/U: HCPCS | Performed by: INTERNAL MEDICINE

## 2021-07-01 PROCEDURE — 99203 OFFICE O/P NEW LOW 30 MIN: CPT | Performed by: SURGERY

## 2021-07-01 PROCEDURE — 1123F ACP DISCUSS/DSCN MKR DOCD: CPT | Performed by: INTERNAL MEDICINE

## 2021-07-01 PROCEDURE — 1036F TOBACCO NON-USER: CPT | Performed by: INTERNAL MEDICINE

## 2021-07-01 PROCEDURE — 1036F TOBACCO NON-USER: CPT | Performed by: SURGERY

## 2021-07-01 PROCEDURE — 1123F ACP DISCUSS/DSCN MKR DOCD: CPT | Performed by: SURGERY

## 2021-07-01 PROCEDURE — 1090F PRES/ABSN URINE INCON ASSESS: CPT | Performed by: SURGERY

## 2021-07-01 PROCEDURE — 99213 OFFICE O/P EST LOW 20 MIN: CPT

## 2021-07-01 PROCEDURE — G8427 DOCREV CUR MEDS BY ELIG CLIN: HCPCS | Performed by: SURGERY

## 2021-07-01 PROCEDURE — 93005 ELECTROCARDIOGRAM TRACING: CPT | Performed by: INTERNAL MEDICINE

## 2021-07-01 PROCEDURE — 93010 ELECTROCARDIOGRAM REPORT: CPT | Performed by: INTERNAL MEDICINE

## 2021-07-01 NOTE — PROGRESS NOTES
Today's Date: 7/1/2021  Patient's Name: [place]  Patient's age: 79 y. o., 1940    CC: follow up for PAF   HPI:  [place]  is here for follow up after recent JOANNA guided Cardioversion at [facility] with successful conversion to NSR. Today, she is back in atrial fibrillation, heart rate is controlled. She reports that she was in NSR only for a week post cardioversion    Denies any chest pain or dyspnea otherwise. Does report irregular heart beat and extreme fatigue since back in A-Fib    Past Medical History:   has a past medical history of Arthritis, Cervical spine degeneration, Chronic renal insufficiency, stage II (mild), Colon polyps, Constipation, Cystocele, Diverticulitis of colon, Dyspepsia, GERD (gastroesophageal reflux disease), GI bleed, Head ache, Hyperlipidemia, Hypertension, Multiple melanocytic nevi, Mumps, Over weight, Panic attacks, Paroxysmal atrial fibrillation ([address] Positive cardiac stress test, Shingles, and Tachycardia. Past Surgical History:   has a past surgical history that includes back surgery (1985, 1986); Colonoscopy (2006, 2009); colectomy (10/2006); Uterine fibroid embolization (10/2006); bladder repair (05/06/2009); Colonoscopy (2016); Colonoscopy (03/2019); Cardiac catheterization (11/28/2000); Cardiac catheterization (03/19/2021); transesophageal echocardiogram (06/11/2021); Cardioversion (06/11/2021); pr biopsy of breast, incisional (Right, 1994); pr punc/aspir breast cyst (Right); and Breast surgery (Right, 1994). Home Medications:  Prior to Admission medications    Medication Sig Start Date End Date Taking?  Authorizing Provider   furosemide (LASIX) 20 MG tablet Take 1 tablet by mouth daily 6/11/21   Nimo Mahmood MD   amiodarone (CORDARONE) 200 MG tablet Take 1 tablet by mouth daily 5/27/21   Anai Fernandez MD   metoprolol tartrate (LOPRESSOR) 25 MG tablet Take 1 tablet by mouth 2 times daily TAKE 1 tablet twice daily 5/27/21   Hemindermeet Shant Pollard MD   apixaban (ELIQUIS) 2.5 MG TABS tablet Take 1 tablet by mouth 2 times daily 5/27/21   Kelly Marx MD   simvastatin (ZOCOR) 20 MG tablet TAKE 1 TABLET EVERY EVENING 3/8/21   Reece Vicente MD   amLODIPine (NORVASC) 5 MG tablet Take 1 tablet by mouth daily 3/4/21   Kelly Marx MD   acetaminophen (TYLENOL) 325 MG tablet Take 650 mg by mouth every 6 hours as needed for Pain    Historical Provider, MD   Cholecalciferol (VITAMIN D3) 1000 units CAPS Take by mouth Doubles her dose 3 days a week. On M-W-F    Historical Provider, MD   Cyanocobalamin (B-12) 1000 MCG CAPS Take 1 tablet by mouth daily Indications: High Binding Capacity of Iron to Red Blood Cells     Historical Provider, MD   polyethylene glycol (GLYCOLAX) powder Take 17 g by mouth daily    Historical Provider, MD       Allergies:  Codeine, Morphine, and Other    Social History:   reports that she has never smoked. She has never used smokeless tobacco. She reports that she does not drink alcohol and does not use drugs. Family History: family history includes Emphysema in her father; Heart Disease in her sister; High Blood Pressure in her mother; Osteoporosis in her sister. No h/o sudden cardiac death. No for premature CAD      REVIEW OF SYSTEMS:    · Constitutional: there has been no unanticipated weight loss. There's been No change in energy level, No change in activity level. · Eyes: No visual changes or diplopia. No scleral icterus. · ENT: No Headaches, hearing loss or vertigo. No mouth sores or sore throat. · Cardiovascular: see above  · Respiratory: see above  · Gastrointestinal: No abdominal pain, appetite loss, blood in stools. · Genitourinary: No dysuria, trouble voiding, or hematuria. · Musculoskeletal:  No gait disturbance, No weakness or joint complaints. · Integumentary: No rash or pruritis. · Neurological: No headache or diplopia. No tingling  · Psychiatric: No anxiety, or depression.   · Endocrine: No temperature intolerance. · Hematologic/Lymphatic: No abnormal bruising or bleeding, blood clots or swollen lymph nodes. · Allergic/Immunologic: No nasal congestion or hives. PHYSICAL EXAM:      There were no vitals filed for this visit. Constitutional and General Appearance: alert, cooperative, no distress and appears stated age  [de-identified]: PERRL, no cervical lymphadenopathy. No masses palpable. Normal oral mucosa  Respiratory:  · Normal excursion and expansion without use of accessory muscles  · Resp Auscultation: Good respiratory effort. No for increased work of breathing. On auscultation: clear to auscultation bilaterally  Cardiovascular:  · Heart tones are irregularly irregular  · Jugular venous pulsation Normal  · The carotid upstroke is normal in amplitude and contour without delay or bruit   Abdomen:   · soft  · Bowel sounds present  Extremities:  ·  No edema  Neurological:  · Alert and oriented. Cardiac Data:  EKG 7/1/2021: atrial fibrillation, heart rate 80-85 at rest, nonspecific T wave abnormality      Labs:   LABS  Lab Results   Component Value Date    CHOL 164 07/20/2020    TRIG 98 07/20/2020    HDL 70 07/20/2020    LDLCHOLESTEROL 74 07/20/2020    VLDL NOT REPORTED 07/20/2020    CHOLHDLRATIO 2.3 07/20/2020       Lab Results   Component Value Date     03/19/2021    K 5.3 03/19/2021     03/19/2021    CO2 28 03/19/2021    BUN 21 03/19/2021    CREATININE 1.28 (H) 06/11/2021    GLUCOSE 109 (H) 03/19/2021    CALCIUM 10.2 03/19/2021    PROT 7.0 07/20/2020    LABALBU 4.4 07/20/2020    BILITOT 0.63 07/20/2020    ALKPHOS 68 07/20/2020    AST 14 07/20/2020    ALT 10 07/20/2020    LABGLOM 40 (L) 06/11/2021    GFRAA 47 (L) 03/19/2021       Nuclear stress test 6/11/19  1. Very small area with mild ischemia laterally. 2. LVEF 70%.       Nuclear stress test 11/30/2020  1. Small area of apical-lateral ischemia. 2.  LVEF 70%. Cardiac cath 3/19/21  LMCA: Normal 0% stenosis.   LAD: Mild

## 2021-07-01 NOTE — PROGRESS NOTES
Breast Evaluation Work Sheet    2021    Patient:Anum Oliveira               :1940    Race:     Age of Menarche: 14    Age of 1st live Birth (zero if no live births): 24    Number of Pregnancies: 4  Number of live births: 4    LMP: 18    Number of previous breast biopsies: 3  Any with atypical pathology No    History DCIS or LCIS: No    Personal History of other Cancers: No       Family History Breast Cancer and Age of onset:     Mother No               Sister(s) No    Maternal GM No      Daughter(s) No    Paternal GM No       Other(s) No    History of other breast problems:    Nipple Drainage: No        Pain: N/A    Skin Changes: none    No h/o breastfeeding  HRT for a few months after hysterectomy, but it caused bleeding  BCP between babies 1 and 3

## 2021-07-06 NOTE — PROGRESS NOTES
Edwin Armando is a 79 y.o. female who presents today for evaluation after abnormal imaging of right breast.  Patient states that she had her regular mammogram which showed new finding right breast and subsequently undergone ultrasound and diagnostic mammogram.  These did confirm the finding. On discussion patient denies any recent changes to breast.  Has not noticed any skin or nipple changes. No palpable masses. No nipple discharge. Denies any family history. Past Medical History:   Diagnosis Date    Arthritis     degenerative    Cervical spine degeneration     Chronic renal insufficiency, stage II (mild)     Colon polyps     history of     Constipation     Cystocele     Diverticulitis of colon     history of     Dyspepsia     history of     GERD (gastroesophageal reflux disease)     GI bleed Jan 2016    Head ache     Hyperlipidemia     Hypertension     Multiple melanocytic nevi     Mumps     Over weight     Panic attacks     history of     Paroxysmal atrial fibrillation (HCC)     Positive cardiac stress test 03/2021    Shingles     history of     Tachycardia     episodic       Past Surgical History:   Procedure Laterality Date    BACK SURGERY  1985, 1986    BLADDER REPAIR  05/06/2009    Anterior repair with Prolift mesh.  BREAST SURGERY Right 1994    biopsy, benign    CARDIAC CATHETERIZATION  11/28/2000    CARDIAC CATHETERIZATION  03/19/2021    CARDIOVERSION  06/11/2021    COLECTOMY  10/2006    COLONOSCOPY  2006, 2009    x2    COLONOSCOPY  2016    Dr. Estrella Rodriguez.   Repeat 2019    COLONOSCOPY  03/2019    [facility]yp  [facility]    AR BIOPSY OF BREAST, INCISIONAL Right 1994    benign    AR PUNC/ASPIR BREAST CYST Right     unsure date    TRANSESOPHAGEAL ECHOCARDIOGRAM  06/11/2021    UTERINE FIBROID EMBOLIZATION  10/2006       Current Outpatient Medications   Medication Sig Dispense Refill    furosemide (LASIX) 20 MG tablet Take 1 tablet by mouth daily 60 tablet 3    amiodarone (CORDARONE) 200 MG tablet Take 1 tablet by mouth daily 30 tablet 3    metoprolol tartrate (LOPRESSOR) 25 MG tablet Take 1 tablet by mouth 2 times daily TAKE 1 tablet twice daily 60 tablet 3    apixaban (ELIQUIS) 2.5 MG TABS tablet Take 1 tablet by mouth 2 times daily 180 tablet 1    simvastatin (ZOCOR) 20 MG tablet TAKE 1 TABLET EVERY EVENING 90 tablet 3    amLODIPine (NORVASC) 5 MG tablet Take 1 tablet by mouth daily 90 tablet 1    acetaminophen (TYLENOL) 325 MG tablet Take 650 mg by mouth every 6 hours as needed for Pain      Cholecalciferol (VITAMIN D3) 1000 units CAPS Take by mouth Doubles her dose 3 days a week. On M-W-F      Cyanocobalamin (B-12) 1000 MCG CAPS Take 1 tablet by mouth daily Indications: High Binding Capacity of Iron to Red Blood Cells       polyethylene glycol (GLYCOLAX) powder Take 17 g by mouth daily       No current facility-administered medications for this visit.        Allergies   Allergen Reactions    Adhesive Tape      rash    Codeine Other (See Comments)     nightmares    Morphine Other (See Comments)     Nightmares      Other      Bee stings - swelling       Family History   Problem Relation Age of Onset    High Blood Pressure Mother     Emphysema Father     Osteoporosis Sister     Heart Disease Sister         CHF       Social History     Socioeconomic History    Marital status:      Spouse name: Not on file    Number of children: Not on file    Years of education: Not on file    Highest education level: Not on file   Occupational History    Not on file   Tobacco Use    Smoking status: Never Smoker    Smokeless tobacco: Never Used    Tobacco comment: shivani iglesias 7/15/17   Vaping Use    Vaping Use: Never used   Substance and Sexual Activity    Alcohol use: No    Drug use: No    Sexual activity: Yes     Partners: Male   Other Topics Concern    Not on file   Social History Narrative    Not on file     Social Determinants of Health     Financial Resource Strain: Low Risk     Difficulty of Paying Living Expenses: Not hard at all   Food Insecurity: No Food Insecurity    Worried About Running Out of Food in the Last Year: Never true    Kathi of Food in the Last Year: Never true   Transportation Needs:     Lack of Transportation (Medical):  Lack of Transportation (Non-Medical):    Physical Activity:     Days of Exercise per Week:     Minutes of Exercise per Session:    Stress:     Feeling of Stress :    Social Connections:     Frequency of Communication with Friends and Family:     Frequency of Social Gatherings with Friends and Family:     Attends Latter day Services:     Active Member of Clubs or Organizations:     Attends Club or Organization Meetings:     Marital Status:    Intimate Partner Violence:     Fear of Current or Ex-Partner:     Emotionally Abused:     Physically Abused:     Sexually Abused:        ROS:   Review of Systems - General ROS: negative  Psychological ROS: negative  Ophthalmic ROS: negative  ENT ROS: negative  Breast ROS: per HPI  Respiratory ROS: no cough, shortness of breath, or wheezing  Cardiovascular ROS: no chest pain or dyspnea on exertion  Gastrointestinal ROS: no abdominal pain, change in bowel habits, or black or bloody stools  Genito-Urinary ROS: no dysuria, trouble voiding, or hematuria  Musculoskeletal ROS: negative      Objective   Vitals:    07/01/21 1541   BP: 112/72   Pulse: 100   Temp: 96 °F (35.6 °C)     General:in no apparent distress and well developed and well nourished  Eyes: No gross abnormalities.   Ears, Nose, Throat: hearing grossly normal bilaterally  Neck: neck supple and non tender without mass  Lungs: clear to auscultation without wheezes or rales   Heart: S1S2, no mumurs, RRR  Abdomen: soft, nontender, no HSM, no guarding, no rebound, no masses  Extremity: negative  Neuro: CN II-XII grossly intact      Assessment     3  71-year-old female with abnormal imaging of right breast      Plan     1. I have discussed with the patient her imaging findings and recommendations. We will plan for image guided needle biopsy of the lesion in question. Plan to see the patient back once we have results to discuss further recommendations.     Electronically signed by Ko Shepard DO on 7/6/2021 at 9:18 AM      (Please note that portions of this note were completed with a voice recognition program.  Efforts were made to edit the dictations but occasionally words are mis-transcribed.)

## 2021-07-08 ENCOUNTER — TELEPHONE (OUTPATIENT)
Dept: CARDIOLOGY | Age: 81
End: 2021-07-08

## 2021-07-08 NOTE — TELEPHONE ENCOUNTER
Pt called to ask about the ablation that she was told she could have done in [facility]. Pt is asking for the names of the doctors at these locations that would be doing the procedure.     [phone]    Last Appt:  7/1/2021  Next Appt:   10/21/2021  Med verified in [address] [place] [address]

## 2021-07-12 ENCOUNTER — HOSPITAL ENCOUNTER (OUTPATIENT)
Age: 81
Setting detail: SPECIMEN
Discharge: HOME | End: 2021-07-12
Payer: MEDICARE

## 2021-07-12 ENCOUNTER — HOSPITAL ENCOUNTER (OUTPATIENT)
Dept: MAMMOGRAPHY | Age: 81
Discharge: HOME | End: 2021-07-14
Payer: MEDICARE

## 2021-07-12 DIAGNOSIS — R92.8 ABNORMAL FINDING ON BREAST IMAGING: ICD-10-CM

## 2021-07-12 PROCEDURE — A4648 IMPLANTABLE TISSUE MARKER: HCPCS

## 2021-07-12 PROCEDURE — 2500000003 HC RX 250 WO HCPCS

## 2021-07-12 PROCEDURE — 88360 TUMOR IMMUNOHISTOCHEM/MANUAL: CPT

## 2021-07-12 PROCEDURE — 88305 TISSUE EXAM BY PATHOLOGIST: CPT

## 2021-07-14 LAB — SURGICAL PATHOLOGY REPORT: NORMAL

## 2021-07-15 ENCOUNTER — TELEPHONE (OUTPATIENT)
Dept: CARDIOLOGY | Age: 81
End: 2021-07-15

## 2021-07-15 ENCOUNTER — TELEPHONE (OUTPATIENT)
Dept: SURGERY | Age: 81
End: 2021-07-15

## 2021-07-15 NOTE — TELEPHONE ENCOUNTER
Patient called requesting biopsy results, please review and advise. Patient does have an appointment 7/20/21 to discuss further.

## 2021-07-15 NOTE — TELEPHONE ENCOUNTER
Verbal order per Dr Rodrigue Cortez. Patient to restart Eliquis. Can hold Eliquis 3 days prior to surgery then restart after if surgeon approves.

## 2021-07-15 NOTE — TELEPHONE ENCOUNTER
[place] called in this morning asking when she should start back up on her blood thinner. She had a biopsy done on 7/12/21 and has not taken blood thinners since 7/10/21.

## 2021-07-15 NOTE — TELEPHONE ENCOUNTER
Patient + for Breast Ca. Will be scheduling appointment for Lumpectomy  or mastectomy depending on surgeon. They are looking at surgery for July 30 th. Is the patient ok to remain off the Eliquis until after surgery? Or do you want her to go back on for a week. She will need to go off Eliquis again for this surgery.

## 2021-07-26 ENCOUNTER — HOSPITAL ENCOUNTER (OUTPATIENT)
Dept: NON INVASIVE DIAGNOSTICS | Age: 81
Discharge: HOME | End: 2021-07-26
Payer: MEDICARE

## 2021-07-26 ENCOUNTER — HOSPITAL ENCOUNTER (OUTPATIENT)
Dept: GENERAL RADIOLOGY | Age: 81
Discharge: HOME | End: 2021-07-28
Payer: MEDICARE

## 2021-07-26 ENCOUNTER — HOSPITAL ENCOUNTER (OUTPATIENT)
Dept: LAB | Age: 81
Discharge: HOME | End: 2021-07-26
Payer: MEDICARE

## 2021-07-26 DIAGNOSIS — D05.11 DUCTAL CARCINOMA IN SITU (DCIS) OF RIGHT BREAST: ICD-10-CM

## 2021-07-26 LAB
ABSOLUTE EOS #: 0.08 K/UL (ref 0–0.44)
ABSOLUTE IMMATURE GRANULOCYTE: <0.03 K/UL (ref 0–0.3)
ABSOLUTE LYMPH #: 1.17 K/UL (ref 1.1–3.7)
ABSOLUTE MONO #: 0.37 K/UL (ref 0.1–1.2)
ANION GAP SERPL CALCULATED.3IONS-SCNC: 11 MMOL/L (ref 9–17)
BASOPHILS # BLD: 1 % (ref 0–2)
BASOPHILS ABSOLUTE: 0.05 K/UL (ref 0–0.2)
BUN BLDV-MCNC: 16 MG/DL (ref 8–23)
BUN/CREAT BLD: 11 (ref 9–20)
CALCIUM SERPL-MCNC: 9.8 MG/DL (ref 8.6–10.4)
CHLORIDE BLD-SCNC: 104 MMOL/L (ref 98–107)
CO2: 28 MMOL/L (ref 20–31)
CREAT SERPL-MCNC: 1.5 MG/DL (ref 0.5–0.9)
DIFFERENTIAL TYPE: ABNORMAL
EKG ATRIAL RATE: 394 BPM
EKG Q-T INTERVAL: 402 MS
EKG QRS DURATION: 102 MS
EKG QTC CALCULATION (BAZETT): 491 MS
EKG R AXIS: 8 DEGREES
EKG T AXIS: 153 DEGREES
EKG VENTRICULAR RATE: 90 BPM
EOSINOPHILS RELATIVE PERCENT: 1 % (ref 1–4)
GFR AFRICAN AMERICAN: 40 ML/MIN
GFR NON-AFRICAN AMERICAN: 33 ML/MIN
GFR SERPL CREATININE-BSD FRML MDRD: ABNORMAL ML/MIN/{1.73_M2}
GFR SERPL CREATININE-BSD FRML MDRD: ABNORMAL ML/MIN/{1.73_M2}
GLUCOSE BLD-MCNC: 119 MG/DL (ref 70–99)
HCT VFR BLD CALC: 42.7 % (ref 36.3–47.1)
HEMOGLOBIN: 13.8 G/DL (ref 11.9–15.1)
IMMATURE GRANULOCYTES: 0 %
LYMPHOCYTES # BLD: 20 % (ref 24–43)
MCH RBC QN AUTO: 31.4 PG (ref 25.2–33.5)
MCHC RBC AUTO-ENTMCNC: 32.3 G/DL (ref 25.2–33.5)
MCV RBC AUTO: 97.3 FL (ref 82.6–102.9)
MONOCYTES # BLD: 6 % (ref 3–12)
NRBC AUTOMATED: 0 PER 100 WBC
PDW BLD-RTO: 12.7 % (ref 11.8–14.4)
PLATELET # BLD: 221 K/UL (ref 138–453)
PLATELET ESTIMATE: ABNORMAL
PMV BLD AUTO: 11 FL (ref 8.1–13.5)
POTASSIUM SERPL-SCNC: 3.8 MMOL/L (ref 3.7–5.3)
RBC # BLD: 4.39 M/UL (ref 3.95–5.11)
RBC # BLD: ABNORMAL 10*6/UL
SEG NEUTROPHILS: 72 % (ref 36–65)
SEGMENTED NEUTROPHILS ABSOLUTE COUNT: 4.09 K/UL (ref 1.5–8.1)
SODIUM BLD-SCNC: 143 MMOL/L (ref 135–144)
WBC # BLD: 5.8 K/UL (ref 3.5–11.3)
WBC # BLD: ABNORMAL 10*3/UL

## 2021-07-26 PROCEDURE — 93005 ELECTROCARDIOGRAM TRACING: CPT | Performed by: SURGERY

## 2021-07-26 PROCEDURE — 71046 X-RAY EXAM CHEST 2 VIEWS: CPT

## 2021-07-26 PROCEDURE — 85025 COMPLETE CBC W/AUTO DIFF WBC: CPT

## 2021-07-26 PROCEDURE — 80048 BASIC METABOLIC PNL TOTAL CA: CPT

## 2021-07-26 PROCEDURE — 36415 COLL VENOUS BLD VENIPUNCTURE: CPT

## 2021-07-29 NOTE — TELEPHONE ENCOUNTER
Pt daughter called after trying to get clearance for the breast surgery for cancer. Writer asked for the phone number to Dr. Lauren Knott to call and ask if they need a letter or if what they are trying to send is clearance form. Waiting for call back from the office.     Cece Gomez-  [phone]    Last Appt:  7/1/2021  Next Appt:   10/21/2021  Med verified in [address] [place] [address] Rd

## 2021-09-02 ENCOUNTER — TELEPHONE (OUTPATIENT)
Dept: CARDIOLOGY | Age: 81
End: 2021-09-02

## 2021-09-02 DIAGNOSIS — I48.91 ATRIAL FIBRILLATION, UNSPECIFIED TYPE (HCC): Primary | ICD-10-CM

## 2021-09-02 NOTE — TELEPHONE ENCOUNTER
[facility] called & stated they received the referral but did not have a phone # for patient & asked that we reach out to patient to call and register. I called patient & gave her the # for Cyn[facility] registration #[phone].

## 2021-09-02 NOTE — TELEPHONE ENCOUNTER
Patient would like referral to [facility] for ablation. Patient stated that Dr. Jen Auguste told her to let us know when she was ready to proceed with ablation. Nothing has been scheduled yet.

## 2021-09-16 ENCOUNTER — TELEPHONE (OUTPATIENT)
Dept: ONCOLOGY | Age: 81
End: 2021-09-16

## 2021-09-27 RX ORDER — AMLODIPINE BESYLATE 5 MG/1
TABLET ORAL
Qty: 90 TABLET | Refills: 3 | OUTPATIENT
Start: 2021-09-27

## 2021-09-27 NOTE — TELEPHONE ENCOUNTER
Pt is suppose to have 2 teeth tips extracted TOMORROW and the dental office needs to know if the pt needs to hold blood thinners or not. Please advise today.  Pt is able to take directions    [phone]

## 2021-09-27 NOTE — TELEPHONE ENCOUNTER
Last Appt:  7/1/2021  Next Appt:   10/21/2021  Med verified in [facility]

## 2021-09-27 NOTE — TELEPHONE ENCOUNTER
Writer called pt and notified her that per Dr Criselda Camacho it is okay to hold Eliquis 1-2 days prior. Pt verbalized understanding and stated she already took it today but will call and reschedule her procedure. Pt had no further questions at the time.

## 2021-10-04 RX ORDER — AMLODIPINE BESYLATE 5 MG/1
TABLET ORAL
Qty: 90 TABLET | Refills: 2 | Status: SHIPPED | OUTPATIENT
Start: 2021-10-04 | End: 2021-10-19 | Stop reason: SDUPTHER

## 2021-10-14 ENCOUNTER — OFFICE VISIT (OUTPATIENT)
Dept: FAMILY MEDICINE CLINIC | Age: 81
End: 2021-10-14
Payer: MEDICARE

## 2021-10-14 VITALS
HEART RATE: 102 BPM | BODY MASS INDEX: 35.41 KG/M2 | WEIGHT: 193.6 LBS | SYSTOLIC BLOOD PRESSURE: 118 MMHG | OXYGEN SATURATION: 96 % | DIASTOLIC BLOOD PRESSURE: 80 MMHG | TEMPERATURE: 96.9 F

## 2021-10-14 DIAGNOSIS — R21 RASH: ICD-10-CM

## 2021-10-14 DIAGNOSIS — Z23 NEEDS FLU SHOT: Primary | ICD-10-CM

## 2021-10-14 DIAGNOSIS — N18.32 STAGE 3B CHRONIC KIDNEY DISEASE (HCC): ICD-10-CM

## 2021-10-14 DIAGNOSIS — L30.4 INTERTRIGO: ICD-10-CM

## 2021-10-14 PROCEDURE — PBSHW INFLUENZA, QUADV, ADJUVANTED, 65 YRS +, IM, PF, PREFILL SYR, 0.5ML (FLUAD): Performed by: INTERNAL MEDICINE

## 2021-10-14 PROCEDURE — 1036F TOBACCO NON-USER: CPT | Performed by: INTERNAL MEDICINE

## 2021-10-14 PROCEDURE — G0008 ADMIN INFLUENZA VIRUS VAC: HCPCS | Performed by: INTERNAL MEDICINE

## 2021-10-14 PROCEDURE — G8484 FLU IMMUNIZE NO ADMIN: HCPCS | Performed by: INTERNAL MEDICINE

## 2021-10-14 PROCEDURE — 4040F PNEUMOC VAC/ADMIN/RCVD: CPT | Performed by: INTERNAL MEDICINE

## 2021-10-14 PROCEDURE — 1123F ACP DISCUSS/DSCN MKR DOCD: CPT | Performed by: INTERNAL MEDICINE

## 2021-10-14 PROCEDURE — 1090F PRES/ABSN URINE INCON ASSESS: CPT | Performed by: INTERNAL MEDICINE

## 2021-10-14 PROCEDURE — G8399 PT W/DXA RESULTS DOCUMENT: HCPCS | Performed by: INTERNAL MEDICINE

## 2021-10-14 PROCEDURE — 99214 OFFICE O/P EST MOD 30 MIN: CPT | Performed by: INTERNAL MEDICINE

## 2021-10-14 PROCEDURE — G8427 DOCREV CUR MEDS BY ELIG CLIN: HCPCS | Performed by: INTERNAL MEDICINE

## 2021-10-14 PROCEDURE — G8417 CALC BMI ABV UP PARAM F/U: HCPCS | Performed by: INTERNAL MEDICINE

## 2021-10-14 RX ORDER — NYSTATIN 100000 [USP'U]/G
POWDER TOPICAL
Qty: 60 G | Refills: 3 | Status: SHIPPED | OUTPATIENT
Start: 2021-10-14 | End: 2021-10-28

## 2021-10-14 RX ORDER — TAMOXIFEN CITRATE 20 MG/1
20 TABLET ORAL DAILY
COMMUNITY
End: 2022-07-19 | Stop reason: SINTOL

## 2021-10-14 NOTE — PROGRESS NOTES
[address] [address] [place] [id number]  Dept: [phone]  Dept Fax: [phone]  Loc: [phone]     Visit Date:  10/14/2021    Patient:  Lina Thorne  YOB: 1940    HPI:   Lina Thorne presents today for   Chief Complaint   Patient presents with    Rash     patient present c/o rash under abdominal fold itches a times x1 week requesting flu shot    . HPI-79year-old female is coming in today for rash that she noticed on her right abdominal area underneath her  abdominal fold been pruritic for 1 week time. Also needs flu shot. Discussed some of her chronic problems looks like she does have stage IIIb chronic kidney disease and recommended to start seeing a nephrologist.      Medications  Prior to Visit Medications    Medication Sig Taking? Authorizing Provider   tamoxifen (NOLVADEX) 20 MG tablet Take 20 mg by mouth daily Yes Historical Provider, MD   amLODIPine (NORVASC) 5 MG tablet take 1 tablet by mouth once daily Yes Thelma Murray MD   furosemide (LASIX) 20 MG tablet Take 1 tablet by mouth daily Yes Nimo Mahmood MD   amiodarone (CORDARONE) 200 MG tablet Take 1 tablet by mouth daily Yes Thelma Murray MD   metoprolol tartrate (LOPRESSOR) 25 MG tablet Take 1 tablet by mouth 2 times daily TAKE 1 tablet twice daily Yes Thelma Murray MD   apixaban (ELIQUIS) 2.5 MG TABS tablet Take 1 tablet by mouth 2 times daily Yes Thelma Murray MD   acetaminophen (TYLENOL) 325 MG tablet Take 650 mg by mouth every 6 hours as needed for Pain Yes Historical Provider, MD   Cholecalciferol (VITAMIN D3) 1000 units CAPS Take by mouth Doubles her dose 3 days a week.  On M-W-F Yes Historical Provider, MD   Cyanocobalamin (B-12) 1000 MCG CAPS Take 1 tablet by mouth daily Indications: High Binding Capacity of Iron to Red Blood Cells  Yes Historical Provider, MD 07/26/2022    DEXA (modify frequency per FRAX score)  Completed    Flu vaccine  Completed    Pneumococcal 65+ years Vaccine  Completed    COVID-19 Vaccine  Completed    Hepatitis A vaccine  Aged Out    Hepatitis B vaccine  Aged Out    Hib vaccine  Aged Out    Meningococcal (ACWY) vaccine  Aged Out       Subjective:      Review of Systems   Constitutional: Negative for fatigue, fever and unexpected weight change. HENT: Negative for ear pain, postnasal drip, rhinorrhea, sinus pain, sore throat and trouble swallowing. Eyes: Negative for visual disturbance. Respiratory: Negative for cough, chest tightness and shortness of breath. Cardiovascular: Negative for chest pain and leg swelling. Gastrointestinal: Negative for abdominal pain, blood in stool and diarrhea. Endocrine: Negative for polyuria. Genitourinary: Negative for difficulty urinating and flank pain. Musculoskeletal: Negative for arthralgias, joint swelling and myalgias. Skin: Positive for rash. Allergic/Immunologic: Negative for environmental allergies. Neurological: Negative for weakness, light-headedness, numbness and headaches. Hematological: Negative for adenopathy. Psychiatric/Behavioral: Negative for behavioral problems and suicidal ideas. The patient is not nervous/anxious. Objective:     /80   Pulse 102   Temp 96.9 °F (36.1 °C)   Wt 193 lb 9.6 oz (87.8 kg)   LMP  (LMP Unknown)   SpO2 96%   BMI 35.41 kg/m²     Physical Exam  Vitals and nursing note reviewed. HENT:      Head: Normocephalic and atraumatic. Cardiovascular:      Rate and Rhythm: Normal rate and regular rhythm. Pulses: Normal pulses. Heart sounds: Normal heart sounds. Pulmonary:      Effort: Pulmonary effort is normal.      Breath sounds: Normal breath sounds. No stridor. Skin:     General: Skin is warm. Findings: Erythema and rash present.              Comments: Red brown homogenous patch seen within skin folds classic for intertrigo. Neurological:      Mental Status: She is oriented to person, place, and time. Mental status is at baseline. Psychiatric:         Mood and Affect: Mood normal.             Assessment       Diagnosis Orders   1. Needs flu shot  nystatin (MYCOSTATIN) 143979 UNIT/GM powder    INFLUENZA, QUADV, ADJUVANTED, 65 YRS =, IM, PF, PREFILL SYR, 0.5ML (FLUAD)   2. Rash  nystatin (MYCOSTATIN) 620221 UNIT/GM powder   3. Intertrigo  nystatin (MYCOSTATIN) 373142 UNIT/GM powder   4. Stage 3b chronic kidney disease ([facility] [address]  Geneva Thorpe MD, Nephrology, [place]         PLAN     Nystatin powder for probable candida induced intertrigo. Nephrology consult for CKD stage IIIB. \"Practices aimed at minimizing moisture and friction in the involved area and reducing susceptibility to intertrigo are the mainstays of treatment. Typical beneficial practices include:  ?Daily cleansing of intertriginous skin with a mild cleanser followed by drying of affected area with a hair dryer on a cool setting  ? Aeration of affected area when feasible  ? Daily application of drying powders, such as powders composed of microporous cellulose  ? Use of absorbent material or clothing, such as cotton or head wool, to separate skin in folds  ? Application of barrier creams in areas that may come in contact with urine or feces  ? Treatment of hyperhidrosis in the affected area  ? Weight loss in persons who are overweight or obese  ? Appropriate treatment of coexisting diabetes mellitus\"      Orders Placed This Encounter   Procedures    INFLUENZA, QUADV, ADJUVANTED, 72 YRS =, IM, PF, PREFILL SYR, 0.5ML (FLUAD)   Geneva Thorpe MD, Nephrology, [place]     Referral Priority:   Routine     Referral Type:   Eval and Treat     Referral Reason:   Specialty Services Required     Referred to Provider:   Elizabeth Benítez MD     Requested Specialty:   Nephrology     Number of Visits Requested:   1        No follow-ups on file.    Patient given educational materials - see patient instructions. Discussed use, benefit, and side effects of prescribed medications. All patient questions answered. Pt voiced understanding. Reviewed health maintenance.        Electronically signed Eda Kirk MD on 10/14/2021 at 11:02 AM EDT

## 2021-10-15 ENCOUNTER — TELEPHONE (OUTPATIENT)
Dept: NEPHROLOGY | Age: 81
End: 2021-10-15

## 2021-10-15 NOTE — TELEPHONE ENCOUNTER
10/15/2021 Referral faxed to [facility] office to contact patient to schedule with Dr Umer Wei or Dr Radha Candelario at the [facility]. The [facility] office schedules patients for the [facility] nephrology clinic.     Faxed  to [phone]  Phone [phone]

## 2021-10-18 ASSESSMENT — ENCOUNTER SYMPTOMS
ABDOMINAL PAIN: 0
COUGH: 0
RHINORRHEA: 0
SHORTNESS OF BREATH: 0
SINUS PAIN: 0
DIARRHEA: 0
CHEST TIGHTNESS: 0
BLOOD IN STOOL: 0
TROUBLE SWALLOWING: 0
SORE THROAT: 0

## 2021-10-19 RX ORDER — AMLODIPINE BESYLATE 5 MG/1
TABLET ORAL
Qty: 90 TABLET | Refills: 3 | Status: SHIPPED | OUTPATIENT
Start: 2021-10-19 | End: 2021-10-28

## 2021-10-19 RX ORDER — AMIODARONE HYDROCHLORIDE 200 MG/1
200 TABLET ORAL DAILY
Qty: 90 TABLET | Refills: 3 | Status: SHIPPED | OUTPATIENT
Start: 2021-10-19 | End: 2022-07-19

## 2021-10-19 RX ORDER — FUROSEMIDE 20 MG/1
20 TABLET ORAL DAILY
Qty: 180 TABLET | Refills: 3 | Status: SHIPPED | OUTPATIENT
Start: 2021-10-19 | End: 2022-10-24

## 2021-10-19 RX ORDER — SIMVASTATIN 20 MG
TABLET ORAL
Qty: 90 TABLET | Refills: 3 | Status: SHIPPED | OUTPATIENT
Start: 2021-10-19

## 2021-10-28 ENCOUNTER — OFFICE VISIT (OUTPATIENT)
Dept: CARDIOLOGY | Age: 81
End: 2021-10-28
Payer: MEDICARE

## 2021-10-28 VITALS
DIASTOLIC BLOOD PRESSURE: 60 MMHG | HEIGHT: 62 IN | WEIGHT: 192 LBS | HEART RATE: 95 BPM | BODY MASS INDEX: 35.33 KG/M2 | SYSTOLIC BLOOD PRESSURE: 104 MMHG

## 2021-10-28 DIAGNOSIS — R42 DIZZINESS: ICD-10-CM

## 2021-10-28 DIAGNOSIS — I48.91 ATRIAL FIBRILLATION, UNSPECIFIED TYPE (HCC): Primary | ICD-10-CM

## 2021-10-28 PROCEDURE — 99214 OFFICE O/P EST MOD 30 MIN: CPT | Performed by: INTERNAL MEDICINE

## 2021-10-28 PROCEDURE — 99213 OFFICE O/P EST LOW 20 MIN: CPT | Performed by: INTERNAL MEDICINE

## 2021-10-28 PROCEDURE — 93005 ELECTROCARDIOGRAM TRACING: CPT | Performed by: INTERNAL MEDICINE

## 2021-10-28 PROCEDURE — 4040F PNEUMOC VAC/ADMIN/RCVD: CPT | Performed by: INTERNAL MEDICINE

## 2021-10-28 PROCEDURE — G8484 FLU IMMUNIZE NO ADMIN: HCPCS | Performed by: INTERNAL MEDICINE

## 2021-10-28 PROCEDURE — 1090F PRES/ABSN URINE INCON ASSESS: CPT | Performed by: INTERNAL MEDICINE

## 2021-10-28 PROCEDURE — G8399 PT W/DXA RESULTS DOCUMENT: HCPCS | Performed by: INTERNAL MEDICINE

## 2021-10-28 PROCEDURE — 1036F TOBACCO NON-USER: CPT | Performed by: INTERNAL MEDICINE

## 2021-10-28 PROCEDURE — 1123F ACP DISCUSS/DSCN MKR DOCD: CPT | Performed by: INTERNAL MEDICINE

## 2021-10-28 PROCEDURE — G8417 CALC BMI ABV UP PARAM F/U: HCPCS | Performed by: INTERNAL MEDICINE

## 2021-10-28 PROCEDURE — 93010 ELECTROCARDIOGRAM REPORT: CPT | Performed by: INTERNAL MEDICINE

## 2021-10-28 PROCEDURE — G8427 DOCREV CUR MEDS BY ELIG CLIN: HCPCS | Performed by: INTERNAL MEDICINE

## 2021-10-28 NOTE — PROGRESS NOTES
Today's Date: 10/28/2021  Patient's Name: [place]  Patient's age: 79 y. o., 1940    CC: follow up for PAF   HPI:  [place]  is here for 3 months follow-up. She remains in atrial fibrillation with rate better controlled. Denies any palpitations. No chest pain or dyspnea. Does report dizziness and lightheadedness on standing up. Her blood pressure has been running on the lower side. Has an upcoming appointment with electrophysiologist at [facility] clinic next week. Past Medical History:   has a past medical history of Arthritis, Cervical spine degeneration, Chronic renal insufficiency, stage II (mild), Colon polyps, Constipation, Cystocele, Diverticulitis of colon, Dyspepsia, GERD (gastroesophageal reflux disease), GI bleed, Head ache, Hyperlipidemia, Hypertension, Multiple melanocytic nevi, Mumps, Over weight, Panic attacks, Paroxysmal atrial fibrillation ([address] Positive cardiac stress test, Shingles, and Tachycardia. Past Surgical History:   has a past surgical history that includes back surgery (1985, 1986); Colonoscopy (2006, 2009); colectomy (10/2006); Uterine fibroid embolization (10/2006); bladder repair (05/06/2009); Colonoscopy (2016); Colonoscopy (03/2019); Cardiac catheterization (11/28/2000); Cardiac catheterization (03/19/2021); transesophageal echocardiogram (06/11/2021); Cardioversion (06/11/2021); pr biopsy of breast, incisional (Right, 1994); pr punc/aspir breast cyst (Right); Breast surgery (Right, 1994); and [facility] STEREO BREAST BX W LOC DEVICE 1ST LESION RIGHT (Right, 7/12/2021). Home Medications:  Prior to Admission medications    Medication Sig Start Date End Date Taking?  Authorizing Provider   metoprolol tartrate (LOPRESSOR) 25 MG tablet TAKE 1 TABLET BY MOUTH TWICE A DAY 10/19/21  Yes Blaise Gottlieb, DO   amiodarone (CORDARONE) 200 MG tablet Take 1 tablet by mouth daily 10/19/21  Yes Blaise Gottlieb, DO   furosemide (LASIX) 20 MG tablet Take 1 tablet by mouth daily 10/19/21  Yes Blaise Gottlieb DO   apixaban (ELIQUIS) 2.5 MG TABS tablet Take 1 tablet by mouth 2 times daily 10/19/21  Yes Blaise Gottlieb DO   simvastatin (ZOCOR) 20 MG tablet TAKE 1 TABLET EVERY EVENING 10/19/21  Yes Blaise Gottlieb DO   amLODIPine (NORVASC) 5 MG tablet take 1 tablet by mouth once daily 10/19/21  Yes Blaise Gottlieb DO   tamoxifen (NOLVADEX) 20 MG tablet Take 20 mg by mouth daily   Yes Historical Provider, MD   acetaminophen (TYLENOL) 325 MG tablet Take 650 mg by mouth every 6 hours as needed for Pain   Yes Historical Provider, MD   Cholecalciferol (VITAMIN D3) 1000 units CAPS Take by mouth Doubles her dose 3 days a week. On M-W-F   Yes Historical Provider, MD   Cyanocobalamin (B-12) 1000 MCG CAPS Take 1 tablet by mouth daily Indications: High Binding Capacity of Iron to Red Blood Cells    Yes Historical Provider, MD   polyethylene glycol (GLYCOLAX) powder Take 17 g by mouth daily  Patient not taking: Reported on 10/28/2021    Historical Provider, MD       Allergies:  Adhesive tape, Codeine, Morphine, and Other    Social History:   reports that she has never smoked. She has never used smokeless tobacco. She reports that she does not drink alcohol and does not use drugs. Family History: family history includes Emphysema in her father; Heart Disease in her sister; High Blood Pressure in her mother; Osteoporosis in her sister. No h/o sudden cardiac death. No for premature CAD      REVIEW OF SYSTEMS:    · Constitutional: there has been mild decline in functional capacity due to orthostatic symptoms  · Eyes: No visual changes or diplopia. No scleral icterus. · ENT: No Headaches, hearing loss or vertigo. No mouth sores or sore throat. · Cardiovascular: see above  · Respiratory: see above  · Gastrointestinal: No abdominal pain, appetite loss, blood in stools. · Genitourinary: No dysuria, trouble voiding, or hematuria.   · Musculoskeletal:  No gait disturbance, No weakness or joint complaints. · Integumentary: No rash or pruritis. · Neurological: No headache or diplopia. No tingling  · Psychiatric: No anxiety, or depression. · Endocrine: No temperature intolerance. · Hematologic/Lymphatic: No abnormal bruising or bleeding, blood clots or swollen lymph nodes. · Allergic/Immunologic: No nasal congestion or hives. PHYSICAL EXAM:      Vitals:    10/28/21 1108   BP: 104/60   Pulse: 95     Constitutional and General Appearance: alert, cooperative, no distress and appears stated age  HEENT: PERRL, no cervical lymphadenopathy. No masses palpable. Normal oral mucosa  Respiratory:  · Normal excursion and expansion without use of accessory muscles  · Resp Auscultation: Good respiratory effort. No for increased work of breathing. On auscultation: clear to auscultation bilaterally  Cardiovascular:  · Heart tones are irregularly irregular  · Jugular venous pulsation Normal  · The carotid upstroke is normal in amplitude and contour without delay or bruit   Abdomen:   · soft  · Bowel sounds present  Extremities:  ·  No edema  Neurological:  · Alert and oriented. Cardiac Data:  EKG 10/28/2021: atrial fibrillation, heart rate 80-85 at rest, nonspecific ST-T wave abnormality diffuse, same as before. Labs:   LABS  Lab Results   Component Value Date    CHOL 164 07/20/2020    TRIG 98 07/20/2020    HDL 70 07/20/2020    LDLCHOLESTEROL 74 07/20/2020    VLDL NOT REPORTED 07/20/2020    CHOLHDLRATIO 2.3 07/20/2020       Lab Results   Component Value Date     07/26/2021    K 3.8 07/26/2021     07/26/2021    CO2 28 07/26/2021    BUN 16 07/26/2021    CREATININE 1.50 (H) 07/26/2021    GLUCOSE 119 (H) 07/26/2021    CALCIUM 9.8 07/26/2021    PROT 7.0 07/20/2020    LABALBU 4.4 07/20/2020    BILITOT 0.63 07/20/2020    ALKPHOS 68 07/20/2020    AST 14 07/20/2020    ALT 10 07/20/2020    LABGLOM 33 (L) 07/26/2021    GFRAA 40 (L) 07/26/2021       Nuclear stress test 6/11/19  1.  Very small area with

## 2021-11-04 ENCOUNTER — TELEPHONE (OUTPATIENT)
Dept: CARDIOLOGY | Age: 81
End: 2021-11-04

## 2021-11-04 DIAGNOSIS — I48.91 ATRIAL FIBRILLATION, UNSPECIFIED TYPE (HCC): Primary | ICD-10-CM

## 2021-11-04 NOTE — TELEPHONE ENCOUNTER
Hannah Morris MD    [facility] Cardiology  [address] [facility]  [phone]    [facility]  [address]  [phone]

## 2021-11-04 NOTE — TELEPHONE ENCOUNTER
Daughter called back. She has phone number to Dr Elena Coyle at [facility] in [facility] whom they would like pt to see. I called and got the fax number. It is [phone]. Phone number is [phone]. [facility]  states we should fax a referral and all records to this number and write Please call pt.

## 2021-11-04 NOTE — TELEPHONE ENCOUNTER
Pt's daughter calling regarding EP referral to [facility]. Pt decided she did not want to drive to [place] now and would prefer [facility]. Daughter will call [facility] and make appt, she will call our office with info of where to send referral order, demos and cardiac records.

## 2021-12-07 ENCOUNTER — OFFICE VISIT (OUTPATIENT)
Dept: NEPHROLOGY | Age: 81
End: 2021-12-07
Payer: MEDICARE

## 2021-12-07 VITALS
DIASTOLIC BLOOD PRESSURE: 76 MMHG | BODY MASS INDEX: 35.7 KG/M2 | WEIGHT: 194 LBS | HEART RATE: 88 BPM | HEIGHT: 62 IN | SYSTOLIC BLOOD PRESSURE: 120 MMHG

## 2021-12-07 DIAGNOSIS — I10 ESSENTIAL HYPERTENSION: ICD-10-CM

## 2021-12-07 DIAGNOSIS — N18.32 STAGE 3B CHRONIC KIDNEY DISEASE (HCC): Primary | ICD-10-CM

## 2021-12-07 PROCEDURE — G8399 PT W/DXA RESULTS DOCUMENT: HCPCS | Performed by: INTERNAL MEDICINE

## 2021-12-07 PROCEDURE — 1036F TOBACCO NON-USER: CPT | Performed by: INTERNAL MEDICINE

## 2021-12-07 PROCEDURE — 4040F PNEUMOC VAC/ADMIN/RCVD: CPT | Performed by: INTERNAL MEDICINE

## 2021-12-07 PROCEDURE — G8484 FLU IMMUNIZE NO ADMIN: HCPCS | Performed by: INTERNAL MEDICINE

## 2021-12-07 PROCEDURE — 99213 OFFICE O/P EST LOW 20 MIN: CPT | Performed by: INTERNAL MEDICINE

## 2021-12-07 PROCEDURE — 1090F PRES/ABSN URINE INCON ASSESS: CPT | Performed by: INTERNAL MEDICINE

## 2021-12-07 PROCEDURE — 99204 OFFICE O/P NEW MOD 45 MIN: CPT | Performed by: INTERNAL MEDICINE

## 2021-12-07 PROCEDURE — 1123F ACP DISCUSS/DSCN MKR DOCD: CPT | Performed by: INTERNAL MEDICINE

## 2021-12-07 PROCEDURE — G8427 DOCREV CUR MEDS BY ELIG CLIN: HCPCS | Performed by: INTERNAL MEDICINE

## 2021-12-07 PROCEDURE — G8417 CALC BMI ABV UP PARAM F/U: HCPCS | Performed by: INTERNAL MEDICINE

## 2021-12-07 NOTE — PROGRESS NOTES
6/11/2021 showed LVEF 50%, mild to moderate MR, moderate AI no valvular vegetations. No history of recent contrast exposure, No h/o prolonged NSAIDs use in the past and she does occasionally use Tylenol if needed, No h/o nephrolithiasis, No recent skin rashes or arthralgias, No hematuria or pyuria noticed in the recent past. Doesn't report any reduction in the urine output recently. Non report of any obstructive urinary symptoms (urgency, frequency, weak stream, straining while urination). No h/o recurrent UTIs in the past.  She denies any not urea. She does get edema at times and has been on Lasix daily. Past History/Allergies? Social History:     Past Medical History:   Diagnosis Date    Arthritis     degenerative    Cervical spine degeneration     Chronic renal insufficiency, stage II (mild)     Colon polyps     history of     Constipation     Cystocele     Diverticulitis of colon     history of     Dyspepsia     history of     GERD (gastroesophageal reflux disease)     GI bleed Jan 2016    Head ache     Hyperlipidemia     Hypertension     Multiple melanocytic nevi     Mumps     Over weight     Panic attacks     history of     Paroxysmal atrial fibrillation (HCC)     Positive cardiac stress test 03/2021    Shingles     history of     Tachycardia     episodic       Allergies   Allergen Reactions    Adhesive Tape      rash    Codeine Other (See Comments)     nightmares    Morphine Other (See Comments)     Nightmares      Other      Bee stings - swelling       Social History     Socioeconomic History    Marital status:      Spouse name: Not on file    Number of children: Not on file    Years of education: Not on file    Highest education level: Not on file   Occupational History    Not on file   Tobacco Use    Smoking status: Never Smoker    Smokeless tobacco: Never Used    Tobacco comment: shivani rrt 7/15/17   Vaping Use    Vaping Use: Never used   Substance and Sexual Activity    Alcohol use: No    Drug use: No    Sexual activity: Yes     Partners: Male   Other Topics Concern    Not on file   Social History Narrative    Not on file     Social Determinants of Health     Financial Resource Strain: Low Risk     Difficulty of Paying Living Expenses: Not hard at all   Food Insecurity: No Food Insecurity    Worried About Running Out of Food in the Last Year: Never true    [address] in the Last Year: Never true   Transportation Needs:     Lack of Transportation (Medical): Not on file    Lack of Transportation (Non-Medical): Not on file   Physical Activity:     Days of Exercise per Week: Not on file    Minutes of Exercise per Session: Not on file   Stress:     Feeling of Stress : Not on file   Social Connections:     Frequency of Communication with Friends and Family: Not on file    Frequency of Social Gatherings with Friends and Family: Not on file    Attends [employer] Services: Not on file    Active Member of [address] [place] [address] or Organizations: Not on file    Attends Club or Organization Meetings: Not on file    Marital Status: Not on file   Intimate Partner Violence:     Fear of Current or Ex-Partner: Not on file    Emotionally Abused: Not on file    Physically Abused: Not on file    Sexually Abused: Not on file   Housing Stability:     Unable to Pay for Housing in the Last Year: Not on file    Number of [place] in the Last Year: Not on file    Unstable Housing in the Last Year: Not on file       Family History:        Family History   Problem Relation Age of Onset    High Blood Pressure Mother     Emphysema Father     Osteoporosis Sister     Heart Disease Sister         CHF       Outpatient Medications:     Not in a hospital admission. Review of Systems:     Constitutional: No fever, no chills, no lethargy, no weakness.   No history of paraprotein disease, leukemia or lymphoma  HEENT:  No headache, otalgia, itchy eyes, nasal discharge or sore throat. Cardiac:  No chest pain, dyspnea, orthopnea or PND. Chest:   No cough, phlegm or wheezing. Abdomen:  No abdominal pain, nausea or vomiting. Neuro:  No focal weakness, abnormal movements orseizure like activity. Skin:   No rashes, no itching. No history of vasculitides  :   No hematuria, no pyuria, no dysuria, no flank pain. Extremities:  No swelling and no history of connective tissue disease  ROS was otherwise negative except as mentioned in the [address] Vital Signs:     Vitals:    12/07/21 0908   BP: 120/76   Pulse: 88     Wt Readings from Last 2 Encounters:   12/07/21 194 lb (88 kg)   10/28/21 192 lb (87.1 kg)       Physical Examination:     General:  AAO x 3, speaking in full sentences, no accessory muscle use. HEENT: Atraumatic, normocephalic, no throat congestion, moist mucosa. Eyes:   Normal conjunctiva, EOMI, no scleral icterus  Neck:   No JVD, midline trachea and no accessory muscle use  Chest:   Bilateral vesicular breath sounds, no rales or wheezes. Cardiac:  Irregularly irregular rhythm with no rubs, positive murmurs  Abdomen: Soft, non-tender, no masses or organomegaly, BS audible. :   No flank tenderness. Neuro:  AAO x 3   SKIN:  No rashes, good skin turgor. Extremities:  No edema, palpable peripheral pulses, no calf tenderness. Labs:     No results for input(s): WBC, RBC, HGB, HCT, MCV, MCH, MCHC, RDW, PLT, MPV in the last 72 hours. BMP: No results for input(s): NA, K, CL, CO2, BUN, CREATININE, GLUCOSE, CALCIUM in the last 72 hours. Phosphorus:   No results for input(s): PHOS in the last 72 hours. Magnesium:  No results for input(s): MG in the last 72 hours. Albumin:  No results for input(s): LABALBU in the last 72 hours.   BNP:    No results found for: BNP  ELIO:      Lab Results   Component Value Date    ELIO NEGATIVE 01/07/2020     SPEP:  Lab Results   Component Value Date    PROT 7.0 07/20/2020     UPEP:   No results found for: LABPE  C3:   No results found for: C3  C4: No results found for: C4  MPO ANCA:   No results found for: MPO  PR3 ANCA:   No results found for: PR3  Anti-GBM:   No results found for: GBMABIGG  Hep BsAg:       No results found for: HEPBSAG  Hep C AB:        No results found for: HEPCAB     Labs in July 2021 showed CBC normal with hemoglobin 13.8, white blood cells 5.8 and platelets 855. BMP showed BUN 16 with creatinine 1.5 calcium 9.8 sodium 143 potassium 3.8 chloride 104, CO2 28 and estimated GFR of 33 mL/min. Labs are actually worse back in July 2020. Urinalysis/Chemistries:      Lab Results   Component Value Date    NITRU NEGATIVE 06/14/2019    COLORU NOT REPORTED 06/14/2019    PHUR 6.0 06/14/2019    WBCUA None 06/14/2019    RBCUA None 06/14/2019    MUCUS NOT REPORTED 06/14/2019    TRICHOMONAS NOT REPORTED 06/14/2019    YEAST NOT REPORTED 06/14/2019    BACTERIA TRACE 06/14/2019    SPECGRAV 1.010 06/14/2019    LEUKOCYTESUR NEGATIVE 06/14/2019    UROBILINOGEN Normal 06/14/2019    BILIRUBINUR NEGATIVE 06/14/2019    GLUCOSEU NEGATIVE 06/14/2019    KETUA NEGATIVE 06/14/2019    AMORPHOUS NOT REPORTED 06/14/2019     Urine Sodium:   No results found for: MARIA ELENA  Urine Potassium:  No results found for: KUR  Urine Chloride:  No results found for: CLUR  Urine Osmolarity: No results found for: OSMOU  Urine Protein:   No results found for: TPU  Urine Creatinine:   No results found for: LABCREA  UPC:     Urine Eosinophils:  No components found for: UEOS    Radiology:     CXR:     Assessment:     1. Chronic kidney disease stage IIIb, likely secondary to hypertension and nephrosclerosis although other etiologies such as paraprotein disease, connective tissue disease, vasculitides and glomerulonephritis need to be evaluated for  2.   Essential hypertension with good control with the patient actually going off amlodipine within the past couple of months because of hypotension  3.  Relatively recent diagnosis of breast cancer status post partial mastectomy and has been on tamoxifen for a couple of months  4. History of paroxysmal atrial fibrillation which now appears to be persistent with failed cardioversion. She says she developed atrial fibrillation in about 2002. She was not on oral anticoagulation until recently. She is on Eliquis. She said she was on propafenone for a while which did help with the atrial fibrillation. 5.  History of aortic insufficiency and moderate mitral valve regurgitation and a degree of cardiomyopathy with LVEF of 50% with the patient denying any history of critical coronary artery disease on recent cardiac catheterization  6. History of edema that is controlled with Lasix. Also, being off amlodipine likely will help. 7.  History of hypovitaminosis D  8. Normal CBC on last check    Plan:   1. Check ELIO, ANCA, C3, C4, serum free light chain with ratio  2. Check CMP with bilirubin, phosphorus, intact PTH, uric acid and 25 vitamin D level  3. Will Check Renal Ultrasound to evaluate for element of obstruction and to assess the kidney size/echotexture. 4.  Urinalysis with microscopy along with a random urine protein to creatinine ratio  5. Return to see me in the office in about 1 month for completion of her evaluation  6. No medication changes today  7. Continue to avoid NSAIDs and other nephrotoxic agents      Romario Gallardo.  Selena Villatoro MD  [facility]  12/7/2021

## 2021-12-13 ENCOUNTER — HOSPITAL ENCOUNTER (OUTPATIENT)
Dept: INTERVENTIONAL RADIOLOGY/VASCULAR | Age: 81
Discharge: HOME | End: 2021-12-15
Payer: MEDICARE

## 2021-12-13 ENCOUNTER — HOSPITAL ENCOUNTER (OUTPATIENT)
Dept: LAB | Age: 81
Discharge: HOME | End: 2021-12-13
Payer: MEDICARE

## 2021-12-13 DIAGNOSIS — I10 ESSENTIAL HYPERTENSION: ICD-10-CM

## 2021-12-13 DIAGNOSIS — N18.32 STAGE 3B CHRONIC KIDNEY DISEASE (HCC): ICD-10-CM

## 2021-12-13 LAB
-: ABNORMAL
ALBUMIN SERPL-MCNC: 4 G/DL (ref 3.5–5.2)
ALBUMIN/GLOBULIN RATIO: 1.7 (ref 1–2.5)
ALP BLD-CCNC: 63 U/L (ref 35–104)
ALT SERPL-CCNC: 12 U/L (ref 5–33)
AMORPHOUS: ABNORMAL
ANION GAP SERPL CALCULATED.3IONS-SCNC: 10 MMOL/L (ref 9–17)
AST SERPL-CCNC: 16 U/L
BACTERIA: ABNORMAL
BILIRUB SERPL-MCNC: 0.68 MG/DL (ref 0.3–1.2)
BILIRUBIN DIRECT: 0.16 MG/DL
BILIRUBIN URINE: NEGATIVE
BILIRUBIN, INDIRECT: 0.52 MG/DL (ref 0–1)
BUN BLDV-MCNC: 18 MG/DL (ref 8–23)
CALCIUM SERPL-MCNC: 9.4 MG/DL (ref 8.6–10.4)
CASTS UA: ABNORMAL /LPF (ref 0–2)
CHLORIDE BLD-SCNC: 104 MMOL/L (ref 98–107)
CO2: 27 MMOL/L (ref 20–31)
COLOR: ABNORMAL
COMMENT UA: ABNORMAL
COMPLEMENT C3: 118 MG/DL (ref 90–180)
COMPLEMENT C4: 32 MG/DL (ref 10–40)
CREAT SERPL-MCNC: 1.4 MG/DL (ref 0.5–0.9)
CREATININE URINE: 106.4 MG/DL (ref 28–217)
CRYSTALS, UA: ABNORMAL /HPF
EPITHELIAL CELLS UA: ABNORMAL /HPF (ref 0–5)
FREE KAPPA/LAMBDA RATIO: 6.69 (ref 0.26–1.65)
GFR AFRICAN AMERICAN: 44 ML/MIN
GFR NON-AFRICAN AMERICAN: 36 ML/MIN
GFR SERPL CREATININE-BSD FRML MDRD: ABNORMAL ML/MIN/{1.73_M2}
GFR SERPL CREATININE-BSD FRML MDRD: ABNORMAL ML/MIN/{1.73_M2}
GLUCOSE BLD-MCNC: 97 MG/DL (ref 70–99)
GLUCOSE URINE: NEGATIVE
KAPPA FREE LIGHT CHAINS QNT: 7.63 MG/DL (ref 0.37–1.94)
KETONES, URINE: NEGATIVE
LAMBDA FREE LIGHT CHAINS QNT: 1.14 MG/DL (ref 0.57–2.63)
LEUKOCYTE ESTERASE, URINE: ABNORMAL
MUCUS: ABNORMAL
NITRITE, URINE: NEGATIVE
OTHER OBSERVATIONS UA: ABNORMAL
PH UA: 6 (ref 5–6)
PHOSPHORUS: 2.5 MG/DL (ref 2.6–4.5)
POTASSIUM SERPL-SCNC: 4.1 MMOL/L (ref 3.7–5.3)
PROTEIN UA: NEGATIVE
PTH INTACT: 112.2 PG/ML (ref 15–65)
RBC UA: ABNORMAL /HPF (ref 0–4)
RENAL EPITHELIAL, UA: ABNORMAL /HPF
SODIUM BLD-SCNC: 141 MMOL/L (ref 135–144)
SPECIFIC GRAVITY UA: 1 (ref 1.01–1.02)
TOTAL PROTEIN, URINE: 12 MG/DL
TOTAL PROTEIN: 6.4 G/DL (ref 6.4–8.3)
TRICHOMONAS: ABNORMAL
TURBIDITY: ABNORMAL
URIC ACID: 6 MG/DL (ref 2.4–5.7)
URINE HGB: NEGATIVE
UROBILINOGEN, URINE: NORMAL
VITAMIN D 25-HYDROXY: 42.3 NG/ML (ref 30–100)
WBC UA: ABNORMAL /HPF (ref 0–4)
YEAST: ABNORMAL

## 2021-12-13 PROCEDURE — 76770 US EXAM ABDO BACK WALL COMP: CPT

## 2021-12-13 PROCEDURE — 84550 ASSAY OF BLOOD/URIC ACID: CPT

## 2021-12-13 PROCEDURE — 80053 COMPREHEN METABOLIC PANEL: CPT

## 2021-12-13 PROCEDURE — 83883 ASSAY NEPHELOMETRY NOT SPEC: CPT

## 2021-12-13 PROCEDURE — 83516 IMMUNOASSAY NONANTIBODY: CPT

## 2021-12-13 PROCEDURE — 82570 ASSAY OF URINE CREATININE: CPT

## 2021-12-13 PROCEDURE — 86038 ANTINUCLEAR ANTIBODIES: CPT

## 2021-12-13 PROCEDURE — 84156 ASSAY OF PROTEIN URINE: CPT

## 2021-12-13 PROCEDURE — 82306 VITAMIN D 25 HYDROXY: CPT

## 2021-12-13 PROCEDURE — 86225 DNA ANTIBODY NATIVE: CPT

## 2021-12-13 PROCEDURE — 82248 BILIRUBIN DIRECT: CPT

## 2021-12-13 PROCEDURE — 81001 URINALYSIS AUTO W/SCOPE: CPT

## 2021-12-13 PROCEDURE — 84100 ASSAY OF PHOSPHORUS: CPT

## 2021-12-13 PROCEDURE — 86160 COMPLEMENT ANTIGEN: CPT

## 2021-12-13 PROCEDURE — 36415 COLL VENOUS BLD VENIPUNCTURE: CPT

## 2021-12-13 PROCEDURE — 83970 ASSAY OF PARATHORMONE: CPT

## 2021-12-14 LAB
ANCA MYELOPEROXIDASE: <0.3 AU/ML (ref 0–3.5)
ANCA PROTEINASE 3: <0.7 AU/ML (ref 0–2)
ANTI DNA DOUBLE STRANDED: <0.5 IU/ML
ANTI-NUCLEAR ANTIBODY (ANA): NEGATIVE
ENA ANTIBODIES SCREEN: 0.2 U/ML

## 2022-01-18 ENCOUNTER — OFFICE VISIT (OUTPATIENT)
Dept: NEPHROLOGY | Age: 82
End: 2022-01-18
Payer: MEDICARE

## 2022-01-18 ENCOUNTER — HOSPITAL ENCOUNTER (OUTPATIENT)
Dept: LAB | Age: 82
Discharge: HOME | End: 2022-01-18
Payer: MEDICARE

## 2022-01-18 VITALS
DIASTOLIC BLOOD PRESSURE: 70 MMHG | BODY MASS INDEX: 35.51 KG/M2 | SYSTOLIC BLOOD PRESSURE: 122 MMHG | WEIGHT: 193 LBS | HEART RATE: 84 BPM | HEIGHT: 62 IN

## 2022-01-18 DIAGNOSIS — I10 ESSENTIAL HYPERTENSION: ICD-10-CM

## 2022-01-18 DIAGNOSIS — N25.81 SECONDARY HYPERPARATHYROIDISM (HCC): ICD-10-CM

## 2022-01-18 DIAGNOSIS — R76.8 ELEVATED SERUM IMMUNOGLOBULIN FREE LIGHT CHAIN LEVEL: ICD-10-CM

## 2022-01-18 DIAGNOSIS — N18.32 CHRONIC KIDNEY DISEASE, STAGE 3B (HCC): ICD-10-CM

## 2022-01-18 DIAGNOSIS — N18.32 CHRONIC KIDNEY DISEASE, STAGE 3B (HCC): Primary | ICD-10-CM

## 2022-01-18 PROCEDURE — 1090F PRES/ABSN URINE INCON ASSESS: CPT | Performed by: INTERNAL MEDICINE

## 2022-01-18 PROCEDURE — G8484 FLU IMMUNIZE NO ADMIN: HCPCS | Performed by: INTERNAL MEDICINE

## 2022-01-18 PROCEDURE — G8427 DOCREV CUR MEDS BY ELIG CLIN: HCPCS | Performed by: INTERNAL MEDICINE

## 2022-01-18 PROCEDURE — G8417 CALC BMI ABV UP PARAM F/U: HCPCS | Performed by: INTERNAL MEDICINE

## 2022-01-18 PROCEDURE — 4040F PNEUMOC VAC/ADMIN/RCVD: CPT | Performed by: INTERNAL MEDICINE

## 2022-01-18 PROCEDURE — 84165 PROTEIN E-PHORESIS SERUM: CPT

## 2022-01-18 PROCEDURE — 36415 COLL VENOUS BLD VENIPUNCTURE: CPT

## 2022-01-18 PROCEDURE — G8399 PT W/DXA RESULTS DOCUMENT: HCPCS | Performed by: INTERNAL MEDICINE

## 2022-01-18 PROCEDURE — 1036F TOBACCO NON-USER: CPT | Performed by: INTERNAL MEDICINE

## 2022-01-18 PROCEDURE — 84155 ASSAY OF PROTEIN SERUM: CPT

## 2022-01-18 PROCEDURE — 99213 OFFICE O/P EST LOW 20 MIN: CPT | Performed by: INTERNAL MEDICINE

## 2022-01-18 PROCEDURE — 1123F ACP DISCUSS/DSCN MKR DOCD: CPT | Performed by: INTERNAL MEDICINE

## 2022-01-18 PROCEDURE — 86334 IMMUNOFIX E-PHORESIS SERUM: CPT

## 2022-01-18 NOTE — PROGRESS NOTES
Renal Progress Note    Patient :  Wes Benoit; 79 y.o. MRN# [id number]    Reason for Consult:     Asked by Nena Curtis NP to see for KELLEY/Elevated Creatinine. History of Present Illness:     Wes Benoit; 79 y.o. female with past medical history as mentioned below. She comes back in 1 month return visit after initial evaluation for chronic kidney disease. The only significant abnormality was an elevated serum free light chain ratio. Interestingly, she has a normal random urine protein to creatinine ratio, normal albumin, normal albumin to globulin ratio and a previously normal CBC done in July 2021. Renal ultrasound shows smallish kidneys but no other significant abnormalities. She has mild secondary hyperparathyroidism with intact PTH level of 112, not requiring any treatment. We will order serum protein immunofixation. Review of labs drawn from December 2021 are documented in the lab section below. She has history of essential hypertension for many years, degenerative arthritis, known history of chronic kidney disease stage IIIb for at least a couple of years, likely more, constipation, colonic polyps, paroxysmal atrial fibrillation although now it appears to be persistent as the patient had a relatively recent cardioversion that did not get her out of A. fib. There is some plans apparently for possible ablation. She does also have cardiomyopathy with LVEF of 50% with moderate MR and moderate AI from a JOANNA from June 2021. She said she did have a cardiac catheterization in the summer 2021 that did not show any critical CAD to her knowledge and she did not require any stents. She also has a history of cystocele, diverticulitis, GI bleed, mumps, panic attacks, shingles, tachycardia. She also recently had a partial mastectomy for breast cancer and is on tamoxifen. She did not receive radiation or other chemotherapy.   Apparently, the cancer was contained, according to the patient. Her mother  of kidney failure but did not have dialysis. She says her sister has some kidney disease but not on dialysis. No history of dialysis or transplant in the family. Family history of heart disease, emphysema, osteoporosis and hypertension. Allergies to codeine, adhesive tape, morphine and bee stings. Patient is . She is a never smoker. Medications are reviewed and include Lopressor, amiodarone, Lasix 20 mg daily, Eliquis, Zocor, tamoxifen, vitamin D3, B12 and GlycoLax. She used to be on amlodipine but that was discontinued because of hypotension. Was discontinued within the last couple of months. JOANNA on 2021 showed LVEF 50%, mild to moderate MR, moderate AI no valvular vegetations. No history of recent contrast exposure, No h/o prolonged NSAIDs use in the past and she does occasionally use Tylenol if needed, No h/o nephrolithiasis, No recent skin rashes or arthralgias, No hematuria or pyuria noticed in the recent past. Doesn't report any reduction in the urine output recently. Non report of any obstructive urinary symptoms (urgency, frequency, weak stream, straining while urination). No h/o recurrent UTIs in the past.  She denies any not urea. She does get edema at times and has been on Lasix daily. Past History/Allergies? Social History:     Past Medical History:   Diagnosis Date    Arthritis     degenerative    Cervical spine degeneration     Chronic renal insufficiency, stage II (mild)     Colon polyps     history of     Constipation     Cystocele     Diverticulitis of colon     history of     Dyspepsia     history of     GERD (gastroesophageal reflux disease)     GI bleed 2016    Head ache     Hyperlipidemia     Hypertension     Multiple melanocytic nevi     Mumps     Over weight     Panic attacks     history of     Paroxysmal atrial fibrillation (HCC)     Positive cardiac stress test 2021    Shingles     history of     Tachycardia     episodic       Allergies   Allergen Reactions    Adhesive Tape      rash    Codeine Other (See Comments)     nightmares    Morphine Other (See Comments)     Nightmares      Other      Bee stings - swelling       Social History     Socioeconomic History    Marital status:      Spouse name: Not on file    Number of children: Not on file    Years of education: Not on file    Highest education level: Not on file   Occupational History    Not on file   Tobacco Use    Smoking status: Never Smoker    Smokeless tobacco: Never Used    Tobacco comment: aneudyrosa rrt 7/15/17   Vaping Use    Vaping Use: Never used   Substance and Sexual Activity    Alcohol use: No    Drug use: No    Sexual activity: Yes     Partners: Male   Other Topics Concern    Not on file   Social History Narrative    Not on file     Social Determinants of Health     Financial Resource Strain: Low Risk     Difficulty of Paying Living Expenses: Not hard at all   Food Insecurity: No Food Insecurity    Worried About Running Out of Food in the Last Year: Never true    Kathi of Food in the Last Year: Never true   Transportation Needs:     Lack of Transportation (Medical): Not on file    Lack of Transportation (Non-Medical):  Not on file   Physical Activity:     Days of Exercise per Week: Not on file    Minutes of Exercise per Session: Not on file   Stress:     Feeling of Stress : Not on file   Social Connections:     Frequency of Communication with Friends and Family: Not on file    Frequency of Social Gatherings with Friends and Family: Not on file    Attends Jain Services: Not on file    Active Member of Clubs or Organizations: Not on file    Attends Club or Organization Meetings: Not on file    Marital Status: Not on file   Intimate Partner Violence:     Fear of Current or Ex-Partner: Not on file    Emotionally Abused: Not on file    Physically Abused: Not on file    Sexually Abused: Not on file Housing Stability:     Unable to Pay for Housing in the Last Year: Not on file    Number of Places Lived in the Last Year: Not on file    Unstable Housing in the Last Year: Not on file       Family History:        Family History   Problem Relation Age of Onset    High Blood Pressure Mother     Emphysema Father     Osteoporosis Sister     Heart Disease Sister         CHF       Outpatient Medications:     Not in a hospital admission. Review of Systems:     Constitutional: No fever, no chills, no lethargy, no weakness. No history of paraprotein disease, leukemia or lymphoma although she does have an elevated serum free light chain ratio on serologies just drawn in December 2021  HEENT:  No headache, otalgia, itchy eyes, nasal discharge or sore throat. Cardiac:  No chest pain, dyspnea, orthopnea or PND. Chest:   No cough, phlegm or wheezing. Abdomen:  No abdominal pain, nausea or vomiting. Neuro:  No focal weakness, abnormal movements orseizure like activity. Skin:   No rashes, no itching. No history of vasculitides  :   No hematuria, no pyuria, no dysuria, no flank pain. Extremities:  No swelling and no history of connective tissue disease  ROS was otherwise negative except as mentioned in the [address] Vital Signs:     Vitals:    01/18/22 0854   BP: 122/70   Pulse: 84     Wt Readings from Last 2 Encounters:   01/18/22 193 lb (87.5 kg)   12/07/21 194 lb (88 kg)       Physical Examination:     General:  AAO x 3, speaking in full sentences, no accessory muscle use. HEENT: Atraumatic, normocephalic, no throat congestion, moist mucosa. Eyes:   Normal conjunctiva, EOMI, no scleral icterus  Neck:   No JVD, midline trachea and no accessory muscle use  Chest:   Bilateral vesicular breath sounds, no rales or wheezes. Cardiac:  Irregularly irregular rhythm with no rubs, positive murmurs  Abdomen: Soft, non-tender, no masses or organomegaly, BS audible. :   No flank tenderness.   Neuro:  AAO x 3 SKIN:  No rashes, good skin turgor. Extremities:  No edema, palpable peripheral pulses, no calf tenderness. Labs:     No results for input(s): WBC, RBC, HGB, HCT, MCV, MCH, MCHC, RDW, PLT, MPV in the last 72 hours. BMP: No results for input(s): NA, K, CL, CO2, BUN, CREATININE, GLUCOSE, CALCIUM in the last 72 hours. Phosphorus:   No results for input(s): PHOS in the last 72 hours. Magnesium:  No results for input(s): MG in the last 72 hours. Albumin:  No results for input(s): LABALBU in the last 72 hours. BNP:    No results found for: BNP  ELIO:      Lab Results   Component Value Date    ELIO NEGATIVE 12/13/2021     SPEP:  Lab Results   Component Value Date    PROT 6.4 12/13/2021     UPEP:   No results found for: LABPE  C3:     Lab Results   Component Value Date    C3 118 12/13/2021     C4:     Lab Results   Component Value Date    C4 32 12/13/2021     MPO ANCA:     Lab Results   Component Value Date    MPO <0.3 12/13/2021     PR3 ANCA:     Lab Results   Component Value Date    PR3 <0.7 12/13/2021     Anti-GBM:   No results found for: GBMABIGG  Hep BsAg:       No results found for: HEPBSAG  Hep C AB:        No results found for: HEPCAB     Labs in July 2021 showed CBC normal with hemoglobin 13.8, white blood cells 5.8 and platelets 040. BMP showed BUN 16 with creatinine 1.5 calcium 9.8 sodium 143 potassium 3.8 chloride 104, CO2 28 and estimated GFR of 33 mL/min. Labs are actually worse back in July 2020. Labs from 12/13/2021 showed normal ELIO, normal C3, normal C4, ANCA negative, serum free light chain ratio elevated at 6.69, intact PTH level mildly elevated at 112, phosphorus low at 2.5, 25 vitamin D level normal at 42, uric acid 6, CBC not done because CBC in July was normal.  CMP from 12/13/2021 showed albumin normal at 4, normal liver function tests otherwise and albumin to globulin ratio normal at 1.7.   Serum creatinine was 1.4 with BUN 18 similar to previous, sodium 141 with potassium 4.1 chloride 104 and CO2 27 with normal anion gap. Estimated GFR 36 mL/min for chronic kidney disease stage IIIb. Urinalysis/Chemistries:      Lab Results   Component Value Date    NITRU NEGATIVE 12/13/2021    COLORU NOT REPORTED 12/13/2021    PHUR 6.0 12/13/2021    WBCUA 0 TO 4 12/13/2021    RBCUA 0 TO 4 12/13/2021    MUCUS NOT REPORTED 12/13/2021    TRICHOMONAS NOT REPORTED 12/13/2021    YEAST NOT REPORTED 12/13/2021    BACTERIA TRACE 12/13/2021    SPECGRAV 1.005 12/13/2021    LEUKOCYTESUR 1+ 12/13/2021    UROBILINOGEN Normal 12/13/2021    BILIRUBINUR NEGATIVE 12/13/2021    GLUCOSEU NEGATIVE 12/13/2021    KETUA NEGATIVE 12/13/2021    AMORPHOUS NOT REPORTED 12/13/2021     Urine Sodium:   No results found for: MARIA ELENA  Urine Potassium:  No results found for: KUR  Urine Chloride:  No results found for: CLUR  Urine Osmolarity: No results found for: OSMOU  Urine Protein:   No results found for: TPU  Urine Creatinine:     Lab Results   Component Value Date    LABCREA 106.4 12/13/2021     UPC:     Urine Eosinophils:  No components found for: UEOS     Urinalysis showed negative protein by dipstick with remainder of results above. Random urine protein to creatinine ratio showed normal at about 0.1. Radiology:     CXR:     Assessment:     1. Chronic kidney disease stage IIIb, likely secondary to hypertension and nephrosclerosis. Normal random urine for protein to creatinine ratio and normal complements, normal ELIO and normal ANCA. However, serum free light chain ratio was quite elevated despite a previously normal blood count and normal urine protein to creatinine ratio so we will get a serum protein immunofixation. 2.  Essential hypertension with good control with the patient actually going off amlodipine within the past couple of months because of hypotension  3.  Relatively recent diagnosis of breast cancer status post partial mastectomy and has been on tamoxifen for a couple of months  4.   History of paroxysmal

## 2022-01-21 LAB
ALBUMIN (CALCULATED): 4.1 G/DL (ref 3.2–5.2)
ALBUMIN PERCENT: 64 % (ref 45–65)
ALPHA 1 PERCENT: 3 % (ref 3–6)
ALPHA 2 PERCENT: 11 % (ref 6–13)
ALPHA-1-GLOBULIN: 0.2 G/DL (ref 0.1–0.4)
ALPHA-2-GLOBULIN: 0.7 G/DL (ref 0.5–0.9)
BETA GLOBULIN: 0.6 G/DL (ref 0.5–1.1)
BETA PERCENT: 10 % (ref 11–19)
GAMMA GLOBULIN %: 12 % (ref 9–20)
GAMMA GLOBULIN: 0.8 G/DL (ref 0.5–1.5)
PATHOLOGIST: ABNORMAL
PATHOLOGIST: NORMAL
PROTEIN ELECTROPHORESIS, SERUM: ABNORMAL
SERUM IFX INTERP: NORMAL
TOTAL PROT. SUM,%: 100 % (ref 98–102)
TOTAL PROT. SUM: 6.4 G/DL (ref 6.3–8.2)
TOTAL PROTEIN: 6.3 G/DL (ref 6.4–8.3)

## 2022-02-26 ENCOUNTER — APPOINTMENT (OUTPATIENT)
Dept: CT IMAGING | Age: 82
End: 2022-02-26
Payer: MEDICARE

## 2022-02-26 ENCOUNTER — HOSPITAL ENCOUNTER (EMERGENCY)
Age: 82
Discharge: ANOTHER HOSPITAL | End: 2022-02-26
Attending: EMERGENCY MEDICINE
Payer: MEDICARE

## 2022-02-26 VITALS
HEIGHT: 62 IN | WEIGHT: 190 LBS | TEMPERATURE: 97.9 F | SYSTOLIC BLOOD PRESSURE: 137 MMHG | BODY MASS INDEX: 34.96 KG/M2 | DIASTOLIC BLOOD PRESSURE: 64 MMHG | RESPIRATION RATE: 14 BRPM | OXYGEN SATURATION: 97 % | HEART RATE: 60 BPM

## 2022-02-26 DIAGNOSIS — I72.4 PSEUDOANEURYSM OF LEFT FEMORAL ARTERY (HCC): Primary | ICD-10-CM

## 2022-02-26 LAB
ABSOLUTE EOS #: 0.1 K/UL (ref 0–0.44)
ABSOLUTE IMMATURE GRANULOCYTE: <0.03 K/UL (ref 0–0.3)
ABSOLUTE LYMPH #: 1.2 K/UL (ref 1.1–3.7)
ABSOLUTE MONO #: 0.48 K/UL (ref 0.1–1.2)
ANION GAP SERPL CALCULATED.3IONS-SCNC: 8 MMOL/L (ref 9–17)
BASOPHILS # BLD: 1 % (ref 0–2)
BASOPHILS ABSOLUTE: 0.04 K/UL (ref 0–0.2)
BUN BLDV-MCNC: 17 MG/DL (ref 8–23)
BUN/CREAT BLD: 13 (ref 9–20)
CALCIUM SERPL-MCNC: 9.3 MG/DL (ref 8.6–10.4)
CHLORIDE BLD-SCNC: 107 MMOL/L (ref 98–107)
CO2: 26 MMOL/L (ref 20–31)
CREAT SERPL-MCNC: 1.3 MG/DL (ref 0.5–0.9)
EOSINOPHILS RELATIVE PERCENT: 2 % (ref 1–4)
GFR AFRICAN AMERICAN: 48 ML/MIN
GFR NON-AFRICAN AMERICAN: 39 ML/MIN
GFR SERPL CREATININE-BSD FRML MDRD: ABNORMAL ML/MIN/{1.73_M2}
GLUCOSE BLD-MCNC: 122 MG/DL (ref 70–99)
HCT VFR BLD CALC: 34.4 % (ref 36.3–47.1)
HEMOGLOBIN: 11.3 G/DL (ref 11.9–15.1)
IMMATURE GRANULOCYTES: 0 %
INR BLD: 1.5
LYMPHOCYTES # BLD: 20 % (ref 24–43)
MCH RBC QN AUTO: 32.2 PG (ref 25.2–33.5)
MCHC RBC AUTO-ENTMCNC: 32.8 G/DL (ref 25.2–33.5)
MCV RBC AUTO: 98 FL (ref 82.6–102.9)
MONOCYTES # BLD: 8 % (ref 3–12)
NRBC AUTOMATED: 0 PER 100 WBC
PARTIAL THROMBOPLASTIN TIME: 27.3 SEC (ref 23.9–33.8)
PDW BLD-RTO: 13.1 % (ref 11.8–14.4)
PLATELET # BLD: ABNORMAL K/UL (ref 138–453)
PLATELET, FLUORESCENCE: 127 K/UL (ref 138–453)
PLATELET, IMMATURE FRACTION: 9.8 % (ref 1.1–10.3)
POTASSIUM SERPL-SCNC: 4.1 MMOL/L (ref 3.7–5.3)
PROTHROMBIN TIME: 17.3 SEC (ref 11.5–14.2)
RBC # BLD: 3.51 M/UL (ref 3.95–5.11)
SARS-COV-2, RAPID: NOT DETECTED
SEG NEUTROPHILS: 69 % (ref 36–65)
SEGMENTED NEUTROPHILS ABSOLUTE COUNT: 4.03 K/UL (ref 1.5–8.1)
SODIUM BLD-SCNC: 141 MMOL/L (ref 135–144)
SPECIMEN DESCRIPTION: NORMAL
WBC # BLD: 5.9 K/UL (ref 3.5–11.3)

## 2022-02-26 PROCEDURE — 6360000004 HC RX CONTRAST MEDICATION: Performed by: EMERGENCY MEDICINE

## 2022-02-26 PROCEDURE — 85025 COMPLETE CBC W/AUTO DIFF WBC: CPT

## 2022-02-26 PROCEDURE — 85730 THROMBOPLASTIN TIME PARTIAL: CPT

## 2022-02-26 PROCEDURE — 2580000003 HC RX 258: Performed by: EMERGENCY MEDICINE

## 2022-02-26 PROCEDURE — 85055 RETICULATED PLATELET ASSAY: CPT

## 2022-02-26 PROCEDURE — 80048 BASIC METABOLIC PNL TOTAL CA: CPT

## 2022-02-26 PROCEDURE — 36415 COLL VENOUS BLD VENIPUNCTURE: CPT

## 2022-02-26 PROCEDURE — 87635 SARS-COV-2 COVID-19 AMP PRB: CPT

## 2022-02-26 PROCEDURE — 99285 EMERGENCY DEPT VISIT HI MDM: CPT

## 2022-02-26 PROCEDURE — 85610 PROTHROMBIN TIME: CPT

## 2022-02-26 PROCEDURE — 74174 CTA ABD&PLVS W/CONTRAST: CPT

## 2022-02-26 PROCEDURE — 6370000000 HC RX 637 (ALT 250 FOR IP): Performed by: EMERGENCY MEDICINE

## 2022-02-26 RX ORDER — 0.9 % SODIUM CHLORIDE 0.9 %
1000 INTRAVENOUS SOLUTION INTRAVENOUS ONCE
Status: COMPLETED | OUTPATIENT
Start: 2022-02-26 | End: 2022-02-26

## 2022-02-26 RX ORDER — TRAMADOL HYDROCHLORIDE 50 MG/1
50 TABLET ORAL ONCE
Status: COMPLETED | OUTPATIENT
Start: 2022-02-26 | End: 2022-02-26

## 2022-02-26 RX ADMIN — TRAMADOL HYDROCHLORIDE 50 MG: 50 TABLET, FILM COATED ORAL at 09:28

## 2022-02-26 RX ADMIN — SODIUM CHLORIDE 1000 ML: 9 INJECTION, SOLUTION INTRAVENOUS at 10:41

## 2022-02-26 RX ADMIN — IOPAMIDOL 75 ML: 755 INJECTION, SOLUTION INTRAVENOUS at 10:08

## 2022-02-26 ASSESSMENT — PAIN DESCRIPTION - PAIN TYPE
TYPE: ACUTE PAIN
TYPE: ACUTE PAIN

## 2022-02-26 ASSESSMENT — PAIN DESCRIPTION - LOCATION
LOCATION: GROIN
LOCATION: GROIN

## 2022-02-26 ASSESSMENT — PAIN DESCRIPTION - ORIENTATION
ORIENTATION: LEFT
ORIENTATION: LEFT

## 2022-02-26 ASSESSMENT — PAIN SCALES - GENERAL
PAINLEVEL_OUTOF10: 4
PAINLEVEL_OUTOF10: 1

## 2022-02-26 ASSESSMENT — PAIN - FUNCTIONAL ASSESSMENT: PAIN_FUNCTIONAL_ASSESSMENT: 0-10

## 2022-02-26 ASSESSMENT — PAIN DESCRIPTION - PROGRESSION
CLINICAL_PROGRESSION: GRADUALLY IMPROVING
CLINICAL_PROGRESSION: GRADUALLY IMPROVING

## 2022-02-26 ASSESSMENT — PAIN DESCRIPTION - FREQUENCY: FREQUENCY: CONTINUOUS

## 2022-02-26 ASSESSMENT — PAIN DESCRIPTION - DESCRIPTORS: DESCRIPTORS: DULL

## 2022-02-26 ASSESSMENT — PAIN DESCRIPTION - ONSET: ONSET: ON-GOING

## 2022-02-26 NOTE — ED NOTES
Patient was assisted to use bedpan. She is able to lift her hips to get on and off bedpan.  remains at bedside.      Ez Tobar RN  02/26/22 0700

## 2022-02-26 NOTE — ED PROVIDER NOTES
[facility] ED  [address]  Phone: [phone]  [place]      Pt Name: Alok Dooley  MRN: [id number]  [place] 1940  Date of evaluation: 2/26/2022    CHIEF COMPLAINT       Chief Complaint   Patient presents with    Post-op Problem    Groin Swelling     left groin at site used for cardiac ablation on 2-23-22       HISTORY [address] is a 79 y.o. female who presents with sudden onset of left groin pain. Is reported that the patient had a heart cath in [facility] 3 or 4 days ago for an ablation. The patient states that she was feeling fine until this morning when she got up she felt a sudden sharp pain in her left groin and since that time is noticed significant swelling. Patient denies any direct trauma to the area. No distal numbness or tingling. REVIEW OF SYSTEMS     Constitutional: No fevers or chills   HEENT: No sore throat, rhinorrhea, or earache   Eyes: No blurry vision or double vision no drainage   Cardiovascular: No chest pain or tachycardia   Respiratory: No wheezing or shortness of breath no cough   Gastrointestinal: No nausea, vomiting, diarrhea, constipation, or abdominal pain   : No hematuria or dysuria   Musculoskeletal: See above  Skin: No rash   Neurological: No focal neurologic complaints, paresthesias, weakness, or headache   PAST MEDICAL HISTORY    has a past medical history of Arthritis, Cervical spine degeneration, Chronic renal insufficiency, stage II (mild), Colon polyps, Constipation, Cystocele, Diverticulitis of colon, Dyspepsia, GERD (gastroesophageal reflux disease), GI bleed, Head ache, Hyperlipidemia, Hypertension, Multiple melanocytic nevi, Mumps, Over weight, Panic attacks, Paroxysmal atrial fibrillation ([address] Positive cardiac stress test, Shingles, and Tachycardia.     SURGICAL HISTORY      has a past surgical history that includes back surgery (1985, 1986); Colonoscopy (, ); colectomy (10/2006); Uterine fibroid embolization (10/2006); bladder repair (2009); Colonoscopy (); Colonoscopy (2019); Cardiac catheterization (2000); Cardiac catheterization (2021); transesophageal echocardiogram (2021); Cardioversion (2021); pr biopsy of breast, incisional (Right, ); pr punc/aspir breast cyst (Right); Breast surgery (Right, ); and MARILU STEREO BREAST BX W LOC DEVICE 1ST LESION RIGHT (Right, 2021). CURRENT MEDICATIONS       Previous Medications    ACETAMINOPHEN (TYLENOL) 325 MG TABLET    Take 650 mg by mouth every 6 hours as needed for Pain    AMIODARONE (CORDARONE) 200 MG TABLET    Take 1 tablet by mouth daily    APIXABAN (ELIQUIS) 2.5 MG TABS TABLET    Take 1 tablet by mouth 2 times daily    CHOLECALCIFEROL (VITAMIN D3) 1000 UNITS CAPS    Take by mouth Doubles her dose 3 days a week. On     CYANOCOBALAMIN (B-12) 1000 MCG CAPS    Take 1 tablet by mouth daily Indications: High Binding Capacity of Iron to Red Blood Cells     FUROSEMIDE (LASIX) 20 MG TABLET    Take 1 tablet by mouth daily    METOPROLOL TARTRATE (LOPRESSOR) 25 MG TABLET    TAKE 1 TABLET BY MOUTH TWICE A DAY    POLYETHYLENE GLYCOL (GLYCOLAX) POWDER    Take 17 g by mouth daily     SIMVASTATIN (ZOCOR) 20 MG TABLET    TAKE 1 TABLET EVERY EVENING    TAMOXIFEN (NOLVADEX) 20 MG TABLET    Take 20 mg by mouth daily       ALLERGIES     is allergic to adhesive tape, codeine, morphine, and other. FAMILY HISTORY     She indicated that her mother is . She indicated that her father is . She indicated that her sister is . family history includes Emphysema in her father; Heart Disease in her sister; High Blood Pressure in her mother; Osteoporosis in her sister. SOCIAL HISTORY      reports that she has never smoked. She has never used smokeless tobacco. She reports that she does not drink alcohol and does not use drugs.     PHYSICAL EXAM ED Triage Vitals   BP Temp Temp src Pulse Resp SpO2 Height Weight   -- -- -- -- -- -- -- --   Constitutional: Alert, oriented x3, nontoxic, answering questions appropriately, acting properly for age, in no acute distress   HEENT: Extraocular muscles intact, mucus membranes moist, Neck: Trachea midline   Cardiovascular: Regular rhythm and rate no murmurs   Respiratory: Clear to auscultation bilaterally no wheezes, rhonchi, rales, no respiratory distress no tachypnea no retractions no hypoxia  Gastrointestinal: Soft, nontender, nondistended, positive bowel sounds. No rebound, rigidity, or guarding. Musculoskeletal: There is marked swelling and redness with bruising noted in the left groin area. The extremity feels warm distally we will get Dopplers at this time. Neurologic: Moving all 4 extremities without difficulty there are no gross focal neurologic deficits   Skin: Warm and dry       DIFFERENTIAL DIAGNOSIS/ MDM:     Aneurysm versus pseudoaneurysm of the incision 6 cath site we will get ultrasound in and evaluate. Ultrasound was called but they will not come in for this evaluation will have to go to his CT scan. At 11:15 AM the CAT scan results show a possible pseudoaneurysm which goes along with her large hematoma on the left.   We will consult the tears at [facility] per patient request.  DIAGNOSTIC RESULTS     EKG: All EKG's are interpreted by the Emergency Department Physician who either signs or Co-signs this chart in the absence of a cardiologist.        Not indicated unless otherwise documented above    LABS:  Results for orders placed or performed during the hospital encounter of 93/23/19   Basic Metabolic Panel w/ Reflex to MG   Result Value Ref Range    Glucose 122 (H) 70 - 99 mg/dL    BUN 17 8 - 23 mg/dL    CREATININE 1.30 (H) 0.50 - 0.90 mg/dL    Bun/Cre Ratio 13 9 - 20    Calcium 9.3 8.6 - 10.4 mg/dL    Sodium 141 135 - 144 mmol/L    Potassium 4.1 3.7 - 5.3 mmol/L    Chloride 107 98 - 107 mmol/L    CO2 26 20 - 31 mmol/L    Anion Gap 8 (L) 9 - 17 mmol/L    GFR Non-African American 39 (L) >60 mL/min    GFR  48 (L) >60 mL/min    GFR Comment         CBC with Auto Differential   Result Value Ref Range    WBC 5.9 3.5 - 11.3 k/uL    RBC 3.51 (L) 3.95 - 5.11 m/uL    Hemoglobin 11.3 (L) 11.9 - 15.1 g/dL    Hematocrit 34.4 (L) 36.3 - 47.1 %    MCV 98.0 82.6 - 102.9 fL    MCH 32.2 25.2 - 33.5 pg    MCHC 32.8 25.2 - 33.5 g/dL    RDW 13.1 11.8 - 14.4 %    Platelets See Reflexed IPF Result 138 - 453 k/uL    NRBC Automated 0.0 0.0 per 100 WBC    Seg Neutrophils 69 (H) 36 - 65 %    Lymphocytes 20 (L) 24 - 43 %    Monocytes 8 3 - 12 %    Eosinophils % 2 1 - 4 %    Basophils 1 0 - 2 %    Immature Granulocytes 0 0 %    Segs Absolute 4.03 1.50 - 8.10 k/uL    Absolute Lymph # 1.20 1.10 - 3.70 k/uL    Absolute Mono # 0.48 0.10 - 1.20 k/uL    Absolute Eos # 0.10 0.00 - 0.44 k/uL    Basophils Absolute 0.04 0.00 - 0.20 k/uL    Absolute Immature Granulocyte <0.03 0.00 - 0.30 k/uL   Protime-INR   Result Value Ref Range    Protime 17.3 (H) 11.5 - 14.2 sec    INR 1.5    APTT   Result Value Ref Range    PTT 27.3 23.9 - 33.8 sec   Immature Platelet Fraction   Result Value Ref Range    Platelet, Immature Fraction 9.8 1.1 - 10.3 %    Platelet, Fluorescence 127 (L) 138 - 453 k/uL       Not indicated unless otherwise documented above    RADIOLOGY:   I reviewed the radiologist interpretations:    CTA ABDOMEN PELVIS W WO CONTRAST   Preliminary Result   Large left groin hematoma status post reported cardiac catheterization; 2.0   _____  probable pseudoaneurysm, likely originating from a medial branch   femoral artery, as above with a narrow neck. Compressed femoral vein with   possible additional adjacent hematoma. Correlation with vascular/Doppler   ultrasound recommended. 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   Correlation with pelvic ultrasound recommended. Cardiomegaly with four-chamber involvement. Findings compatible with mild pulmonary edema and small bilateral pleural   effusions, larger on the left. Aberrant visceral anatomy, as described. Prior granulomatous disease. Diverticulosis without CT evidence diverticulitis. RECOMMENDATIONS:   As above. Not indicated unless otherwise documented above    EMERGENCY DEPARTMENT COURSE:     The patient was given the following medications:  Orders Placed This Encounter   Medications    traMADol (ULTRAM) tablet 50 mg    iopamidol (ISOVUE-370) 76 % injection 75 mL    0.9 % sodium chloride bolus        Vitals:   -------------------------  /89   Pulse 58   Temp 97.9 °F (36.6 °C) (Tympanic)   Resp 14   Ht 5' 2\" (1.575 m)   Wt 190 lb (86.2 kg)   LMP  (LMP Unknown)   SpO2 98%   BMI 34.75 kg/m²         I have reviewed the disposition diagnosis with the patient and or their family/guardian. I have answered their questions and given discharge instructions. They voiced understanding of these instructions and did not have any furtherquestions or complaints. CRITICAL CARE:    CRITICAL CARE: There was a high probability of clinically significant/life threatening deterioration in this patient's condition which required my urgent intervention. Total critical care time was 24 minutes. This excludes any time for separately reportable procedures. CONSULTS:  At 11:55 AM I spoke to the cardiologist at [facility] and he is willing to take the patient in transfer for evaluation of a pseudoaneurysm    PROCEDURES:    None      OARRS Report if indicated             FINAL IMPRESSION      1. Pseudoaneurysm of left femoral artery ([address] [facility] [address]          DISPOSITION/PLAN   DISPOSITION Decision To Transfer 02/26/2022 11:23:24 AM        CONDITION ON DISPOSITION: STABLE       PATIENT REFERRED TO:  No follow-up provider specified.     DISCHARGE MEDICATIONS:  New Prescriptions    No medications on file       (Please note that portions of thisnote were completed with a voice recognition program.  Efforts were made to edit the dictations but occasionally words are mis-transcribed.)    Rosenda Cummings MD,, MD  Attending Emergency Physician        Rosenda Cummings MD  02/26/22 [phone]

## 2022-02-26 NOTE — ED NOTES
Upper thighs measured over the area of ecchymosis on the left leg, marked with skin marker. Left upper thigh measures 31in in diameter, Right measures 29.5in.      Jenni Pedroza RN  02/26/22 1948

## 2022-02-26 NOTE — ED NOTES
Covid result reports to Nettie Wilson at [facility].      Angelita Uribe RN  02/26/22 [id number]

## 2022-03-07 ENCOUNTER — OFFICE VISIT (OUTPATIENT)
Dept: FAMILY MEDICINE CLINIC | Age: 82
End: 2022-03-07
Payer: MEDICARE

## 2022-03-07 VITALS
TEMPERATURE: 97.2 F | BODY MASS INDEX: 34.02 KG/M2 | OXYGEN SATURATION: 99 % | DIASTOLIC BLOOD PRESSURE: 66 MMHG | HEART RATE: 55 BPM | SYSTOLIC BLOOD PRESSURE: 128 MMHG | RESPIRATION RATE: 18 BRPM | WEIGHT: 186 LBS

## 2022-03-07 DIAGNOSIS — E78.5 HYPERLIPIDEMIA, UNSPECIFIED HYPERLIPIDEMIA TYPE: ICD-10-CM

## 2022-03-07 DIAGNOSIS — E78.2 MIXED HYPERLIPIDEMIA: ICD-10-CM

## 2022-03-07 DIAGNOSIS — Z00.00 WELLNESS EXAMINATION: ICD-10-CM

## 2022-03-07 DIAGNOSIS — Z13.21 ENCOUNTER FOR VITAMIN DEFICIENCY SCREENING: ICD-10-CM

## 2022-03-07 DIAGNOSIS — I10 ESSENTIAL HYPERTENSION: ICD-10-CM

## 2022-03-07 DIAGNOSIS — I48.91 ATRIAL FIBRILLATION, UNSPECIFIED TYPE (HCC): ICD-10-CM

## 2022-03-07 DIAGNOSIS — N85.2 ENLARGED UTERUS: Primary | ICD-10-CM

## 2022-03-07 PROCEDURE — 1090F PRES/ABSN URINE INCON ASSESS: CPT | Performed by: FAMILY MEDICINE

## 2022-03-07 PROCEDURE — G8399 PT W/DXA RESULTS DOCUMENT: HCPCS | Performed by: FAMILY MEDICINE

## 2022-03-07 PROCEDURE — G8417 CALC BMI ABV UP PARAM F/U: HCPCS | Performed by: FAMILY MEDICINE

## 2022-03-07 PROCEDURE — 99212 OFFICE O/P EST SF 10 MIN: CPT | Performed by: FAMILY MEDICINE

## 2022-03-07 PROCEDURE — 99214 OFFICE O/P EST MOD 30 MIN: CPT | Performed by: FAMILY MEDICINE

## 2022-03-07 PROCEDURE — 1123F ACP DISCUSS/DSCN MKR DOCD: CPT | Performed by: FAMILY MEDICINE

## 2022-03-07 PROCEDURE — G8484 FLU IMMUNIZE NO ADMIN: HCPCS | Performed by: FAMILY MEDICINE

## 2022-03-07 PROCEDURE — 4040F PNEUMOC VAC/ADMIN/RCVD: CPT | Performed by: FAMILY MEDICINE

## 2022-03-07 PROCEDURE — 1036F TOBACCO NON-USER: CPT | Performed by: FAMILY MEDICINE

## 2022-03-07 PROCEDURE — G8427 DOCREV CUR MEDS BY ELIG CLIN: HCPCS | Performed by: FAMILY MEDICINE

## 2022-03-07 RX ORDER — POTASSIUM CHLORIDE 20 MEQ/1
20 TABLET, EXTENDED RELEASE ORAL DAILY
COMMUNITY
Start: 2022-02-23 | End: 2022-08-29

## 2022-03-07 RX ORDER — METOPROLOL SUCCINATE 50 MG/1
TABLET, EXTENDED RELEASE ORAL DAILY
COMMUNITY
Start: 2022-01-10

## 2022-03-07 ASSESSMENT — ENCOUNTER SYMPTOMS
WHEEZING: 0
ABDOMINAL PAIN: 0
CHEST TIGHTNESS: 0
PHOTOPHOBIA: 0
SHORTNESS OF BREATH: 0

## 2022-03-07 NOTE — PATIENT INSTRUCTIONS
Nutrition Health Education:    Keep hydrated, walk 30 minutes minimum 3 times weekly as tolerated. Diet should consist of low fat, low sodium and high fiber. Nutritious foods such as fruits (if you're not diabetic), vegetables, lean meats, lean dairy, whole grains such as brown rice, quinoa, and dry beans. Elspeth Pata with small amounts of heart healthy extra virgin olive oil. Be watchful of any extra salt/sugar/seasoning to your food. You should eat no more than 2 grams or 2,000 mg of salt daily. Salt will raise your BP. Avoid regular/diet sodas, caffeine, energy drinks as these are full of artificial ingredients/sweeteners, sugar, salt and chemicals that spike insulin and are harmful to your health. Sugar intake increases metabolic disfunction, type 2 diabetes, insulin resistance, addictive food behavior and obesity. Avoid all processed foods, foods from boxes, cans, microwave meals as these contain high salt, sugar or fat content and not much nutrition. Get at least 8 hrs of sleep every night and turn off all electronics at least 1 hour before bedtime as these decreases melatonin production and increases wakefulness. If your cholesterol is high, no greasy, fried, fast or fatty foods. Decrease red meat intake. No butter, figueroa, lard or creams, no milk as these things clog your arteries and leads to heart attacks and death. If you smoke, smoking increases risk of lung disease, cancers, high BP, heart attack, stroke and death. Take your daily medications as prescribed and inform your family doctor if you are having any side effects or issues taking medications.     Elevated Cholesterol:   No greasy, fried, fast, fatty foods   No saturated fats   Decreased red meat intake to once every 2 months   No butter, figueroa nor cream cheese   No egg yolk   NO milk   Decrease your cholesterol in diet    Start vit D3 2,000IU every day  Start calcium 600 mg one pill twice a day    Fasting blood work in 1 month and wellness exam

## 2022-03-07 NOTE — PROGRESS NOTES
Name: [place]  DOS: 3/7/2022  MRN: [id number]      Subjective:  [place] is a 79 y.o. female being seen for   Chief Complaint   Patient presents with    Established New Doctor       Vitals:    03/07/22 0856   BP: 128/66   Pulse: 55   Resp: 18   Temp: 97.2 °F (36.2 °C)   SpO2: 99%     Allergies   Allergen Reactions    Adhesive Tape      rash    Codeine Other (See Comments)     nightmares    Morphine Other (See Comments)     Nightmares      Other      Bee stings - swelling     Past Medical History:   Diagnosis Date    Arthritis     degenerative    Cervical spine degeneration     Chronic renal insufficiency, stage II (mild)     Colon polyps     history of     Constipation     Cystocele     Diverticulitis of colon     history of     Dyspepsia     history of     GERD (gastroesophageal reflux disease)     GI bleed Jan 2016    Head ache     Hyperlipidemia     Hypertension     Multiple melanocytic nevi     Mumps     Over weight     Panic attacks     history of     Paroxysmal atrial fibrillation (HCC)     Positive cardiac stress test 03/2021    Shingles     history of     Tachycardia     episodic     Past Surgical History:   Procedure Laterality Date    BACK SURGERY  1985, 1986    BLADDER REPAIR  05/06/2009    Anterior repair with Prolift mesh.  BREAST SURGERY Right 1994    biopsy, benign    CARDIAC CATHETERIZATION  11/28/2000    CARDIAC CATHETERIZATION  03/19/2021    CARDIOVERSION  06/11/2021    COLECTOMY  10/2006    COLONOSCOPY  2006, 2009    x2    COLONOSCOPY  2016    Dr. Susanna Galindo.   Repeat 2019    COLONOSCOPY  03/2019    Cecal Polyp  Done at [facility]    MARILU STEROTACTIC LOC BREAST BIOPSY RIGHT Right 7/12/2021    MARILU STEROTACTIC LOC BREAST BIOPSY RIGHT 7/12/2021 Alena Lundberg, DO [facility]    NM BIOPSY OF BREAST, INCISIONAL Right 1994    benign    NM PUNC/ASPIR BREAST CYST Right     unsure date    TRANSESOPHAGEAL ECHOCARDIOGRAM 06/11/2021    UTERINE FIBROID EMBOLIZATION  10/2006     Social History     Socioeconomic History    Marital status:      Spouse name: None    Number of children: None    Years of education: None    Highest education level: None   Occupational History    None   Tobacco Use    Smoking status: Never Smoker    Smokeless tobacco: Never Used    Tobacco comment: shivani rrt 7/15/17   Vaping Use    Vaping Use: Never used   Substance and Sexual Activity    Alcohol use: No    Drug use: No    Sexual activity: Yes     Partners: Male   Other Topics Concern    None   Social History Narrative    None     Social Determinants of Health     Financial Resource Strain: Low Risk     Difficulty of Paying Living Expenses: Not hard at all   Food Insecurity: No Food Insecurity    Worried About Running Out of Food in the Last Year: Never true    [address] in the Last Year: Never true   Transportation Needs:     Lack of Transportation (Medical): Not on file    Lack of Transportation (Non-Medical):  Not on file   Physical Activity:     Days of Exercise per Week: Not on file    Minutes of Exercise per Session: Not on file   Stress:     Feeling of Stress : Not on file   Social Connections:     Frequency of Communication with Friends and Family: Not on file    Frequency of Social Gatherings with Friends and Family: Not on file    Attends Congregational Services: Not on file    Active Member of [address] [place] [address] or Organizations: Not on file    Attends Club or Organization Meetings: Not on file    Marital Status: Not on file   Intimate Partner Violence:     Fear of Current or Ex-Partner: Not on file    Emotionally Abused: Not on file    Physically Abused: Not on file    Sexually Abused: Not on file   Housing Stability:     Unable to Pay for Housing in the Last Year: Not on file    Number of [place] in the Last Year: Not on file    Unstable Housing in the Last Year: Not on file       Current Outpatient Medications   Medication Sig Dispense Refill    metoprolol succinate (TOPROL XL) 50 MG extended release tablet take 1 tablet by mouth twice a day      potassium chloride (KLOR-CON M) 20 MEQ extended release tablet Take 20 mEq by mouth daily      amiodarone (CORDARONE) 200 MG tablet Take 1 tablet by mouth daily 90 tablet 3    furosemide (LASIX) 20 MG tablet Take 1 tablet by mouth daily 180 tablet 3    apixaban (ELIQUIS) 2.5 MG TABS tablet Take 1 tablet by mouth 2 times daily 180 tablet 3    simvastatin (ZOCOR) 20 MG tablet TAKE 1 TABLET EVERY EVENING 90 tablet 3    tamoxifen (NOLVADEX) 20 MG tablet Take 20 mg by mouth daily      acetaminophen (TYLENOL) 325 MG tablet Take 650 mg by mouth every 6 hours as needed for Pain      Cholecalciferol (VITAMIN D3) 1000 units CAPS Take by mouth Doubles her dose 3 days a week. On M-W-F      Cyanocobalamin (B-12) 1000 MCG CAPS Take 1 tablet by mouth daily Indications: High Binding Capacity of Iron to Red Blood Cells       polyethylene glycol (GLYCOLAX) powder Take 17 g by mouth daily        No current facility-administered medications for this visit. Objective:  Pt is here with , feels good today without complaints. Just had a cardio ablation left groin ended up with an an aneurysm was treated with an ejection. Vit d is not covered, she doesn't want to check as she takes vit D. Review of Systems   Constitutional: Negative for fatigue and unexpected weight change. Eyes: Negative for photophobia and visual disturbance. Respiratory: Negative for chest tightness, shortness of breath and wheezing. Cardiovascular: Negative for chest pain, palpitations and leg swelling. Gastrointestinal: Negative for abdominal pain. Genitourinary: Negative for difficulty urinating and hematuria. Musculoskeletal: Negative for arthralgias and myalgias. Skin: Negative for rash and wound.    Neurological: Negative for dizziness, tremors, weakness, light-headedness, numbness and headaches. Hematological: Bruises/bleeds easily (because of the eloquis). Psychiatric/Behavioral: Negative for agitation, confusion, self-injury, sleep disturbance and suicidal ideas. The patient is not nervous/anxious and is not hyperactive. Physical Exam  Constitutional:       Appearance: Normal appearance. HENT:      Head: Normocephalic and atraumatic. Right Ear: Tympanic membrane and external ear normal.      Left Ear: Tympanic membrane and external ear normal.      Nose: Nose normal.      Mouth/Throat:      Mouth: Mucous membranes are moist.      Pharynx: Oropharynx is clear. Eyes:      Extraocular Movements: Extraocular movements intact. Conjunctiva/sclera: Conjunctivae normal.      Pupils: Pupils are equal, round, and reactive to light. Cardiovascular:      Rate and Rhythm: Normal rate and regular rhythm. Pulses: Normal pulses. Heart sounds: Normal heart sounds. No murmur heard. Pulmonary:      Effort: Pulmonary effort is normal.      Breath sounds: Normal breath sounds. Abdominal:      General: Abdomen is flat. Bowel sounds are normal. There is no distension. Palpations: Abdomen is soft. There is no mass. Tenderness: There is no abdominal tenderness. There is no guarding. Musculoskeletal:         General: Normal range of motion. Cervical back: Normal range of motion and neck supple. Lymphadenopathy:      Cervical: No cervical adenopathy. Skin:     General: Skin is warm. Capillary Refill: Capillary refill takes less than 2 seconds. Neurological:      General: No focal deficit present. Mental Status: She is alert and oriented to person, place, and time. Psychiatric:         Mood and Affect: Mood normal.         Behavior: Behavior normal.         Thought Content: Thought content normal.          Assessment:   Diagnosis Orders   1. Enlarged uterus  US PELVIS COMPLETE   2. Atrial fibrillation, unspecified type ([address]     3. Essential hypertension  Comprehensive Metabolic Panel    Lipid Panel    TSH with Reflex   4. Mixed hyperlipidemia     5. Wellness examination  Comprehensive Metabolic Panel    Lipid Panel    TSH with Reflex   6. Encounter for vitamin deficiency screening     7. Hyperlipidemia, unspecified hyperlipidemia type  Comprehensive Metabolic Panel    Lipid Panel    TSH with Reflex         Plan:  Orders Placed This Encounter   Procedures    US PELVIS COMPLETE     Standing Status:   Future     Standing Expiration Date:   5/7/2022     Order Specific Question:   Reason for exam:     Answer:   enlated uterus    Comprehensive Metabolic Panel     Standing Status:   Future     Standing Expiration Date:   5/7/2022    Lipid Panel     Standing Status:   Future     Standing Expiration Date:   5/7/2022     Order Specific Question:   Is Patient Fasting?/# of Hours     Answer: Yes    TSH with Reflex     Standing Status:   Future     Standing Expiration Date:   5/7/2022         Patient Instructions   Nutrition Health Education:    Keep hydrated, walk 30 minutes minimum 3 times weekly as tolerated. Diet should consist of low fat, low sodium and high fiber. Nutritious foods such as fruits (if you're not diabetic), vegetables, lean meats, lean dairy, whole grains such as brown rice, quinoa, and dry beans. Magdaline Coal with small amounts of heart healthy extra virgin olive oil. Be watchful of any extra salt/sugar/seasoning to your food. You should eat no more than 2 grams or 2,000 mg of salt daily. Salt will raise your BP. Avoid regular/diet sodas, caffeine, energy drinks as these are full of artificial ingredients/sweeteners, sugar, salt and chemicals that spike insulin and are harmful to your health. Sugar intake increases metabolic disfunction, type 2 diabetes, insulin resistance, addictive food behavior and obesity.  Avoid all processed foods, foods from boxes, cans, microwave meals as these contain high salt, sugar or fat content and not much nutrition. Get at least 8 hrs of sleep every night and turn off all electronics at least 1 hour before bedtime as these decreases melatonin production and increases wakefulness. If your cholesterol is high, no greasy, fried, fast or fatty foods. Decrease red meat intake. No butter, figueroa, lard or creams, no milk as these things clog your arteries and leads to heart attacks and death. If you smoke, smoking increases risk of lung disease, cancers, high BP, heart attack, stroke and death. Take your daily medications as prescribed and inform your family doctor if you are having any side effects or issues taking medications. Elevated Cholesterol:   No greasy, fried, fast, fatty foods   No saturated fats   Decreased red meat intake to once every 2 months   No butter, figueroa nor cream cheese   No egg yolk   NO milk   Decrease your cholesterol in diet    Start vit D3 2,000IU every day  Start calcium 600 mg one pill twice a day    Fasting blood work in 1 month and wellness exam       No follow-ups on file.      Twyla Esteves DO

## 2022-03-22 ENCOUNTER — TELEPHONE (OUTPATIENT)
Dept: FAMILY MEDICINE CLINIC | Age: 82
End: 2022-03-22

## 2022-03-22 ENCOUNTER — HOSPITAL ENCOUNTER (OUTPATIENT)
Dept: ULTRASOUND IMAGING | Age: 82
Discharge: HOME | End: 2022-03-24
Payer: MEDICARE

## 2022-03-22 DIAGNOSIS — N85.2 ENLARGED UTERUS: ICD-10-CM

## 2022-03-22 DIAGNOSIS — N85.8 UTERINE MASS: Primary | ICD-10-CM

## 2022-03-22 PROCEDURE — 76856 US EXAM PELVIC COMPLETE: CPT

## 2022-03-22 NOTE — RESULT ENCOUNTER NOTE
Call patient she has an Endometrial canal is  fluid-filled with a masslike area of soft tissue  measuring approximately 5.3 x 4.8 x 3.8 cm. She needs to be referred to GYN who would she like to go to.  If she does not know please refer her to Dr. Cathy Lowe

## 2022-03-22 NOTE — TELEPHONE ENCOUNTER
----- Message from Marsha Saldaña DO sent at 3/22/2022 11:03 AM EDT -----  Call patient she has an Endometrial canal is  fluid-filled with a masslike area of soft tissue  measuring approximately 5.3 x 4.8 x 3.8 cm. She needs to be referred to GYN who would she like to go to.  If she does not know please refer her to Dr. Juhi Barclay

## 2022-03-30 ENCOUNTER — HOSPITAL ENCOUNTER (OUTPATIENT)
Dept: LAB | Age: 82
Discharge: HOME | End: 2022-03-30
Payer: MEDICARE

## 2022-03-30 DIAGNOSIS — E78.5 HYPERLIPIDEMIA, UNSPECIFIED HYPERLIPIDEMIA TYPE: ICD-10-CM

## 2022-03-30 DIAGNOSIS — I10 ESSENTIAL HYPERTENSION: ICD-10-CM

## 2022-03-30 DIAGNOSIS — Z00.00 WELLNESS EXAMINATION: ICD-10-CM

## 2022-03-30 LAB
ALBUMIN SERPL-MCNC: 3.9 G/DL (ref 3.5–5.2)
ALBUMIN/GLOBULIN RATIO: 1.7 (ref 1–2.5)
ALP BLD-CCNC: 58 U/L (ref 35–104)
ALT SERPL-CCNC: 19 U/L (ref 5–33)
ANION GAP SERPL CALCULATED.3IONS-SCNC: 9 MMOL/L (ref 9–17)
AST SERPL-CCNC: 24 U/L
BILIRUB SERPL-MCNC: 0.68 MG/DL (ref 0.3–1.2)
BUN BLDV-MCNC: 23 MG/DL (ref 8–23)
BUN/CREAT BLD: 14 (ref 9–20)
CALCIUM SERPL-MCNC: 9.5 MG/DL (ref 8.6–10.4)
CHLORIDE BLD-SCNC: 105 MMOL/L (ref 98–107)
CO2: 28 MMOL/L (ref 20–31)
CREAT SERPL-MCNC: 1.62 MG/DL (ref 0.5–0.9)
GFR AFRICAN AMERICAN: 37 ML/MIN
GFR NON-AFRICAN AMERICAN: 30 ML/MIN
GFR SERPL CREATININE-BSD FRML MDRD: ABNORMAL ML/MIN/{1.73_M2}
GLUCOSE BLD-MCNC: 105 MG/DL (ref 70–99)
POTASSIUM SERPL-SCNC: 4.7 MMOL/L (ref 3.7–5.3)
SODIUM BLD-SCNC: 142 MMOL/L (ref 135–144)
TOTAL PROTEIN: 6.2 G/DL (ref 6.4–8.3)
TSH SERPL DL<=0.05 MIU/L-ACNC: 3.25 UIU/ML (ref 0.3–5)

## 2022-03-30 PROCEDURE — 80053 COMPREHEN METABOLIC PANEL: CPT

## 2022-03-30 PROCEDURE — 84443 ASSAY THYROID STIM HORMONE: CPT

## 2022-03-30 PROCEDURE — 80061 LIPID PANEL: CPT

## 2022-03-30 PROCEDURE — 36415 COLL VENOUS BLD VENIPUNCTURE: CPT

## 2022-03-31 LAB
CHOLESTEROL/HDL RATIO: 2.5
CHOLESTEROL: 190 MG/DL
HDLC SERPL-MCNC: 77 MG/DL
LDL CHOLESTEROL: 95 MG/DL (ref 0–130)
TRIGL SERPL-MCNC: 91 MG/DL

## 2022-04-07 ENCOUNTER — OFFICE VISIT (OUTPATIENT)
Dept: FAMILY MEDICINE CLINIC | Age: 82
End: 2022-04-07
Payer: MEDICARE

## 2022-04-07 VITALS
SYSTOLIC BLOOD PRESSURE: 120 MMHG | BODY MASS INDEX: 34.2 KG/M2 | DIASTOLIC BLOOD PRESSURE: 66 MMHG | TEMPERATURE: 97 F | WEIGHT: 187 LBS | HEART RATE: 60 BPM | RESPIRATION RATE: 16 BRPM | OXYGEN SATURATION: 97 %

## 2022-04-07 DIAGNOSIS — N85.2 UTERINE ENLARGEMENT: ICD-10-CM

## 2022-04-07 DIAGNOSIS — N18.32 STAGE 3B CHRONIC KIDNEY DISEASE (HCC): ICD-10-CM

## 2022-04-07 DIAGNOSIS — E78.5 HYPERLIPIDEMIA, UNSPECIFIED HYPERLIPIDEMIA TYPE: ICD-10-CM

## 2022-04-07 DIAGNOSIS — I48.0 PAROXYSMAL ATRIAL FIBRILLATION (HCC): ICD-10-CM

## 2022-04-07 DIAGNOSIS — Z00.00 MEDICARE ANNUAL WELLNESS VISIT, SUBSEQUENT: Primary | ICD-10-CM

## 2022-04-07 DIAGNOSIS — I10 ESSENTIAL HYPERTENSION: ICD-10-CM

## 2022-04-07 PROCEDURE — 4040F PNEUMOC VAC/ADMIN/RCVD: CPT | Performed by: FAMILY MEDICINE

## 2022-04-07 PROCEDURE — G0439 PPPS, SUBSEQ VISIT: HCPCS | Performed by: FAMILY MEDICINE

## 2022-04-07 PROCEDURE — 1123F ACP DISCUSS/DSCN MKR DOCD: CPT | Performed by: FAMILY MEDICINE

## 2022-04-07 PROCEDURE — 99213 OFFICE O/P EST LOW 20 MIN: CPT

## 2022-04-07 ASSESSMENT — PATIENT HEALTH QUESTIONNAIRE - PHQ9
2. FEELING DOWN, DEPRESSED OR HOPELESS: 0
SUM OF ALL RESPONSES TO PHQ QUESTIONS 1-9: 0
SUM OF ALL RESPONSES TO PHQ QUESTIONS 1-9: 0
SUM OF ALL RESPONSES TO PHQ9 QUESTIONS 1 & 2: 0
SUM OF ALL RESPONSES TO PHQ QUESTIONS 1-9: 0
1. LITTLE INTEREST OR PLEASURE IN DOING THINGS: 0
SUM OF ALL RESPONSES TO PHQ QUESTIONS 1-9: 0

## 2022-04-07 NOTE — PROGRESS NOTES
Medicare Annual Wellness Visit    Sweta Baron is here for Medicare AWV    Assessment & Plan   Medicare annual wellness visit, subsequent  -     Comprehensive Metabolic Panel; Future  -     Lipid Panel; Future  Essential hypertension  -     Comprehensive Metabolic Panel; Future  -     Lipid Panel; Future  Hyperlipidemia, unspecified hyperlipidemia type  Uterine enlargement  Paroxysmal atrial fibrillation (HCC)  Stage 3b chronic kidney disease ([facility] [address]      Recommendations for Preventive Services Due: see orders and patient instructions/AVS.  Recommended screening schedule for the next 5-10 years is provided to the patient in written form: see Patient Instructions/AVS.     Return in 4 months (on 8/7/2022) for Medicare Annual Wellness Visit in 1 year, HTN,CHOL. Subjective   The following acute and/or chronic problems were also addressed today:  Uterine enlargement , pt is going to see a GYN has liza  Chronic kidney disease slightly worse under care of nephrology    Patient's complete Health Risk Assessment and screening values have been reviewed and are found in Flowsheets. The following problems were reviewed today and where indicated follow up appointments were made and/or referrals ordered. Positive Risk Factor Screenings with Interventions:              Health Habits/Nutrition:     Physical Activity:     Days of Exercise per Week: Not on file    Minutes of Exercise per Session: Not on file                Health Habits/Nutrition Interventions:  · none             Objective   Vitals:    04/07/22 1040   BP: 120/66   Site: Right Upper Arm   Position: Sitting   Cuff Size: Medium Adult   Pulse: 60   Resp: 16   Temp: 97 °F (36.1 °C)   SpO2: 97%   Weight: 187 lb (84.8 kg)      Body mass index is 34.2 kg/m².                Allergies   Allergen Reactions    Adhesive Tape      rash    Codeine Other (See Comments)     nightmares    Morphine Other (See Comments)     Nightmares      Other      Bee stings - swelling     Prior to Visit Medications    Medication Sig Taking? Authorizing Provider   metoprolol succinate (TOPROL XL) 50 MG extended release tablet take 1 tablet by mouth twice a day Yes Historical Provider, MD   potassium chloride (KLOR-CON M) 20 MEQ extended release tablet Take 20 mEq by mouth daily Yes Historical Provider, MD   amiodarone (CORDARONE) 200 MG tablet Take 1 tablet by mouth daily Yes Blaise Gottlieb DO   furosemide (LASIX) 20 MG tablet Take 1 tablet by mouth daily Yes Blaise Gottlieb DO   apixaban (ELIQUIS) 2.5 MG TABS tablet Take 1 tablet by mouth 2 times daily Yes Blaise Gottlieb DO   simvastatin (ZOCOR) 20 MG tablet TAKE 1 TABLET EVERY EVENING Yes Blaise Gottlieb DO   tamoxifen (NOLVADEX) 20 MG tablet Take 20 mg by mouth daily Yes Historical Provider, MD   acetaminophen (TYLENOL) 325 MG tablet Take 650 mg by mouth every 6 hours as needed for Pain Yes Historical Provider, MD   Cholecalciferol (VITAMIN D3) 1000 units CAPS Take by mouth Doubles her dose 3 days a week.  On M-W-F Yes Historical Provider, MD   Cyanocobalamin (B-12) 1000 MCG CAPS Take 1 tablet by mouth daily Indications: High Binding Capacity of Iron to Red Blood Cells  Yes Historical Provider, MD   polyethylene glycol (GLYCOLAX) powder Take 17 g by mouth daily   Historical Provider, MD [place] (Including outside providers/suppliers regularly involved in providing care):   Patient Care Team:  Valentine Uriostegui DO as PCP - General (Family Medicine)  Valentine Uriostegui DO as PCP - REHABILITATION [facility] Empaneled Provider  Sue Sheridan MD as Consulting Physician (Cardiology)  Maria De Jesus Ken MD as Surgeon (Colon and Rectal Surgery)    Reviewed and updated this visit:  Tobacco  Allergies  Meds  Med Hx  Surg Hx  Soc Hx  Fam Hx

## 2022-04-07 NOTE — PATIENT INSTRUCTIONS
Personalized Preventive Plan for [place] - 4/7/2022  Medicare offers a range of preventive health benefits. Some of the tests and screenings are paid in full while other may be subject to a deductible, co-insurance, and/or copay. Some of these benefits include a comprehensive review of your medical history including lifestyle, illnesses that may run in your family, and various assessments and screenings as appropriate. After reviewing your medical record and screening and assessments performed today your provider may have ordered immunizations, labs, imaging, and/or referrals for you. A list of these orders (if applicable) as well as your Preventive Care list are included within your After Visit Summary for your review. Other Preventive Recommendations:    · A preventive eye exam performed by an eye specialist is recommended every 1-2 years to screen for glaucoma; cataracts, macular degeneration, and other eye disorders. · A preventive dental visit is recommended every 6 months. · Try to get at least 150 minutes of exercise per week or 10,000 steps per day on a pedometer . · Order or download the FREE \"Exercise & Physical Activity: Your Everyday Guide\" from [employer]. Call 5-570.836.6186 or search [employer] online. · You need 9560-9971 mg of calcium and 0585-2952 IU of vitamin D per day. It is possible to meet your calcium requirement with diet alone, but a vitamin D supplement is usually necessary to meet this goal.  · When exposed to the sun, use a sunscreen that protects against both UVA and UVB radiation with an SPF of 30 or greater. Reapply every 2 to 3 hours or after sweating, drying off with a towel, or swimming. · Always wear a seat belt when traveling in a car. Always wear a helmet when riding a bicycle or motorcycle.     Pt has an appt with GYN 04/20/2022  Under care of nephrology   Under care of cardiology  1 year for yearly and 4 months for F/U

## 2022-04-20 ENCOUNTER — OFFICE VISIT (OUTPATIENT)
Dept: OBGYN | Age: 82
End: 2022-04-20
Payer: MEDICARE

## 2022-04-20 VITALS
WEIGHT: 186.2 LBS | BODY MASS INDEX: 34.27 KG/M2 | HEIGHT: 62 IN | SYSTOLIC BLOOD PRESSURE: 132 MMHG | HEART RATE: 70 BPM | DIASTOLIC BLOOD PRESSURE: 60 MMHG

## 2022-04-20 DIAGNOSIS — N85.8 MASS OF UTERUS: ICD-10-CM

## 2022-04-20 DIAGNOSIS — D25.9 UTERINE LEIOMYOMA, UNSPECIFIED LOCATION: ICD-10-CM

## 2022-04-20 DIAGNOSIS — R93.89 ENDOMETRIAL THICKENING ON ULTRASOUND: Primary | ICD-10-CM

## 2022-04-20 PROBLEM — N18.30 CHRONIC RENAL DISEASE, STAGE III (HCC): Status: ACTIVE | Noted: 2022-04-20

## 2022-04-20 PROCEDURE — 99213 OFFICE O/P EST LOW 20 MIN: CPT | Performed by: OBSTETRICS & GYNECOLOGY

## 2022-04-20 PROCEDURE — 1090F PRES/ABSN URINE INCON ASSESS: CPT | Performed by: OBSTETRICS & GYNECOLOGY

## 2022-04-20 PROCEDURE — G8399 PT W/DXA RESULTS DOCUMENT: HCPCS | Performed by: OBSTETRICS & GYNECOLOGY

## 2022-04-20 PROCEDURE — 99214 OFFICE O/P EST MOD 30 MIN: CPT | Performed by: OBSTETRICS & GYNECOLOGY

## 2022-04-20 PROCEDURE — 4040F PNEUMOC VAC/ADMIN/RCVD: CPT | Performed by: OBSTETRICS & GYNECOLOGY

## 2022-04-20 PROCEDURE — 1036F TOBACCO NON-USER: CPT | Performed by: OBSTETRICS & GYNECOLOGY

## 2022-04-20 PROCEDURE — G8427 DOCREV CUR MEDS BY ELIG CLIN: HCPCS | Performed by: OBSTETRICS & GYNECOLOGY

## 2022-04-20 PROCEDURE — G8417 CALC BMI ABV UP PARAM F/U: HCPCS | Performed by: OBSTETRICS & GYNECOLOGY

## 2022-04-20 PROCEDURE — 1123F ACP DISCUSS/DSCN MKR DOCD: CPT | Performed by: OBSTETRICS & GYNECOLOGY

## 2022-04-20 NOTE — PROGRESS NOTES
1421 [address] Leon   2022    YOB: 1940          The patient was seen today. She is here regarding results of pelvic sono. Pt had pelvic sono after enlarged uterus seen on CT scan. Incidental finding. CT scan performed after hematoma in groin after a cardiac catheterization. Enlarged uterus identified with a soft tissue like mass. Pt is on tamoxifen since . Pt had a stereotactic biopsy showing DCIS. Pt then underwent a partial mastectomy at [facility]. Pt has been on tamoxifen sice x 5 years. Pt had a uterine artery embolization for uterine fibroid in . Pt does not recall this surgery/ procedure and has no specifics regarding this. Pt had a CT scan  showing an enlarged uterus. Pt denies any h/o PMB . Her bowels are regular and she is voiding without difficulty. D/W patient need for D&C/ Hysteroscopy which will need to be done at a hospital with cardiac availability.  Pt prefers to have it done at [facility]. Referral sent to Dr. Sudarshan Lagunas  HPI:  1421 General Carolyn Townsend is a 79 y.o. female [id number]       OB History    Para Term  AB Living   4 4 4 0 0 4   SAB IAB Ectopic Molar Multiple Live Births   0 0 0 0 0 0      # Outcome Date GA Lbr Juanito/2nd Weight Sex Delivery Anes PTL Lv   4 Term     F Vag-Spont      3 Term     M Vag-Spont      2 Term     M Vag-Spont      1 Term     F Vag-Spont          Past Medical History:   Diagnosis Date    Arthritis     degenerative    Cervical spine degeneration     Chronic renal insufficiency, stage II (mild)     Colon polyps     history of     Constipation     Cystocele     Diverticulitis of colon     history of     Dyspepsia     history of     GERD (gastroesophageal reflux disease)     GI bleed 2016    Head ache     Hyperlipidemia     Hypertension     Multiple melanocytic nevi     Mumps     Over weight     Panic attacks     history of     Paroxysmal atrial fibrillation (HCC)     Positive cardiac stress test 03/2021    Shingles     history of     Tachycardia     episodic       Past Surgical History:   Procedure Laterality Date   [address], [facility], [facility]1983    BLADDER REPAIR  05/06/2009    Anterior repair with Prolift mesh.  BREAST SURGERY Right 1994    biopsy, benign    CARDIAC CATHETERIZATION  11/28/2000    CARDIAC CATHETERIZATION  03/19/2021    CARDIOVERSION  06/11/2021    COLECTOMY  10/2006    COLONOSCOPY  2006, 2009    x2    COLONOSCOPY  2016    Dr. Debby Crouch.   Repeat 2019    COLONOSCOPY  03/2019    Cecal Polyp  [facility]    MARILU STEROTACTIC LOC BREAST BIOPSY RIGHT Right 7/12/2021    MARILU STEROTACTIC LOC BREAST BIOPSY RIGHT 7/12/2021 Elver Shah., DO MD MAMMOGRAPHY    SC BIOPSY OF BREAST, INCISIONAL Right 1994    benign    SC PUNC/ASPIR BREAST CYST Right     unsure date    TRANSESOPHAGEAL ECHOCARDIOGRAM  06/11/2021    UTERINE FIBROID EMBOLIZATION  10/2006       Family History   Problem Relation Age of Onset    High Blood Pressure Mother     Emphysema Father     Osteoporosis Sister     Heart Disease Sister         CHF       Social History     Socioeconomic History    Marital status:      Spouse name: Not on file    Number of children: Not on file    Years of education: Not on file    Highest education level: Not on file   Occupational History    Not on file   Tobacco Use    Smoking status: Never Smoker    Smokeless tobacco: Never Used    Tobacco comment: shivani rrt 7/15/17   Vaping Use    Vaping Use: Never used   Substance and Sexual Activity    Alcohol use: No    Drug use: No    Sexual activity: Yes     Partners: Male   Other Topics Concern    Not on file   Social History Narrative    Not on file     Social Determinants of Health     Financial Resource Strain: Low Risk     Difficulty of Paying Living Expenses: Not hard at all   Food Insecurity: No Food Insecurity    Worried About Running Out of Food in the Last Year: Never true   [employer] of Food in the Last Year: Never true   Transportation Needs:     Lack of Transportation (Medical): Not on file    Lack of Transportation (Non-Medical): Not on file   Physical Activity:     Days of Exercise per Week: Not on file    Minutes of Exercise per Session: Not on file   Stress:     Feeling of Stress : Not on file   Social Connections:     Frequency of Communication with Friends and Family: Not on file    Frequency of Social Gatherings with Friends and Family: Not on file    Attends [employer] Services: Not on file    Active Member of [address] [place] [address] [employer] or Organizations: Not on file    Attends Club or Organization Meetings: Not on file    Marital Status: Not on file   Intimate Partner Violence:     Fear of Current or Ex-Partner: Not on file    Emotionally Abused: Not on file    Physically Abused: Not on file    Sexually Abused: Not on file   Housing Stability:     Unable to Pay for Housing in the Last Year: Not on file    Number of [place] in the Last Year: Not on file    Unstable Housing in the Last Year: Not on file         MEDICATIONS:  Current Outpatient Medications   Medication Sig Dispense Refill    metoprolol succinate (TOPROL XL) 50 MG extended release tablet take 1 tablet by mouth twice a day      potassium chloride (KLOR-CON M) 20 MEQ extended release tablet Take 20 mEq by mouth daily      amiodarone (CORDARONE) 200 MG tablet Take 1 tablet by mouth daily 90 tablet 3    furosemide (LASIX) 20 MG tablet Take 1 tablet by mouth daily 180 tablet 3    apixaban (ELIQUIS) 2.5 MG TABS tablet Take 1 tablet by mouth 2 times daily 180 tablet 3    simvastatin (ZOCOR) 20 MG tablet TAKE 1 TABLET EVERY EVENING 90 tablet 3    tamoxifen (NOLVADEX) 20 MG tablet Take 20 mg by mouth daily      acetaminophen (TYLENOL) 325 MG tablet Take 650 mg by mouth every 6 hours as needed for Pain      Cholecalciferol (VITAMIN D3) 1000 units CAPS Take by mouth Doubles her dose 3 days a week.  On M-W-F      Cyanocobalamin (B-12) 1000 MCG CAPS Take 1 tablet by mouth daily Indications: High Binding Capacity of Iron to Red Blood Cells       polyethylene glycol (GLYCOLAX) powder Take 17 g by mouth daily        No current facility-administered medications for this visit. ALLERGIES:  Allergies as of 04/20/2022 - Fully Reviewed 04/20/2022   Allergen Reaction Noted    Adhesive tape  07/01/2021    Codeine Other (See Comments) 04/30/2019    Morphine Other (See Comments) 03/09/2017    Other  04/18/2014         REVIEW OF SYSTEMS:       A minimum of an eleven point review of systems was completed. Review Of Systems (11 point):  Constitutional: No fever, chills or malaise; No weight change or fatigue  Head and Eyes: No vision, Headache, Dizziness or trauma in last 12 months  ENT ROS: No hearing, Tinnitis, sinus or taste problems  Hematological and Lymphatic ROS:No Lymphoma, Von Willebrand's, Hemophillia or Bleeding History  Psych ROS: No Depression, Homicidal thoughts,suicidal thoughts, or anxiety  Breast ROS: No prior breast abnormalities or lumps  Respiratory ROS: No SOB, Pneumoniae,Cough, or Pulmonary Embolism History  Cardiovascular ROS: No Chest Pain with Exertion, Palpitations, Syncope, Edema, Arrhythmia  Gastrointestinal ROS: No Indigestion, Heartburn, Nausea, vomiting, Diarrhea, Constipation,or Bowel Changes; No Bloody Stools or melena  Genito-Urinary ROS: No Dysuria, Hematuria or Nocturia. No Urinary Incontinence or Vaginal Discharge  Musculoskeletal ROS: No Arthralgia, Arthritis,Gout,Osteoporosis or Rheumatism  Neurological ROS: No CVA, Migraines, Epilepsy, Seizure Hx, or Limb Weakness  Dermatological ROS: No Rash, Itching, Hives, Mole Changes or Cancer          Blood pressure 132/60, pulse 70, height 5' 2\" (1.575 m), weight 186 lb 3.2 oz (84.5 kg), not currently breastfeeding. Abdomen: Soft non-tender; good bowel sounds. No guarding, rebound or rigidity.  No CVA tenderness bilaterally. Extremities: No calf tenderness, DTR 2/4, and No edema bilaterally    Pelvic: Exam deferred. Diagnostics:  US PELVIS COMPLETE    Result Date: 3/22/2022  EXAMINATION: PELVIC ULTRASOUND 3/22/2022 TECHNIQUE: Transabdominal pelvic ultrasound was performed. COMPARISON: CTA abdomen and pelvis February 26, 2022. HISTORY: ORDERING SYSTEM PROVIDED HISTORY: Enlarged uterus TECHNOLOGIST PROVIDED HISTORY: enlated uterus Reason for Exam: enlarged uterus on CT Relevant Medical/Surgical History: tamoxifin x 1 year FINDINGS: Measurements: Uterus: 13.6 x 10.3 x 8.5 cm Right Ovary:Not measured. Left Ovary: Not measured. Ultrasound Findings: Uterus: Enlarged without measurable fibroid. Endometrial stripe: Endometrial canal appears fluid-filled with a masslike area of soft tissue  measuring approximately 5.3 x 4.8 x 3.8 cm. No internal color Doppler vascularity is seen. Right Ovary: Not visualized. No adnexal mass. Left Ovary:  Not visualized. No adnexal mass. Free Fluid: No evidence of free fluid. Endometrial canal is  fluid-filled with a masslike area of soft tissue measuring approximately 5.3 x 4.8 x 3.8 cm.   Given the history, endometrial malignancy cannot be excluded and tissue sampling is recommended RECOMMENDATIONS: Unavailable       Lab Results:  Results for orders placed or performed during the hospital encounter of 03/30/22   TSH with Reflex   Result Value Ref Range    TSH 3.25 0.30 - 5.00 uIU/mL   Lipid Panel   Result Value Ref Range    Cholesterol 190 <200 mg/dL    HDL 77 >40 mg/dL    LDL Cholesterol 95 0 - 130 mg/dL    Chol/HDL Ratio 2.5 <5    Triglycerides 91 <150 mg/dL   Comprehensive Metabolic Panel   Result Value Ref Range    Glucose 105 (H) 70 - 99 mg/dL    BUN 23 8 - 23 mg/dL    CREATININE 1.62 (H) 0.50 - 0.90 mg/dL    Bun/Cre Ratio 14 9 - 20    Calcium 9.5 8.6 - 10.4 mg/dL    Sodium 142 135 - 144 mmol/L    Potassium 4.7 3.7 - 5.3 mmol/L    Chloride 105 98 - 107 mmol/L    CO2 28 20 - 31 mmol/L    Anion Gap 9 9 - 17 mmol/L    Alkaline Phosphatase 58 35 - 104 U/L    ALT 19 5 - 33 U/L    AST 24 <32 U/L    Total Bilirubin 0.68 0.3 - 1.2 mg/dL    Total Protein 6.2 (L) 6.4 - 8.3 g/dL    Albumin 3.9 3.5 - 5.2 g/dL    Albumin/Globulin Ratio 1.7 1.0 - 2.5    GFR Non-African American 30 (L) >60 mL/min    GFR  37 (L) >60 mL/min    GFR Comment               Assessment:   Diagnosis Orders   1. Endometrial thickening on ultrasound     2. Mass of uterus     3. Uterine leiomyoma, unspecified location       Patient Active Problem List    Diagnosis Date Noted    Chronic renal disease, stage III ([facility] [address] [address] [562142] 04/20/2022     Priority: Medium    Osteopenia 07/09/2020    Chronic kidney disease, stage III (moderate) ([address] 12/07/2018    Small bowel obstruction ([address] 03/10/2017    Non morbid obesity due to excess calories 12/06/2016    AVM (arteriovenous malformation) of colon 06/06/2016    Hyperlipidemia 05/20/2014    Arthritis 05/20/2014    Colon polyps 05/20/2014    Constipation 05/20/2014    Cervical spine degeneration 05/20/2014    Essential hypertension 01/03/2014    Back pain 01/03/2014    Atrial fibrillation ([address] 08/08/2013           PLAN:  Return in about 1 year (around 4/20/2023) for Annual Exam.  Referral to Dr. Marlene Malin at [facility] for D&C/ Hysteroscopy  Repeat Annual every 1 year  Cervical Cytology Evaluation begins at 23years old. If Negative Cytology, Follow-up screening per current guidelines. Counseled on preventative health maintenance follow-up. The patient, Hilton Briones is a 79 y.o. female, was seen with a total time spent of 20 minutes for the visit on this date of service by the E/M provider. The time component had both face to face and non face to face time spent in determining the total time component.   Counseling and education regarding her diagnosis listed below and her options regarding those diagnoses were also included in determining her time component. Diagnosis Orders   1. Endometrial thickening on ultrasound     2. Mass of uterus     3. Uterine leiomyoma, unspecified location          The patient had her preventative health maintenance recommendations and follow-up reviewed with her at the completion of her visit.

## 2022-05-06 ENCOUNTER — TELEPHONE (OUTPATIENT)
Dept: FAMILY MEDICINE CLINIC | Age: 82
End: 2022-05-06

## 2022-05-06 NOTE — TELEPHONE ENCOUNTER
Mary Isidro from [facility] wants to know if it is ok for patient to hold Eliquis 2 days before surgery scheduled on 6/13/2022? We are to call her back at [phone].

## 2022-07-19 ENCOUNTER — OFFICE VISIT (OUTPATIENT)
Dept: NEPHROLOGY | Age: 82
End: 2022-07-19
Payer: MEDICARE

## 2022-07-19 VITALS
DIASTOLIC BLOOD PRESSURE: 60 MMHG | HEART RATE: 60 BPM | SYSTOLIC BLOOD PRESSURE: 110 MMHG | WEIGHT: 183 LBS | BODY MASS INDEX: 33.68 KG/M2 | HEIGHT: 62 IN

## 2022-07-19 DIAGNOSIS — I10 ESSENTIAL HYPERTENSION: ICD-10-CM

## 2022-07-19 DIAGNOSIS — N18.32 CHRONIC KIDNEY DISEASE, STAGE 3B (HCC): ICD-10-CM

## 2022-07-19 DIAGNOSIS — N25.81 SECONDARY HYPERPARATHYROIDISM (HCC): ICD-10-CM

## 2022-07-19 DIAGNOSIS — D47.2 MONOCLONAL GAMMOPATHY: Primary | ICD-10-CM

## 2022-07-19 PROCEDURE — 1090F PRES/ABSN URINE INCON ASSESS: CPT | Performed by: INTERNAL MEDICINE

## 2022-07-19 PROCEDURE — 1123F ACP DISCUSS/DSCN MKR DOCD: CPT | Performed by: INTERNAL MEDICINE

## 2022-07-19 PROCEDURE — 99212 OFFICE O/P EST SF 10 MIN: CPT

## 2022-07-19 PROCEDURE — 99213 OFFICE O/P EST LOW 20 MIN: CPT | Performed by: INTERNAL MEDICINE

## 2022-07-19 PROCEDURE — G8399 PT W/DXA RESULTS DOCUMENT: HCPCS | Performed by: INTERNAL MEDICINE

## 2022-07-19 PROCEDURE — 1036F TOBACCO NON-USER: CPT | Performed by: INTERNAL MEDICINE

## 2022-07-19 PROCEDURE — G8427 DOCREV CUR MEDS BY ELIG CLIN: HCPCS | Performed by: INTERNAL MEDICINE

## 2022-07-19 PROCEDURE — G8417 CALC BMI ABV UP PARAM F/U: HCPCS | Performed by: INTERNAL MEDICINE

## 2022-07-19 RX ORDER — ANASTROZOLE 1 MG/1
1 TABLET ORAL DAILY
COMMUNITY
Start: 2022-06-28

## 2022-07-19 RX ORDER — CALCIUM CARBONATE 500(1250)
500 TABLET ORAL 2 TIMES DAILY
COMMUNITY

## 2022-07-19 NOTE — PROGRESS NOTES
Renal Progress Note    Patient :  Elisa Diaz; 79 y.o. MRN# [id number]    Reason for Consult:     Asked by Melanie Bradley NP to see for KELLEY/Elevated Creatinine. History of Present Illness:     Elisa Diaz; 79 y.o. female with past medical history as mentioned below. The patient comes back in 6 month return visit. She has chronic kidney disease stage IIIB. No chest pain or shortness of breath or nausea or vomiting or swelling of the extremities. 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 We have been monitoring the situation. We will draw a repeat serum protein immunofixation today and if it is stable we will likely just continue to monitor. If there is significant worsening, we will refer to hematology. The patient has no history of protein in the air and as documented below. The patient and her  was in the room today understand the plan from the monoclonal gammopathy standpoint. Labs from 6/24/2022 shows CBC with differential within normal limits with hemoglobin 12.8. from 6/6/2022 a CMP showed BUN 18 creatinine 1.47 with calcium 9.3, sodium 140, potassium 4.2, chloride 105 and CO2 25. Albumin was 3.9 and liver tests function tests were within normal limits. Estimated GFR was documented at 35 mL/m. Interestingly, she has a normal random urine protein to creatinine ratio, normal albumin, normal albumin to globulin ratio and a previously normal CBC done in July 2021. Renal ultrasound shows smallish kidneys but no other significant abnormalities. She has mild secondary hyperparathyroidism with intact PTH level of 112, not requiring any treatment.        She has history of essential hypertension for many years, degenerative arthritis, known history of chronic kidney disease stage IIIb for at least a couple of years, likely more, constipation, colonic polyps, paroxysmal atrial fibrillation although now it appears to be persistent as the patient had a relatively recent cardioversion that did not get her out of A. fib. There is some plans apparently for possible ablation. She does also have cardiomyopathy with LVEF of 50% with moderate MR and moderate AI from a JOANNA from 2021. She said she did have a cardiac catheterization in the summer  that did not show any critical CAD to her knowledge and she did not require any stents. She also has a history of cystocele, diverticulitis, GI bleed, mumps, panic attacks, shingles, tachycardia. She also recently had a partial mastectomy for breast cancer and is on tamoxifen. She did not receive radiation or other chemotherapy. Apparently, the cancer was contained, according to the patient. The patient had her relatively dilatation and curettage 2022 and biopsy was taken. Her mother  of kidney failure but did not have dialysis. She says her sister has some kidney disease but not on dialysis. No history of dialysis or transplant in the family. Family history of heart disease, emphysema, osteoporosis and hypertension. Allergies to codeine, adhesive tape, morphine and bee stings. Patient is . She is a never smoker. Medications are reviewed. She used to be on amlodipine but that was discontinued because of hypotension. JOANNA on 2021 showed LVEF 50%, mild to moderate MR, moderate AI no valvular vegetations. No history of recent contrast exposure, No h/o prolonged NSAIDs use in the past and she does occasionally use Tylenol if needed, No h/o nephrolithiasis, No recent skin rashes or arthralgias, No hematuria or pyuria noticed in the recent past. Doesn't report any reduction in the urine output recently. Non report of any obstructive urinary symptoms (urgency, frequency, weak stream, straining while urination). No h/o recurrent UTIs in the past.  She denies any not urea.   She does get edema at times and has been on Lasix 20 mg oral daily. Past History/Allergies? Social History:     Past Medical History:   Diagnosis Date    Arthritis     degenerative    Cervical spine degeneration     Chronic renal insufficiency, stage II (mild)     Colon polyps     history of     Constipation     Cystocele     Diverticulitis of colon     history of     Dyspepsia     history of     GERD (gastroesophageal reflux disease)     GI bleed Jan 2016    Head ache     Hyperlipidemia     Hypertension     Multiple melanocytic nevi     Mumps     Over weight     Panic attacks     history of     Paroxysmal atrial fibrillation (HCC)     Positive cardiac stress test 03/2021    Shingles     history of     Tachycardia     episodic       Allergies   Allergen Reactions    Adhesive Tape      rash    Codeine Other (See Comments)     nightmares    Morphine Other (See Comments)     Nightmares      Other      Bee stings - swelling       Social History     Socioeconomic History    Marital status:      Spouse name: Not on file    Number of children: Not on file    Years of education: Not on file    Highest education level: Not on file   Occupational History    Not on file   Tobacco Use    Smoking status: Never    Smokeless tobacco: Never    Tobacco comments:     shivani iglesias 7/15/17   Vaping Use    Vaping Use: Never used   Substance and Sexual Activity    Alcohol use: No    Drug use: No    Sexual activity: Yes     Partners: Male   Other Topics Concern    Not on file   Social History Narrative    Not on file     Social Determinants of Health     Financial Resource Strain: Not on file   Food Insecurity: Not on file   Transportation Needs: Not on file   Physical Activity: Not on file   Stress: Not on file   Social Connections: Not on file   Intimate Partner Violence: Not on file   Housing Stability: Not on file       Family History:        Family History   Problem Relation Age of Onset    High Blood Pressure Mother     Emphysema Father     Osteoporosis Sister Heart Disease Sister         CHF       Outpatient Medications:     Not in a hospital admission. Review of Systems:     Constitutional: No fever, no chills, no lethargy, no weakness. No history of paraprotein disease, leukemia or lymphoma although she does have an elevated serum free light chain ratio on serologies just drawn in December 2021  HEENT:  No headache, otalgia, itchy eyes, nasal discharge or sore throat. Cardiac:  No chest pain, dyspnea, orthopnea or PND. Chest:   No cough, phlegm or wheezing. Abdomen:  No abdominal pain, nausea or vomiting. Neuro:   No focal weakness, abnormal movements or seizure like activity. Skin:   No rashes, no itching. No history of vasculitides  :   No hematuria, no pyuria, no dysuria, no flank pain. Extremities:  No swelling on Lasix and no history of connective tissue disease  ROS was otherwise negative except as mentioned in the [address] Vital Signs:     Vitals:    07/19/22 1008   BP: 110/60   Pulse: 60     Wt Readings from Last 2 Encounters:   07/19/22 183 lb (83 kg)   04/20/22 186 lb 3.2 oz (84.5 kg)       Physical Examination:     General:  AAO x 3, speaking in full sentences, no accessory muscle use. HEENT: Atraumatic, normocephalic, no throat congestion, moist mucosa. Eyes:   Normal conjunctiva, EOMI, no scleral icterus  Neck:   No JVD, midline trachea and no accessory muscle use  Chest:   Bilateral vesicular breath sounds, no rales or wheezes. Cardiac:  Irregularly irregular rhythm with no rubs, positive murmurs  Abdomen: Soft, non-tender, no masses or organomegaly, BS audible. :   No flank tenderness. Neuro:  AAO x 3   SKIN:  No rashes, good skin turgor. Extremities:  No edema, palpable peripheral pulses, no calf tenderness. Labs:     No results for input(s): WBC, RBC, HGB, HCT, MCV, MCH, MCHC, RDW, PLT, MPV in the last 72 hours. BMP: No results for input(s): NA, K, CL, CO2, BUN, CREATININE, GLUCOSE, CALCIUM in the last 72 hours.    Phosphorus: No results for input(s): PHOS in the last 72 hours. Magnesium:  No results for input(s): MG in the last 72 hours. Albumin:  No results for input(s): LABALBU in the last 72 hours. BNP:    No results found for: BNP  ELIO:      Lab Results   Component Value Date/Time    ELIO NEGATIVE 12/13/2021 09:07 AM     SPEP:  Lab Results   Component Value Date/Time    PROT 6.2 03/30/2022 07:53 AM    ALBCAL 4.1 01/18/2022 09:32 AM    ALBPCT 64 01/18/2022 09:32 AM    LABALPH 0.2 01/18/2022 09:32 AM    LABALPH 0.7 01/18/2022 09:32 AM    A1PCT 3 01/18/2022 09:32 AM    A2PCT 11 01/18/2022 09:32 AM    LABBETA 0.6 01/18/2022 09:32 AM    BETAPCT 10 01/18/2022 09:32 AM    GAMGLOB 0.8 01/18/2022 09:32 AM    GGPCT 12 01/18/2022 09:32 AM    PATH ELECTRONICALLY SIGNED. Christin Jarquin M.D. 01/18/2022 09:32 AM    PATH ELECTRONICALLY SIGNED. Christin Jarquin M.D. 01/18/2022 09:32 AM     UPEP:   No results found for: LABPE  C3:     Lab Results   Component Value Date/Time    C3 118 12/13/2021 09:07 AM     C4:     Lab Results   Component Value Date/Time    C4 32 12/13/2021 09:07 AM     MPO ANCA:     Lab Results   Component Value Date/Time    MPO <0.3 12/13/2021 09:07 AM     PR3 ANCA:     Lab Results   Component Value Date/Time    PR3 <0.7 12/13/2021 09:07 AM     Anti-GBM:   No results found for: GBMABIGG  Hep BsAg:       No results found for: HEPBSAG  Hep C AB:        No results found for: HEPCAB     Labs in July 2021 showed CBC normal with hemoglobin 13.8, white blood cells 5.8 and platelets 107. BMP showed BUN 16 with creatinine 1.5 calcium 9.8 sodium 143 potassium 3.8 chloride 104, CO2 28 and estimated GFR of 33 mL/min. Labs are actually worse back in July 2020.     Labs from 12/13/2021 showed normal ELIO, normal C3, normal C4, ANCA negative, serum free light chain ratio elevated at 6.69, intact PTH level mildly elevated at 112, phosphorus low at 2.5, 25 vitamin D level normal at 42, uric acid 6, CBC not done because CBC in July was

## 2022-08-08 ENCOUNTER — HOSPITAL ENCOUNTER (OUTPATIENT)
Dept: LAB | Age: 82
Discharge: HOME | End: 2022-08-08
Payer: MEDICARE

## 2022-08-08 DIAGNOSIS — Z00.00 MEDICARE ANNUAL WELLNESS VISIT, SUBSEQUENT: ICD-10-CM

## 2022-08-08 DIAGNOSIS — I10 ESSENTIAL HYPERTENSION: ICD-10-CM

## 2022-08-08 DIAGNOSIS — D47.2 MONOCLONAL GAMMOPATHY: ICD-10-CM

## 2022-08-08 LAB
ALBUMIN SERPL-MCNC: 4 G/DL (ref 3.5–5.2)
ALBUMIN/GLOBULIN RATIO: 1.6 (ref 1–2.5)
ALP BLD-CCNC: 52 U/L (ref 35–104)
ALT SERPL-CCNC: 16 U/L (ref 5–33)
ANION GAP SERPL CALCULATED.3IONS-SCNC: 10 MMOL/L (ref 9–17)
AST SERPL-CCNC: 24 U/L
BILIRUB SERPL-MCNC: 0.53 MG/DL (ref 0.3–1.2)
BUN BLDV-MCNC: 20 MG/DL (ref 8–23)
BUN/CREAT BLD: 13 (ref 9–20)
CALCIUM SERPL-MCNC: 9.7 MG/DL (ref 8.6–10.4)
CHLORIDE BLD-SCNC: 106 MMOL/L (ref 98–107)
CHOLESTEROL/HDL RATIO: 2.2
CHOLESTEROL: 194 MG/DL
CO2: 28 MMOL/L (ref 20–31)
CREAT SERPL-MCNC: 1.55 MG/DL (ref 0.5–0.9)
GFR AFRICAN AMERICAN: 39 ML/MIN
GFR NON-AFRICAN AMERICAN: 32 ML/MIN
GFR SERPL CREATININE-BSD FRML MDRD: ABNORMAL ML/MIN/{1.73_M2}
GLUCOSE BLD-MCNC: 98 MG/DL (ref 70–99)
HDLC SERPL-MCNC: 87 MG/DL
LDL CHOLESTEROL: 90 MG/DL (ref 0–130)
POTASSIUM SERPL-SCNC: 4.2 MMOL/L (ref 3.7–5.3)
SODIUM BLD-SCNC: 144 MMOL/L (ref 135–144)
TOTAL PROTEIN: 6.5 G/DL (ref 6.4–8.3)
TRIGL SERPL-MCNC: 84 MG/DL

## 2022-08-08 PROCEDURE — 84165 PROTEIN E-PHORESIS SERUM: CPT

## 2022-08-08 PROCEDURE — 80053 COMPREHEN METABOLIC PANEL: CPT

## 2022-08-08 PROCEDURE — 80061 LIPID PANEL: CPT

## 2022-08-08 PROCEDURE — 84155 ASSAY OF PROTEIN SERUM: CPT

## 2022-08-08 PROCEDURE — 86334 IMMUNOFIX E-PHORESIS SERUM: CPT

## 2022-08-08 PROCEDURE — 36415 COLL VENOUS BLD VENIPUNCTURE: CPT

## 2022-08-10 LAB
ALBUMIN (CALCULATED): 4.1 G/DL (ref 3.2–5.2)
ALBUMIN PERCENT: 63 % (ref 45–65)
ALPHA 1 PERCENT: 3 % (ref 3–6)
ALPHA 2 PERCENT: 11 % (ref 6–13)
ALPHA-1-GLOBULIN: 0.2 G/DL (ref 0.1–0.4)
ALPHA-2-GLOBULIN: 0.7 G/DL (ref 0.5–0.9)
BETA GLOBULIN: 0.7 G/DL (ref 0.5–1.1)
BETA PERCENT: 11 % (ref 11–19)
GAMMA GLOBULIN %: 11 % (ref 9–20)
GAMMA GLOBULIN: 0.7 G/DL (ref 0.5–1.5)
PATHOLOGIST: NORMAL
PATHOLOGIST: NORMAL
PROTEIN ELECTROPHORESIS, SERUM: NORMAL
SERUM IFX INTERP: NORMAL
TOTAL PROT. SUM,%: 99 % (ref 98–102)
TOTAL PROT. SUM: 6.4 G/DL (ref 6.3–8.2)
TOTAL PROTEIN: 6.5 G/DL (ref 6.4–8.3)

## 2022-08-17 ENCOUNTER — OFFICE VISIT (OUTPATIENT)
Dept: FAMILY MEDICINE CLINIC | Age: 82
End: 2022-08-17
Payer: MEDICARE

## 2022-08-17 VITALS
OXYGEN SATURATION: 98 % | SYSTOLIC BLOOD PRESSURE: 128 MMHG | HEART RATE: 61 BPM | RESPIRATION RATE: 16 BRPM | DIASTOLIC BLOOD PRESSURE: 60 MMHG | BODY MASS INDEX: 33.75 KG/M2 | HEIGHT: 62 IN | WEIGHT: 183.4 LBS

## 2022-08-17 DIAGNOSIS — E78.2 MIXED HYPERLIPIDEMIA: ICD-10-CM

## 2022-08-17 DIAGNOSIS — I10 ESSENTIAL HYPERTENSION: ICD-10-CM

## 2022-08-17 DIAGNOSIS — N18.32 STAGE 3B CHRONIC KIDNEY DISEASE (HCC): Primary | ICD-10-CM

## 2022-08-17 PROCEDURE — G8427 DOCREV CUR MEDS BY ELIG CLIN: HCPCS | Performed by: FAMILY MEDICINE

## 2022-08-17 PROCEDURE — 99213 OFFICE O/P EST LOW 20 MIN: CPT | Performed by: FAMILY MEDICINE

## 2022-08-17 PROCEDURE — G8399 PT W/DXA RESULTS DOCUMENT: HCPCS | Performed by: FAMILY MEDICINE

## 2022-08-17 PROCEDURE — 99212 OFFICE O/P EST SF 10 MIN: CPT | Performed by: FAMILY MEDICINE

## 2022-08-17 PROCEDURE — 1036F TOBACCO NON-USER: CPT | Performed by: FAMILY MEDICINE

## 2022-08-17 PROCEDURE — 1123F ACP DISCUSS/DSCN MKR DOCD: CPT | Performed by: FAMILY MEDICINE

## 2022-08-17 PROCEDURE — G8417 CALC BMI ABV UP PARAM F/U: HCPCS | Performed by: FAMILY MEDICINE

## 2022-08-17 PROCEDURE — 1090F PRES/ABSN URINE INCON ASSESS: CPT | Performed by: FAMILY MEDICINE

## 2022-08-17 SDOH — ECONOMIC STABILITY: FOOD INSECURITY: WITHIN THE PAST 12 MONTHS, YOU WORRIED THAT YOUR FOOD WOULD RUN OUT BEFORE YOU GOT MONEY TO BUY MORE.: NEVER TRUE

## 2022-08-17 SDOH — ECONOMIC STABILITY: FOOD INSECURITY: WITHIN THE PAST 12 MONTHS, THE FOOD YOU BOUGHT JUST DIDN'T LAST AND YOU DIDN'T HAVE MONEY TO GET MORE.: NEVER TRUE

## 2022-08-17 ASSESSMENT — ENCOUNTER SYMPTOMS
ABDOMINAL PAIN: 0
CHOKING: 0
CHEST TIGHTNESS: 0
WHEEZING: 0
PHOTOPHOBIA: 0
COUGH: 0
SHORTNESS OF BREATH: 0

## 2022-08-17 ASSESSMENT — SOCIAL DETERMINANTS OF HEALTH (SDOH): HOW HARD IS IT FOR YOU TO PAY FOR THE VERY BASICS LIKE FOOD, HOUSING, MEDICAL CARE, AND HEATING?: NOT HARD AT ALL

## 2022-08-17 NOTE — PATIENT INSTRUCTIONS
Nutrition Health Education:    Keep hydrated, walk 30 minutes minimum 3 times weekly as tolerated. Diet should consist of low fat, low sodium and high fiber. Nutritious foods such as fruits (if you're not diabetic), vegetables, lean meats, lean dairy, whole grains such as brown rice, quinoa, and dry beans. [place] with small amounts of heart healthy extra virgin olive oil. Be watchful of any extra salt/sugar/seasoning to your food. You should eat no more than 2 grams or 2,000 mg of salt daily. Salt will raise your BP. Avoid regular/diet sodas, caffeine, energy drinks as these are full of artificial ingredients/sweeteners, sugar, salt and chemicals that spike insulin and are harmful to your health. Sugar intake increases metabolic disfunction, type 2 diabetes, insulin resistance, addictive food behavior and obesity. Avoid all processed foods, foods from boxes, cans, microwave meals as these contain high salt, sugar or fat content and not much nutrition. Get at least 8 hrs of sleep every night and turn off all electronics at least 1 hour before bedtime as these decreases melatonin production and increases wakefulness. If your cholesterol is high, no greasy, fried, fast or fatty foods. Decrease red meat intake. No butter, figueroa, lard or creams, no milk as these things clog your arteries and leads to heart attacks and death. If you smoke, smoking increases risk of lung disease, cancers, high BP, heart attack, stroke and death. Take your daily medications as prescribed and inform your family doctor if you are having any side effects or issues taking medications.      Elevated Cholesterol:  No greasy, fried, fast, fatty foods  No trans fats  Decreased red meat intake to once every 2 months  No butter, figueroa nor cream cheese, cheese  No egg yolk  NO milk  Decrease your cholesterol in diet       Reviewed blood work with pt cmp and lipids    Pt is under care of cardiology   Pt in under care or nephrology Dr. Suhail Graves who referred her to heme/onc Dr. Lawrence Gudino  Pt is under care of GYN    Fasting blood work and follow up in 4 months

## 2022-08-17 NOTE — PROGRESS NOTES
Name: [place]  DOS: 8/17/2022  MRN: [id number]      Subjective:  [place] is a 79 y.o. female being seen for   Chief Complaint   Patient presents with    Hypertension     Does not check bp at home but no ill side effects. Follow-up     No concerns, everything going well      Rash     Lower belly skin fold, uses a powder typically but is currently almost out of medicationl        Vitals:    08/17/22 1527   BP: (!) 118/56   Pulse: 61   Resp: 16   SpO2: 98%     Allergies   Allergen Reactions    Latex Rash    Adhesive Tape      rash    Codeine Other (See Comments)     nightmares    Morphine Other (See Comments)     Nightmares      Other      Bee stings - swelling     Past Medical History:   Diagnosis Date    Arthritis     degenerative    Cervical spine degeneration     Chronic renal insufficiency, stage II (mild)     Colon polyps     history of     Constipation     Cystocele     Diverticulitis of colon     history of     Dyspepsia     history of     GERD (gastroesophageal reflux disease)     GI bleed Jan 2016    Head ache     Hyperlipidemia     Hypertension     Multiple melanocytic nevi     Mumps     Over weight     Panic attacks     history of     Paroxysmal atrial fibrillation (HCC)     Positive cardiac stress test 03/2021    Shingles     history of     Tachycardia     episodic     Past Surgical History:   Procedure Laterality Date    Tekniikantie 8  05/06/2009    Anterior repair with Prolift mesh. BREAST SURGERY Right 1994    biopsy, benign    CARDIAC CATHETERIZATION  11/28/2000    CARDIAC CATHETERIZATION  03/19/2021    CARDIOVERSION  06/11/2021    COLECTOMY  10/2006    COLONOSCOPY  2006, 2009    x2    COLONOSCOPY  2016    Dr. Joseph Cooley.   Repeat 2019    COLONOSCOPY  03/2019    Cecal Polyp  Done at [facility]. [facility]    MARILU STEROTACTIC LOC BREAST BIOPSY RIGHT Right 7/12/2021    MARILU STEROTACTIC LOC BREAST BIOPSY RIGHT 7/12/2021 Aneta Boast., [facility] MAMMOGRAPHY    IA BIOPSY OF BREAST, INCISIONAL Right 1994    benign    IA PUNC/ASPIR BREAST CYST Right     unsure date    TRANSESOPHAGEAL ECHOCARDIOGRAM  06/11/2021    UTERINE FIBROID EMBOLIZATION  10/2006     Social History     Socioeconomic History    Marital status:      Spouse name: None    Number of children: None    Years of education: None    Highest education level: None   Tobacco Use    Smoking status: Never    Smokeless tobacco: Never    Tobacco comments:     shviani rrt 7/15/17   Vaping Use    Vaping Use: Never used   Substance and Sexual Activity    Alcohol use: No    Drug use: No    Sexual activity: Yes     Partners: Male     Social Determinants of Health     Financial Resource Strain: Low Risk     Difficulty of Paying Living Expenses: Not hard at all   Food Insecurity: No Food Insecurity    Worried About Running Out of Food in the Last Year: Never true    Ran Out of Food in the Last Year: Never true       Current Outpatient Medications   Medication Sig Dispense Refill    anastrozole (ARIMIDEX) 1 MG tablet Take 1 mg by mouth in the morning. calcium carbonate (OSCAL) 500 MG TABS tablet Take 500 mg by mouth in the morning and 500 mg before bedtime. metoprolol succinate (TOPROL XL) 50 MG extended release tablet take 1 tablet by mouth twice a day      potassium chloride (KLOR-CON M) 20 MEQ extended release tablet Take 20 mEq by mouth daily      furosemide (LASIX) 20 MG tablet Take 1 tablet by mouth daily 180 tablet 3    apixaban (ELIQUIS) 2.5 MG TABS tablet Take 1 tablet by mouth 2 times daily 180 tablet 3    simvastatin (ZOCOR) 20 MG tablet TAKE 1 TABLET EVERY EVENING 90 tablet 3    acetaminophen (TYLENOL) 325 MG tablet Take 650 mg by mouth every 6 hours as needed for Pain      Cholecalciferol (VITAMIN D3) 1000 units CAPS Take by mouth Doubles her dose 3 days a week.  On M-W-F      Cyanocobalamin (B-12) 1000 MCG CAPS Take 1 tablet by mouth daily Indications: High Binding Capacity of Iron to Red Blood Cells       polyethylene glycol (GLYCOLAX) powder Take 17 g by mouth daily        No current facility-administered medications for this visit. Objective:  Pt is here for a follow up. Upon further questioning she is under care of Dr. Tabitha Garcia who set her up for a pelvic ultrasound as she may have a uterine mass which he says is a fibroid per patient. Pt is waiting to hear form the hem/onc to make and appt. If she doesn't hear from them she will call dr Judith Carr to see who he referred her to to make an appt. Pt states she feels good today. Review of Systems   Constitutional:  Positive for fatigue (sometimes). Negative for unexpected weight change. Eyes:  Negative for photophobia and visual disturbance. Respiratory:  Negative for cough, choking, chest tightness, shortness of breath and wheezing. Cardiovascular:  Negative for chest pain, palpitations and leg swelling. Gastrointestinal:  Negative for abdominal pain. Genitourinary:  Positive for vaginal discharge (under care of GYN). Negative for difficulty urinating, hematuria, vaginal bleeding and vaginal pain. Skin:  Positive for rash (pelvic rash she is under care of GYN for that she gets a powder from him, which works). Negative for wound. Neurological:  Negative for dizziness, weakness, light-headedness, numbness and headaches. Hematological:  Negative for adenopathy. Does not bruise/bleed easily. Psychiatric/Behavioral:  Negative for agitation, confusion, decreased concentration and suicidal ideas. Physical Exam  Constitutional:       General: She is not in acute distress. Appearance: Normal appearance. She is obese. She is not ill-appearing, toxic-appearing or diaphoretic. HENT:      Head: Normocephalic and atraumatic. Right Ear: External ear normal.      Left Ear: External ear normal.      Nose: Nose normal. No congestion or rhinorrhea.       Mouth/Throat:      Mouth: Mucous membranes are moist. Pharynx: Oropharynx is clear. No oropharyngeal exudate or posterior oropharyngeal erythema. Eyes:      Extraocular Movements: Extraocular movements intact. Conjunctiva/sclera: Conjunctivae normal.      Pupils: Pupils are equal, round, and reactive to light. Cardiovascular:      Rate and Rhythm: Normal rate and regular rhythm. Pulses: Normal pulses. Heart sounds: Normal heart sounds. No murmur heard. Pulmonary:      Effort: Pulmonary effort is normal.      Breath sounds: Normal breath sounds. No wheezing, rhonchi or rales. Abdominal:      General: Abdomen is flat. Bowel sounds are normal. There is no distension. Palpations: Abdomen is soft. There is no mass. Tenderness: There is no abdominal tenderness. There is no right CVA tenderness, left CVA tenderness or guarding. Musculoskeletal:         General: Normal range of motion. Cervical back: Normal range of motion and neck supple. Right lower leg: No edema. Left lower leg: No edema. Lymphadenopathy:      Cervical: No cervical adenopathy. Skin:     General: Skin is warm. Capillary Refill: Capillary refill takes less than 2 seconds. Neurological:      General: No focal deficit present. Mental Status: She is alert and oriented to person, place, and time. Coordination: Coordination normal.      Gait: Gait normal.   Psychiatric:         Mood and Affect: Mood normal.         Behavior: Behavior normal.         Thought Content: Thought content normal.        Assessment:   Diagnosis Orders   1. Essential hypertension  Comprehensive Metabolic Panel      2. Mixed hyperlipidemia  Comprehensive Metabolic Panel    Lipid Panel      3.  Stage 3b chronic kidney disease ([facility] [address]  Comprehensive Metabolic Panel            Plan:  Orders Placed This Encounter   Procedures    Comprehensive Metabolic Panel     Standing Status:   Future     Standing Expiration Date:   12/31/2022    Lipid Panel     Standing Status:   Future Standing Expiration Date:   12/31/2022     Order Specific Question:   Is Patient Fasting?/# of Hours     Answer:   Yes           Patient Instructions   Nutrition Health Education:    Keep hydrated, walk 30 minutes minimum 3 times weekly as tolerated. Diet should consist of low fat, low sodium and high fiber. Nutritious foods such as fruits (if you're not diabetic), vegetables, lean meats, lean dairy, whole grains such as brown rice, quinoa, and dry beans. [place] with small amounts of heart healthy extra virgin olive oil. Be watchful of any extra salt/sugar/seasoning to your food. You should eat no more than 2 grams or 2,000 mg of salt daily. Salt will raise your BP. Avoid regular/diet sodas, caffeine, energy drinks as these are full of artificial ingredients/sweeteners, sugar, salt and chemicals that spike insulin and are harmful to your health. Sugar intake increases metabolic disfunction, type 2 diabetes, insulin resistance, addictive food behavior and obesity. Avoid all processed foods, foods from boxes, cans, microwave meals as these contain high salt, sugar or fat content and not much nutrition. Get at least 8 hrs of sleep every night and turn off all electronics at least 1 hour before bedtime as these decreases melatonin production and increases wakefulness. If your cholesterol is high, no greasy, fried, fast or fatty foods. Decrease red meat intake. No butter, figueroa, lard or creams, no milk as these things clog your arteries and leads to heart attacks and death. If you smoke, smoking increases risk of lung disease, cancers, high BP, heart attack, stroke and death. Take your daily medications as prescribed and inform your family doctor if you are having any side effects or issues taking medications.      Elevated Cholesterol:  No greasy, fried, fast, fatty foods  No trans fats  Decreased red meat intake to once every 2 months  No butter, figueroa nor cream cheese, cheese  No egg yolk  NO milk  Decrease your cholesterol in diet       Reviewed blood work with pt cmp and lipids    Pt is under care of cardiology   Pt in under care or nephrology Dr. Manuelito Roberts who referred her to heme/onc Dr. Kevin Monson  Pt is under care of GYN    Fasting blood work and follow up in 4 months       Return in about 4 months (around 12/17/2022) for REVIEW LABS.      Steven Paris DO

## 2022-08-29 RX ORDER — POTASSIUM CHLORIDE 20 MEQ/1
20 TABLET, EXTENDED RELEASE ORAL DAILY
Qty: 90 TABLET | Refills: 1 | Status: SHIPPED | OUTPATIENT
Start: 2022-08-29 | End: 2022-11-27

## 2022-08-29 NOTE — TELEPHONE ENCOUNTER
Thompson Quevedo is requesting a refill on the following medication(s):  Requested Prescriptions     Pending Prescriptions Disp Refills    potassium chloride (KLOR-CON M) 20 MEQ extended release tablet [Pharmacy Med Name: POTASSIUM CL ER 20 MEQ TABLET] 90 tablet      Sig: take 1 tablet by mouth once daily       Last Visit Date (If Applicable):  6/26/8414    Next Visit Date:    12/19/2022

## 2022-09-06 ENCOUNTER — OFFICE VISIT (OUTPATIENT)
Dept: ONCOLOGY | Age: 82
End: 2022-09-06
Payer: MEDICARE

## 2022-09-06 ENCOUNTER — HOSPITAL ENCOUNTER (OUTPATIENT)
Dept: LAB | Age: 82
Discharge: HOME | End: 2022-09-06
Payer: MEDICARE

## 2022-09-06 VITALS
WEIGHT: 182 LBS | OXYGEN SATURATION: 96 % | SYSTOLIC BLOOD PRESSURE: 118 MMHG | DIASTOLIC BLOOD PRESSURE: 64 MMHG | BODY MASS INDEX: 33.49 KG/M2 | HEIGHT: 62 IN | TEMPERATURE: 97.5 F | HEART RATE: 61 BPM

## 2022-09-06 DIAGNOSIS — N18.9 CHRONIC KIDNEY DISEASE, UNSPECIFIED CKD STAGE: ICD-10-CM

## 2022-09-06 DIAGNOSIS — D64.9 ANEMIA, UNSPECIFIED TYPE: ICD-10-CM

## 2022-09-06 DIAGNOSIS — D69.6 THROMBOCYTOPENIA (HCC): ICD-10-CM

## 2022-09-06 DIAGNOSIS — D89.2 PARAPROTEINEMIA: Primary | ICD-10-CM

## 2022-09-06 DIAGNOSIS — D89.2 PARAPROTEINEMIA: ICD-10-CM

## 2022-09-06 LAB
ABSOLUTE EOS #: 0.07 K/UL (ref 0–0.44)
ABSOLUTE IMMATURE GRANULOCYTE: <0.03 K/UL (ref 0–0.3)
ABSOLUTE LYMPH #: 1.78 K/UL (ref 1.1–3.7)
ABSOLUTE MONO #: 0.49 K/UL (ref 0.1–1.2)
BASOPHILS # BLD: 1 % (ref 0–2)
BASOPHILS ABSOLUTE: 0.04 K/UL (ref 0–0.2)
EOSINOPHILS RELATIVE PERCENT: 1 % (ref 1–4)
HCT VFR BLD CALC: 41.1 % (ref 36.3–47.1)
HEMOGLOBIN: 13.7 G/DL (ref 11.9–15.1)
IMMATURE GRANULOCYTES: 0 %
LYMPHOCYTES # BLD: 30 % (ref 24–43)
MCH RBC QN AUTO: 32.5 PG (ref 25.2–33.5)
MCHC RBC AUTO-ENTMCNC: 33.3 G/DL (ref 25.2–33.5)
MCV RBC AUTO: 97.4 FL (ref 82.6–102.9)
MONOCYTES # BLD: 8 % (ref 3–12)
NRBC AUTOMATED: 0 PER 100 WBC
PDW BLD-RTO: 12.2 % (ref 11.8–14.4)
PLATELET # BLD: 160 K/UL (ref 138–453)
PMV BLD AUTO: 11.3 FL (ref 8.1–13.5)
RBC # BLD: 4.22 M/UL (ref 3.95–5.11)
SEG NEUTROPHILS: 60 % (ref 36–65)
SEGMENTED NEUTROPHILS ABSOLUTE COUNT: 3.54 K/UL (ref 1.5–8.1)
WBC # BLD: 5.9 K/UL (ref 3.5–11.3)

## 2022-09-06 PROCEDURE — G8427 DOCREV CUR MEDS BY ELIG CLIN: HCPCS | Performed by: INTERNAL MEDICINE

## 2022-09-06 PROCEDURE — 83883 ASSAY NEPHELOMETRY NOT SPEC: CPT

## 2022-09-06 PROCEDURE — 85025 COMPLETE CBC W/AUTO DIFF WBC: CPT

## 2022-09-06 PROCEDURE — 82746 ASSAY OF FOLIC ACID SERUM: CPT

## 2022-09-06 PROCEDURE — 82728 ASSAY OF FERRITIN: CPT

## 2022-09-06 PROCEDURE — 36415 COLL VENOUS BLD VENIPUNCTURE: CPT

## 2022-09-06 PROCEDURE — 99203 OFFICE O/P NEW LOW 30 MIN: CPT | Performed by: INTERNAL MEDICINE

## 2022-09-06 PROCEDURE — G8417 CALC BMI ABV UP PARAM F/U: HCPCS | Performed by: INTERNAL MEDICINE

## 2022-09-06 PROCEDURE — 83550 IRON BINDING TEST: CPT

## 2022-09-06 PROCEDURE — 99204 OFFICE O/P NEW MOD 45 MIN: CPT | Performed by: INTERNAL MEDICINE

## 2022-09-06 PROCEDURE — 83540 ASSAY OF IRON: CPT

## 2022-09-06 PROCEDURE — 82607 VITAMIN B-12: CPT

## 2022-09-06 PROCEDURE — 1090F PRES/ABSN URINE INCON ASSESS: CPT | Performed by: INTERNAL MEDICINE

## 2022-09-06 NOTE — PROGRESS NOTES
[address] [facility]                                                                                                                  9/6/2022  MRN:   [id number]  YOB: 1940  PCP:                           Katelyn Lara DO  Referring Physician: Fermin  Treating Physician Name: Zelda Foss MD      Reason for consultation:  Chief Complaint   Patient presents with    New Patient     Monoclonal gammopathy  referred by Dr. Milka Hanna   Patient presents to the clinic to establish care and for further work-up and evaluation of paraproteinemia    Current problems:  IgA kappa monoclonal paraproteinemia, likely MGUS  History of breast cancer on Arimidex  CKD likely secondary to hypertension and nephrosclerosis  Anemia  Thrombocytopenia    Summary of Case/History:    [address] [facility] a 79 y. o.female is a patient with IgG kappa paraproteinemia presents to the clinic to establish care and for further work-up and evaluation. Patient follows up with Dr. Milka Hanna from nephrology for CKD. Patient CKD is thought to be secondary to hypertension and nephrosclerosis. Patient work-up for CKD including paraproteinemia profile came back positive. Patient's most recent Immunofixation studies show M protein of 0.08 g/dL IgA kappa restricted. Patient free light chain ratio done back in December 2021 was 6.6. We do not have a more recent free light chain ratio. Patient does have mild anemia with hemoglobin 11.3 noted back in February 2022. Patient's MCV was 98 at that time. Patient was also noted to be thrombocytopenic at that time. He did not have a repeat CBC. Patient does not have hypercalcemia. Patient also has a diagnosis of breast cancer for which she is on Arimidex and follows up with oncologist at [facility].     Past Medical History:   Past Medical History:   Diagnosis Date    Arthritis     degenerative    Cervical spine degeneration     Chronic renal insufficiency, stage II (mild)     Colon polyps     history of     Constipation     Cystocele     Diverticulitis of colon     history of     Dyspepsia     history of     GERD (gastroesophageal reflux disease)     GI bleed Jan 2016    Head ache     Hyperlipidemia     Hypertension     Multiple melanocytic nevi     Mumps     Over weight     Panic attacks     history of     Paroxysmal atrial fibrillation (HCC)     Positive cardiac stress test 03/2021    Shingles     history of     Tachycardia     episodic       Past Surgical History:     Past Surgical History:   Procedure Laterality Date    Tekniikantie 8  05/06/2009    Anterior repair with Prolift mesh. BREAST SURGERY Right 1994    biopsy, benign    CARDIAC CATHETERIZATION  11/28/2000    CARDIAC CATHETERIZATION  03/19/2021    CARDIOVERSION  06/11/2021    COLECTOMY  10/2006    COLONOSCOPY  2006, 2009    x2    COLONOSCOPY  2016    Dr. Elma Cameron.   Repeat 2019    COLONOSCOPY  03/2019    Cecal Polyp  Done at [place]. [facility]    MARILU STEROTACTIC LOC BREAST BIOPSY RIGHT Right 7/12/2021    MARILU STEROTACTIC LOC BREAST BIOPSY RIGHT 7/12/2021 Sandeep Smith DO [facility] MAMMOGRAPHY    SD BIOPSY OF BREAST, INCISIONAL Right 1994    benign    SD PUNC/ASPIR BREAST CYST Right     unsure date    TRANSESOPHAGEAL ECHOCARDIOGRAM  06/11/2021    UTERINE FIBROID EMBOLIZATION  10/2006       Patient Family Social History:    Social History     Socioeconomic History    Marital status:      Spouse name: None    Number of children: None    Years of education: None    Highest education level: None   Tobacco Use    Smoking status: Never    Smokeless tobacco: Never    Tobacco comments:     aneudyalcarlito rrt 7/15/17   Vaping Use    Vaping Use: Never used   Substance and Sexual Activity    Alcohol use: No    Drug use: No    Sexual activity: Yes     Partners: Male     Social Determinants of Health     Financial Resource Strain: Low Risk     Difficulty of Paying Living Expenses: Not hard at all   Food Insecurity: No Food Insecurity    Worried About Running Out of Food in the Last Year: Never true    Ran Out of Food in the Last Year: Never true     Family History   Problem Relation Age of Onset    High Blood Pressure Mother     Emphysema Father     Osteoporosis Sister     Heart Disease Sister         CHF     Current Medications:     Current Outpatient Medications   Medication Sig Dispense Refill    potassium chloride (KLOR-CON M) 20 MEQ extended release tablet Take 1 tablet by mouth daily 90 tablet 1    anastrozole (ARIMIDEX) 1 MG tablet Take 1 mg by mouth in the morning. calcium carbonate (OSCAL) 500 MG TABS tablet Take 500 mg by mouth in the morning and 500 mg before bedtime. metoprolol succinate (TOPROL XL) 50 MG extended release tablet daily      furosemide (LASIX) 20 MG tablet Take 1 tablet by mouth daily 180 tablet 3    apixaban (ELIQUIS) 2.5 MG TABS tablet Take 1 tablet by mouth 2 times daily 180 tablet 3    simvastatin (ZOCOR) 20 MG tablet TAKE 1 TABLET EVERY EVENING 90 tablet 3    acetaminophen (TYLENOL) 325 MG tablet Take 650 mg by mouth every 6 hours as needed for Pain      Cholecalciferol (VITAMIN D3) 1000 units CAPS Take by mouth in the morning and at bedtime      Cyanocobalamin (B-12) 1000 MCG CAPS Take 1 tablet by mouth daily Indications: High Binding Capacity of Iron to Red Blood Cells       polyethylene glycol (GLYCOLAX) powder Take 17 g by mouth daily        No current facility-administered medications for this visit. Allergies:   Latex, Adhesive tape, Codeine, Morphine, and Other    Review of Systems:    Constitutional: No fever or chills.  No night sweats, no weight loss   Eyes: No eye discharge, double vision, or eye pain   HEENT: negative for sore mouth, sore throat, hoarseness and voice change   Respiratory: negative for cough , sputum, dyspnea, wheezing, hemoptysis, chest pain   Cardiovascular: negative for chest pain, dyspnea, palpitations, orthopnea, PND   Gastrointestinal: negative for nausea, vomiting, diarrhea, constipation, abdominal pain, Dysphagia, hematemesis and hematochezia   Genitourinary: negative for frequency, dysuria, nocturia, urinary incontinence, and hematuria   Integument: negative for rash, skin lesions, bruises. Hematologic/Lymphatic: negative for easy bruising, bleeding, lymphadenopathy, or petechiae   Endocrine: negative for heat or cold intolerance,weight changes, change in bowel habits and hair loss   Musculoskeletal: negative for myalgias, arthralgias, pain, joint swelling,and bone pain   Neurological: negative for headaches, dizziness, seizures, weakness, numbness        Physical Exam:    Vitals: /64 (Site: Right Upper Arm, Position: Sitting, Cuff Size: Large Adult)   Pulse 61   Temp 97.5 °F (36.4 °C)   Ht 5' 2\" (1.575 m)   Wt 182 lb (82.6 kg)   LMP  (LMP Unknown)   SpO2 96%   BMI 33.29 kg/m²   General appearance - well appearing, no in pain or distress  Mental status - AAO X3  Eyes - pupils equal and reactive, extraocular eye movements intact  Mouth - mucous membranes moist, pharynx normal without lesions  Neck - supple, no significant adenopathy  Lymphatics - no palpable lymphadenopathy, no hepatosplenomegaly  Chest - clear to auscultation, no wheezes, rales or rhonchi, symmetric air entry  Heart - normal rate, regular rhythm, normal S1, S2, no murmurs  Abdomen - soft, nontender, nondistended, no masses or organomegaly  Neurological - alert, oriented, normal speech, no focal findings or movement disorder noted  Extremities - peripheral pulses normal, no pedal edema, no clubbing or cyanosis  Skin - normal coloration and turgor, no rashes, no suspicious skin lesions noted       DATA:    Results for orders placed or performed during the hospital encounter of 08/08/22   Immunofixation Serum Profile   Result Value Ref Range    Serum IFX Interp       A ZONE OF RESTRICTION IS PRESENT IN THE BETA GLOBULIN REGION.   CONFIRMED BY IMMUNOFIXATION TO BE MONOCLONAL    Pathologist ELECTRONICALLY SIGNED. Rui Bess M.D. Electrophoresis Protein, Serum   Result Value Ref Range    Total Protein 6.5 6.4 - 8.3 g/dL    Albumin (calculated) 4.1 3.2 - 5.2 g/dL    Albumin % 63 45 - 65 %    Alpha-1-Globulin 0.2 0.1 - 0.4 g/dL    Alpha 1 % 3 3 - 6 %    Alpha-2-Globulin 0.7 0.5 - 0.9 g/dL    Alpha 2 % 11 6 - 13 %    Beta Globulin 0.7 0.5 - 1.1 g/dL    Beta Percent 11 11 - 19 %    Gamma Globulin 0.7 0.5 - 1.5 g/dL    Gamma Globulin % 11 9 - 20 %    Total Prot. Sum 6.4 6.3 - 8.2 g/dL    Total Prot. Sum,% 99 98 - 102 %    Protein Electrophoresis, Serum       A ZONE OF RESTRICTION IS PRESENT IN THE BETA GLOBULIN REGION. CONFIRMED BY IMMUNOFIXATION TO BE MONOCLONAL    Pathologist ELECTRONICALLY SIGNED. Rui Bess M.D. Impression:  IgA kappa monoclonal paraproteinemia, likely MGUS  History of breast cancer on Arimidex  CKD likely secondary to hypertension and nephrosclerosis  Anemia  Thrombocytopenia    Plan:  I had a detailed discussion with the patient and personally went over results of lab work-up imaging studies and other relevant clinical data. Reviewed available medical records as well as records from outside facility  Discussed differential diagnosis of paraproteinemia which can be due to plasma cell dyscrasia, lymphoproliferative disorders as well as reactive in nature. Patient M protein has been relatively stable since diagnosis. I will repeat patient's free light chain ratio. We will repeat patient's CBC today if patient continues to be anemic or thrombocytopenic will need further evaluation to ensure cytopenias are not due to the paraproteinemia. No plans for bone marrow biopsy at this point. Patient already has a established care with oncology in [place]. Patient will be following up with her established oncologist there. We will be happy to see the patient in future as needed.   Patient will continue to get further treatment for breast cancer through her oncologist.  Patient and her  had multiple questions answered the best my ability. Adendum :    The patient's repeat CBC shows hemoglobin and platelet count to be within normal range. Patient's free light chain ratio is now 11. Patient is scheduled to follow-up with her oncologist at [facility] later this month        Bryan Guadalupe MD      This note is created with the assistance of a speech recognition program.  While intending to generate a document that actually reflects the content of the visit, the document can still have some errors including those of syntax and sound a like substitutions which may escape proof reading. It such instances, actual meaning can be extrapolated by contextual diversion.

## 2022-09-07 LAB
FERRITIN: 86 NG/ML (ref 13–150)
FOLATE: 12.2 NG/ML
FREE KAPPA/LAMBDA RATIO: 11.31 (ref 0.26–1.65)
IRON SATURATION: 17 % (ref 20–55)
IRON: 61 UG/DL (ref 37–145)
KAPPA FREE LIGHT CHAINS QNT: 13.68 MG/DL (ref 0.37–1.94)
LAMBDA FREE LIGHT CHAINS QNT: 1.21 MG/DL (ref 0.57–2.63)
TOTAL IRON BINDING CAPACITY: 363 UG/DL (ref 250–450)
UNSATURATED IRON BINDING CAPACITY: 302 UG/DL (ref 112–347)
VITAMIN B-12: 1357 PG/ML (ref 232–1245)

## 2022-09-09 ENCOUNTER — TELEPHONE (OUTPATIENT)
Dept: ONCOLOGY | Age: 82
End: 2022-09-09

## 2022-09-09 NOTE — TELEPHONE ENCOUNTER
Writer followed up to make sure that pt has appt scheduled with oncology in [facility]. Pt stated that she is scheduled for f/u on 09/20/2022 with Dr. Ra Clay.

## 2022-09-09 NOTE — TELEPHONE ENCOUNTER
----- Message from Clinton Smith MD sent at 9/9/2022  6:51 AM EDT -----  Teresa Elizabeth call me about the patient

## 2022-09-21 ENCOUNTER — HOSPITAL ENCOUNTER (OUTPATIENT)
Dept: LAB | Age: 82
Discharge: HOME | End: 2022-09-21
Payer: MEDICARE

## 2022-09-21 LAB
IGA: 225 MG/DL (ref 70–400)
IGG: 806 MG/DL (ref 700–1600)
IGM: 30 MG/DL (ref 40–230)

## 2022-09-21 PROCEDURE — 86334 IMMUNOFIX E-PHORESIS SERUM: CPT

## 2022-09-21 PROCEDURE — 82784 ASSAY IGA/IGD/IGG/IGM EACH: CPT

## 2022-09-21 PROCEDURE — 36415 COLL VENOUS BLD VENIPUNCTURE: CPT

## 2022-09-21 PROCEDURE — 84165 PROTEIN E-PHORESIS SERUM: CPT

## 2022-09-21 PROCEDURE — 84155 ASSAY OF PROTEIN SERUM: CPT

## 2022-09-22 ENCOUNTER — HOSPITAL ENCOUNTER (OUTPATIENT)
Age: 82
Setting detail: SPECIMEN
Discharge: HOME | End: 2022-09-22
Payer: MEDICARE

## 2022-09-22 PROCEDURE — 84166 PROTEIN E-PHORESIS/URINE/CSF: CPT

## 2022-09-22 PROCEDURE — 84156 ASSAY OF PROTEIN URINE: CPT

## 2022-09-22 PROCEDURE — 86335 IMMUNFIX E-PHORSIS/URINE/CSF: CPT

## 2022-09-23 LAB
ALBUMIN (CALCULATED): 3.9 G/DL (ref 3.2–5.2)
ALBUMIN PERCENT: 64 % (ref 45–65)
ALPHA 1 PERCENT: 3 % (ref 3–6)
ALPHA 2 PERCENT: 12 % (ref 6–13)
ALPHA-1-GLOBULIN: 0.2 G/DL (ref 0.1–0.4)
ALPHA-2-GLOBULIN: 0.7 G/DL (ref 0.5–0.9)
BETA GLOBULIN: 0.7 G/DL (ref 0.5–1.1)
BETA PERCENT: 11 % (ref 11–19)
GAMMA GLOBULIN %: 10 % (ref 9–20)
GAMMA GLOBULIN: 0.6 G/DL (ref 0.5–1.5)
PATHOLOGIST: ABNORMAL
PATHOLOGIST: NORMAL
PROTEIN ELECTROPHORESIS, SERUM: ABNORMAL
SERUM IFX INTERP: NORMAL
TOTAL PROT. SUM,%: 100 % (ref 98–102)
TOTAL PROT. SUM: 6.1 G/DL (ref 6.3–8.2)
TOTAL PROTEIN: 6.1 G/DL (ref 6.4–8.3)

## 2022-09-27 LAB
P E INTERPRETATION, U: NORMAL
PATHOLOGIST: NORMAL
SPECIMEN TYPE: NORMAL
URINE IFX INTERP: NORMAL
URINE IFX SPECIMEN: NORMAL
URINE TOTAL PROTEIN: 6 MG/DL
URINE TOTAL PROTEIN: 6 MG/DL
VOLUME: NORMAL ML

## 2022-10-24 RX ORDER — FUROSEMIDE 20 MG/1
TABLET ORAL
Qty: 180 TABLET | Refills: 3 | Status: SHIPPED | OUTPATIENT
Start: 2022-10-24

## 2022-10-24 NOTE — TELEPHONE ENCOUNTER
Last Appt:  Visit date not found  Next Appt:   11/17/2022  Med verified in [facility]

## 2022-10-25 ENCOUNTER — NURSE ONLY (OUTPATIENT)
Dept: FAMILY MEDICINE CLINIC | Age: 82
End: 2022-10-25
Payer: MEDICARE

## 2022-10-25 DIAGNOSIS — Z23 NEED FOR INFLUENZA VACCINATION: Primary | ICD-10-CM

## 2022-10-25 PROCEDURE — 90471 IMMUNIZATION ADMIN: CPT

## 2022-10-25 PROCEDURE — PBSHW INFLUENZA, FLUAD, (AGE 65 Y+), IM, PF, 0.5 ML

## 2022-11-17 ENCOUNTER — OFFICE VISIT (OUTPATIENT)
Dept: CARDIOLOGY | Age: 82
End: 2022-11-17
Payer: MEDICARE

## 2022-11-17 VITALS
DIASTOLIC BLOOD PRESSURE: 66 MMHG | SYSTOLIC BLOOD PRESSURE: 130 MMHG | HEIGHT: 62 IN | WEIGHT: 191 LBS | BODY MASS INDEX: 35.15 KG/M2 | HEART RATE: 66 BPM

## 2022-11-17 DIAGNOSIS — I48.91 ATRIAL FIBRILLATION, UNSPECIFIED TYPE (HCC): Primary | ICD-10-CM

## 2022-11-17 PROCEDURE — 93010 ELECTROCARDIOGRAM REPORT: CPT | Performed by: INTERNAL MEDICINE

## 2022-11-17 PROCEDURE — G8417 CALC BMI ABV UP PARAM F/U: HCPCS | Performed by: INTERNAL MEDICINE

## 2022-11-17 PROCEDURE — 1123F ACP DISCUSS/DSCN MKR DOCD: CPT | Performed by: INTERNAL MEDICINE

## 2022-11-17 PROCEDURE — 1090F PRES/ABSN URINE INCON ASSESS: CPT | Performed by: INTERNAL MEDICINE

## 2022-11-17 PROCEDURE — 93005 ELECTROCARDIOGRAM TRACING: CPT | Performed by: INTERNAL MEDICINE

## 2022-11-17 PROCEDURE — 1036F TOBACCO NON-USER: CPT | Performed by: INTERNAL MEDICINE

## 2022-11-17 PROCEDURE — G8484 FLU IMMUNIZE NO ADMIN: HCPCS | Performed by: INTERNAL MEDICINE

## 2022-11-17 PROCEDURE — 3078F DIAST BP <80 MM HG: CPT | Performed by: INTERNAL MEDICINE

## 2022-11-17 PROCEDURE — G8427 DOCREV CUR MEDS BY ELIG CLIN: HCPCS | Performed by: INTERNAL MEDICINE

## 2022-11-17 PROCEDURE — 99213 OFFICE O/P EST LOW 20 MIN: CPT | Performed by: INTERNAL MEDICINE

## 2022-11-17 PROCEDURE — 3074F SYST BP LT 130 MM HG: CPT | Performed by: INTERNAL MEDICINE

## 2022-11-17 PROCEDURE — 99214 OFFICE O/P EST MOD 30 MIN: CPT | Performed by: INTERNAL MEDICINE

## 2022-11-17 PROCEDURE — G8399 PT W/DXA RESULTS DOCUMENT: HCPCS | Performed by: INTERNAL MEDICINE

## 2022-11-17 NOTE — PROGRESS NOTES
Today's Date: 11/17/2022  Patient's Name: [place]  Patient's age: 79 y. o., 1940    CC: follow up for PAF   HPI:  [place]  is here for follow-up. She underwent cryoablation for atrial fibrillation in February 2022 at [facility]. She is maintaining normal sinus rhythm. She denies any recurrence of palpitations or irregular heart rhythm. Denies any chest pain or dyspnea. No lower extremity edema. Past Medical History:   has a past medical history of Arthritis, Cervical spine degeneration, Chronic renal insufficiency, stage II (mild), Colon polyps, Constipation, Cystocele, Diverticulitis of colon, Dyspepsia, GERD (gastroesophageal reflux disease), GI bleed, Head ache, Hyperlipidemia, Hypertension, Multiple melanocytic nevi, Mumps, Over weight, Panic attacks, Paroxysmal atrial fibrillation ([address] Positive cardiac stress test, Shingles, and Tachycardia. Past Surgical History:   has a past surgical history that includes back surgery (1985, 1986); Colonoscopy (2006, 2009); colectomy (10/2006); Uterine fibroid embolization (10/2006); bladder repair (05/06/2009); Colonoscopy (2016); Colonoscopy (03/2019); Cardiac catheterization (11/28/2000); Cardiac catheterization (03/19/2021); transesophageal echocardiogram (06/11/2021); Cardioversion (06/11/2021); pr biopsy of breast, incisional (Right, 1994); pr punc/aspir breast cyst (Right); Breast surgery (Right, 1994); and [facility] STEREO BREAST BX W LOC DEVICE 1ST LESION RIGHT (Right, 7/12/2021). Home Medications:  Prior to Admission medications    Medication Sig Start Date End Date Taking?  Authorizing Provider   furosemide (LASIX) 20 MG tablet take 1 tablet by mouth once daily 10/24/22  Yes Raoul Gottlieb, DO   potassium chloride (KLOR-CON M) 20 MEQ extended release tablet Take 1 tablet by mouth daily 8/29/22 11/27/22 Yes Ghazal Patten, DO   anastrozole (ARIMIDEX) 1 MG tablet Take 1 mg by mouth in the morning. 6/28/22  Yes Historical Provider, MD   calcium carbonate (OSCAL) 500 MG TABS tablet Take 500 mg by mouth in the morning and 500 mg before bedtime. Yes Historical Provider, MD   metoprolol succinate (TOPROL XL) 50 MG extended release tablet daily 1/10/22  Yes Historical Provider, MD   apixaban (ELIQUIS) 2.5 MG TABS tablet Take 1 tablet by mouth 2 times daily 10/19/21  Yes Blaise Gottlieb DO   simvastatin (ZOCOR) 20 MG tablet TAKE 1 TABLET EVERY EVENING 10/19/21  Yes Blaise Gottlieb DO   acetaminophen (TYLENOL) 325 MG tablet Take 650 mg by mouth every 6 hours as needed for Pain   Yes Historical Provider, MD   Cholecalciferol (VITAMIN D3) 1000 units CAPS Take by mouth in the morning and at bedtime   Yes Historical Provider, MD   Cyanocobalamin (B-12) 1000 MCG CAPS Take 1 tablet by mouth daily Indications: High Binding Capacity of Iron to Red Blood Cells    Yes Historical Provider, MD   polyethylene glycol (GLYCOLAX) powder Take 17 g by mouth daily    Yes Historical Provider, MD       Allergies:  Latex, Adhesive tape, Codeine, Morphine, and Other    Social History:   reports that she has never smoked. She has never used smokeless tobacco. She reports that she does not drink alcohol and does not use drugs. Family History: family history includes Emphysema in her father; Heart Disease in her sister; High Blood Pressure in her mother; Osteoporosis in her sister. No h/o sudden cardiac death. No for premature CAD      REVIEW OF SYSTEMS:    Constitutional: there has been mild decline in functional capacity due to orthostatic symptoms  Eyes: No visual changes or diplopia. No scleral icterus. ENT: No Headaches, hearing loss or vertigo. No mouth sores or sore throat. Cardiovascular: see above  Respiratory: see above  Gastrointestinal: No abdominal pain, appetite loss, blood in stools. Genitourinary: No dysuria, trouble voiding, or hematuria. Musculoskeletal:  No gait disturbance, No weakness or joint complaints.   Integumentary: No rash or pruritis. Neurological: No headache or diplopia. No tingling  Psychiatric: No anxiety, or depression. Endocrine: No temperature intolerance. Hematologic/Lymphatic: No abnormal bruising or bleeding, blood clots or swollen lymph nodes. Allergic/Immunologic: No nasal congestion or hives. PHYSICAL EXAM:      Vitals:    11/17/22 1334   BP: 130/66   Pulse: 66     Constitutional and General Appearance: alert, cooperative, no distress and appears stated age  HEENT: PERRL, no cervical lymphadenopathy. No masses palpable. Normal oral mucosa  Respiratory:  Normal excursion and expansion without use of accessory muscles  Resp Auscultation: Good respiratory effort. No for increased work of breathing. On auscultation: clear to auscultation bilaterally  Cardiovascular:  Heart tones are irregularly irregular  Jugular venous pulsation Normal  The carotid upstroke is normal in amplitude and contour without delay or bruit   Abdomen:   soft  Bowel sounds present  Extremities:   No edema  Neurological:  Alert and oriented. Cardiac Data:  EKG 11/17/2022 normal sinus rhythm, normal ECG      Labs:   LABS  Lab Results   Component Value Date    CHOL 194 08/08/2022    TRIG 84 08/08/2022    HDL 87 08/08/2022    LDLCHOLESTEROL 90 08/08/2022    VLDL NOT REPORTED 07/20/2020    CHOLHDLRATIO 2.2 08/08/2022       Lab Results   Component Value Date     08/08/2022    K 4.2 08/08/2022     08/08/2022    CO2 28 08/08/2022    BUN 20 08/08/2022    CREATININE 1.55 (H) 08/08/2022    GLUCOSE 98 08/08/2022    CALCIUM 9.7 08/08/2022    PROT 6.1 (L) 09/21/2022    LABALBU 4.0 08/08/2022    BILITOT 0.53 08/08/2022    ALKPHOS 52 08/08/2022    AST 24 08/08/2022    ALT 16 08/08/2022    LABGLOM 32 (L) 08/08/2022    GFRAA 39 (L) 08/08/2022       Nuclear stress test 6/11/19  1. Very small area with mild ischemia laterally. 2. LVEF 70%. Nuclear stress test 11/30/2020  1. Small area of apical-lateral ischemia. 2.  LVEF 70%.     Cardiac cath 3/19/21  LMCA: Normal 0% stenosis. LAD: Mild irregularities 10-20%. LCx: Large, dominant, patent and normal.  OM is large and normal  LPDA/LPL are small with mild disease  RCA Small. Nondominant and could not be engaged, possibly occluded. JOANNA/CV 6/11/2021  1. A JOANNA was performed without complications. 2. Normal Lv size with normal systolic function. Estimated LVEF 55%  3. No thrombus or valvular vegetation identified  4. Moderate MR and moderate AI. CV to NSR with single 200 J shock. 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  -Moderate AI and mild to moderate MR on JOANNA  -Hx of GI bleed hospitalized at [facility] followed by colonoscopy by Dr. Leana Jones noted to have AV malformation and benign polyps by biopsy and anticoagulation was discontinued  -Diastolic dysfunction: no signs of volume overload. -CKD (last Cr1.5)         Plan of Treatment:     Continue Lopressor and Eliquis  Dose of Eliquis adjusted for age greater than [de-identified] and CKD  On lasix 20 mg qd with no signs of volume overload on examination. Repeat evaluation in 6 months or earlier if needed       Alyson Biswas MD   [facility]  [facility]. [facility]  [phone]

## 2022-11-28 RX ORDER — SIMVASTATIN 20 MG
TABLET ORAL
Qty: 90 TABLET | Refills: 3 | Status: SHIPPED | OUTPATIENT
Start: 2022-11-28

## 2022-12-09 ENCOUNTER — HOSPITAL ENCOUNTER (OUTPATIENT)
Age: 82
Discharge: HOME | End: 2022-12-09
Payer: MEDICARE

## 2022-12-09 DIAGNOSIS — E78.2 MIXED HYPERLIPIDEMIA: ICD-10-CM

## 2022-12-09 DIAGNOSIS — I10 ESSENTIAL HYPERTENSION: ICD-10-CM

## 2022-12-09 DIAGNOSIS — N18.32 STAGE 3B CHRONIC KIDNEY DISEASE (HCC): ICD-10-CM

## 2022-12-09 LAB
ALBUMIN SERPL-MCNC: 3.8 G/DL (ref 3.5–5.2)
ALBUMIN/GLOBULIN RATIO: 1.5 (ref 1–2.5)
ALP BLD-CCNC: 47 U/L (ref 35–104)
ALT SERPL-CCNC: 9 U/L (ref 5–33)
ANION GAP SERPL CALCULATED.3IONS-SCNC: 8 MMOL/L (ref 9–17)
AST SERPL-CCNC: 15 U/L
BILIRUB SERPL-MCNC: 0.7 MG/DL (ref 0.3–1.2)
BUN BLDV-MCNC: 22 MG/DL (ref 8–23)
BUN/CREAT BLD: 17 (ref 9–20)
CALCIUM SERPL-MCNC: 9.8 MG/DL (ref 8.6–10.4)
CHLORIDE BLD-SCNC: 109 MMOL/L (ref 98–107)
CHOLESTEROL/HDL RATIO: 2
CHOLESTEROL: 183 MG/DL
CO2: 28 MMOL/L (ref 20–31)
CREAT SERPL-MCNC: 1.28 MG/DL (ref 0.5–0.9)
GFR SERPL CREATININE-BSD FRML MDRD: 42 ML/MIN/1.73M2
GLUCOSE BLD-MCNC: 101 MG/DL (ref 70–99)
HDLC SERPL-MCNC: 91 MG/DL
LDL CHOLESTEROL: 81 MG/DL (ref 0–130)
POTASSIUM SERPL-SCNC: 4.4 MMOL/L (ref 3.7–5.3)
SODIUM BLD-SCNC: 145 MMOL/L (ref 135–144)
TOTAL PROTEIN: 6.4 G/DL (ref 6.4–8.3)
TRIGL SERPL-MCNC: 57 MG/DL

## 2022-12-09 PROCEDURE — 36415 COLL VENOUS BLD VENIPUNCTURE: CPT

## 2022-12-09 PROCEDURE — 80061 LIPID PANEL: CPT

## 2022-12-09 PROCEDURE — 80053 COMPREHEN METABOLIC PANEL: CPT

## 2022-12-19 ENCOUNTER — OFFICE VISIT (OUTPATIENT)
Dept: FAMILY MEDICINE CLINIC | Age: 82
End: 2022-12-19
Payer: MEDICARE

## 2022-12-19 VITALS
HEART RATE: 76 BPM | DIASTOLIC BLOOD PRESSURE: 82 MMHG | BODY MASS INDEX: 35.7 KG/M2 | SYSTOLIC BLOOD PRESSURE: 136 MMHG | OXYGEN SATURATION: 97 % | WEIGHT: 194 LBS | HEIGHT: 62 IN

## 2022-12-19 DIAGNOSIS — I10 ESSENTIAL HYPERTENSION: Primary | ICD-10-CM

## 2022-12-19 DIAGNOSIS — E66.01 SEVERE OBESITY (BMI 35.0-39.9) WITH COMORBIDITY (HCC): ICD-10-CM

## 2022-12-19 PROCEDURE — G8417 CALC BMI ABV UP PARAM F/U: HCPCS

## 2022-12-19 PROCEDURE — 1036F TOBACCO NON-USER: CPT

## 2022-12-19 PROCEDURE — G8427 DOCREV CUR MEDS BY ELIG CLIN: HCPCS

## 2022-12-19 PROCEDURE — G8399 PT W/DXA RESULTS DOCUMENT: HCPCS

## 2022-12-19 PROCEDURE — 1090F PRES/ABSN URINE INCON ASSESS: CPT

## 2022-12-19 PROCEDURE — G8484 FLU IMMUNIZE NO ADMIN: HCPCS

## 2022-12-19 PROCEDURE — 1123F ACP DISCUSS/DSCN MKR DOCD: CPT

## 2022-12-19 PROCEDURE — 3078F DIAST BP <80 MM HG: CPT

## 2022-12-19 PROCEDURE — 3074F SYST BP LT 130 MM HG: CPT

## 2022-12-19 PROCEDURE — 99213 OFFICE O/P EST LOW 20 MIN: CPT

## 2022-12-19 NOTE — PROGRESS NOTES
[facility] Carolyn Townsend (:  1940) is a 79 y.o. female,Established patient, here for evaluation of the following chief complaint(s):  Hypertension (Pt is here for a lab follow up. She had labs done on 22. Pt would like to discuss her potassium pill. She is wandering if she needs to be on it. )         ASSESSMENT/PLAN:  1. Essential hypertension  Assessment & Plan:   At goal, continue current medications and continue current treatment plan continue current dose of Lasix 20 mg once daily, Toprol-XL 50 mg daily. Denies side effects with his medications. Was placed on potassium 20 mEq daily, however he she has never had hypokalemia in the past.  She is wondering if she needs to take this. We will stop this recheck CMP in 6 months to ensure she does not have hypokalemia, she verbalized understanding this plan. 2. Severe obesity (BMI 35.0-39. 9) with comorbidity ([address]  Discussed diet, and exercise. Exercise as tolerated 30 minutes a day with a goal 150 minutes a week. Return if symptoms worsen or fail to improve. Subjective   SUBJECTIVE/OBJECTIVE:  [place] is in the office today to establish PCP. All past notes, H&P, lab results are pertinent to her care are reviewed at time of appointment. She is on Eliquis for atrial fibrillation, does take Toprol and furosemide. Prior PCP put her on potassium anticipatory for hypokalemia due to her being on furosemide. Prior lab results do not show hypokalemia spanning back to  and beyond. Due to this we will hold off on her potassium replacement, recheck CMP in 6 months. Review of Systems   Constitutional:  Negative for chills, fatigue, fever and unexpected weight change. HENT:  Negative for congestion, ear pain, rhinorrhea, sinus pressure and sinus pain. Eyes:  Negative for discharge, itching and visual disturbance. Respiratory:  Negative for cough, chest tightness, shortness of breath and wheezing.     Cardiovascular:  Negative for chest pain, palpitations and leg swelling. Gastrointestinal:  Negative for abdominal pain, constipation, diarrhea and nausea. Endocrine: Negative for cold intolerance, heat intolerance, polydipsia and polyphagia. Genitourinary:  Negative for dysuria, flank pain and frequency. Musculoskeletal:  Negative for arthralgias, back pain and myalgias. Skin:  Negative for color change. Allergic/Immunologic: Negative for environmental allergies and food allergies. Neurological:  Negative for dizziness, weakness, light-headedness, numbness and headaches. Psychiatric/Behavioral:  Negative for agitation, behavioral problems and confusion. The patient is not nervous/anxious. Objective   Physical Exam  Vitals and nursing note reviewed. Constitutional:       General: She is not in acute distress. Appearance: Normal appearance. She is not ill-appearing. HENT:      Head: Normocephalic and atraumatic. Right Ear: Tympanic membrane and external ear normal.      Left Ear: Tympanic membrane and external ear normal.      Nose: Nose normal. No congestion or rhinorrhea. Mouth/Throat:      Mouth: Mucous membranes are moist.      Pharynx: Oropharynx is clear. No posterior oropharyngeal erythema. Eyes:      Pupils: Pupils are equal, round, and reactive to light. Cardiovascular:      Rate and Rhythm: Normal rate and regular rhythm. Pulses: Normal pulses. Heart sounds: Normal heart sounds. Pulmonary:      Effort: Pulmonary effort is normal.      Breath sounds: Normal breath sounds. No wheezing or rhonchi. Abdominal:      General: Bowel sounds are normal.      Palpations: There is no mass. Tenderness: There is no abdominal tenderness. Musculoskeletal:         General: No swelling or tenderness. Normal range of motion. Cervical back: Normal range of motion. No rigidity or tenderness. Skin:     General: Skin is warm and dry.       Capillary Refill: Capillary refill takes less than 2 seconds. Coloration: Skin is not pale. Findings: No erythema. Neurological:      General: No focal deficit present. Mental Status: She is alert and oriented to person, place, and time. Psychiatric:         Mood and Affect: Mood normal.         Behavior: Behavior normal.         Thought Content: Thought content normal.         Judgment: Judgment normal.                An electronic signature was used to authenticate this note.     --FRANNIE Galicia - CNP

## 2022-12-20 ASSESSMENT — ENCOUNTER SYMPTOMS
COUGH: 0
EYE DISCHARGE: 0
EYE ITCHING: 0
RHINORRHEA: 0
SINUS PAIN: 0
CHEST TIGHTNESS: 0
ABDOMINAL PAIN: 0
CONSTIPATION: 0
SHORTNESS OF BREATH: 0
COLOR CHANGE: 0
BACK PAIN: 0
NAUSEA: 0
WHEEZING: 0
SINUS PRESSURE: 0
DIARRHEA: 0

## 2022-12-20 NOTE — ASSESSMENT & PLAN NOTE
At goal, continue current medications and continue current treatment plan continue current dose of Lasix 20 mg once daily, Toprol-XL 50 mg daily. Denies side effects with his medications. Was placed on potassium 20 mEq daily, however he she has never had hypokalemia in the past.  She is wondering if she needs to take this. We will stop this recheck CMP in 6 months to ensure she does not have hypokalemia, she verbalized understanding this plan.

## 2023-01-17 ENCOUNTER — OFFICE VISIT (OUTPATIENT)
Dept: NEPHROLOGY | Age: 83
End: 2023-01-17

## 2023-01-17 VITALS
BODY MASS INDEX: 35.33 KG/M2 | DIASTOLIC BLOOD PRESSURE: 78 MMHG | WEIGHT: 192 LBS | SYSTOLIC BLOOD PRESSURE: 132 MMHG | HEIGHT: 62 IN | HEART RATE: 72 BPM

## 2023-01-17 DIAGNOSIS — I10 ESSENTIAL HYPERTENSION: ICD-10-CM

## 2023-01-17 DIAGNOSIS — N25.81 SECONDARY HYPERPARATHYROIDISM (HCC): Primary | ICD-10-CM

## 2023-01-17 DIAGNOSIS — N18.32 STAGE 3B CHRONIC KIDNEY DISEASE (HCC): ICD-10-CM

## 2023-01-17 NOTE — PROGRESS NOTES
Renal Progress Note    Patient :  Racheal Newsome; 79 y.o. MRN# [id number]    Reason for Consult:     Asked by Roselia Hernández NP to see for KELLEY/Elevated Creatinine. History of Present Illness:     Racheal Newsome; 79 y.o. female with past medical history as mentioned below. The patient comes back in 6 month return visit. She has chronic kidney disease stage IIIB, essential hypertension, and secondary hyperparathyroidism. No chest pain or shortness of breath or nausea or vomiting or swelling of the extremities. She does have a relatively stable serum protein immunofixation from 9/21/2022 showing monoclonal IgA kappa 0.07 G/DL. The patient has no significant history of protein in the urine and as documented below. Last estimated GFR was 42 ml/m with BUN 22 and creatinine better 1.28 with sodium 145, potassium 4.4 chloride 109 and CO2 28 with calcium 9.8 from 12/9/2022. Liver function tests were within normal limits and albumin was 3.8. Renal ultrasound shows smallish kidneys but no other significant abnormalities. She has mild secondary hyperparathyroidism. She has history of essential hypertension for many years, degenerative arthritis, known history of chronic kidney disease stage IIIb for at least a couple of years, likely more, constipation, colonic polyps, paroxysmal atrial fibrillation although now it appears to be persistent as the patient had a relatively recent cardioversion that did not get her out of A. fib. There is some plans apparently for possible ablation. She does also have cardiomyopathy with LVEF of 50% with moderate MR and moderate AI from a JOANNA from June 2021. She said she did have a cardiac catheterization in the summer 2021 that did not show any critical CAD to her knowledge and she did not require any stents. She also has a history of cystocele, diverticulitis, GI bleed, mumps, panic attacks, shingles, tachycardia.   She also recently had a partial mastectomy for breast cancer and is on tamoxifen. She did not receive radiation or other chemotherapy. Apparently, the cancer was contained, according to the patient. The patient had a hysteroscopy and dilatation and curettage 2022 for a uterine mass and biopsy was taken. Her mother  of kidney failure but did not have dialysis. She says her sister has some kidney disease but not on dialysis. No history of dialysis or transplant in the family. Family history of heart disease, emphysema, osteoporosis and hypertension. Allergies to codeine, adhesive tape, morphine and bee stings. Patient is . She is a never smoker. Medications are reviewed. She used to be on amlodipine but that was discontinued because of hypotension. JOANNA on 2021 showed LVEF 50%, mild to moderate MR, moderate AI no valvular vegetations. No history of recent contrast exposure, No h/o prolonged NSAIDs use in the past and she does occasionally use Tylenol if needed, No h/o nephrolithiasis, No recent skin rashes or arthralgias, No hematuria or pyuria noticed in the recent past. Doesn't report any reduction in the urine output recently. Non report of any obstructive urinary symptoms (urgency, frequency, weak stream, straining while urination). No h/o recurrent UTIs in the past.  She denies any not urea. She does get edema at times and has been on Lasix 20 mg oral daily. Past History/Allergies? Social History:     Past Medical History:   Diagnosis Date    Arthritis     degenerative    Cervical spine degeneration     Chronic renal insufficiency, stage II (mild)     Colon polyps     history of     Constipation     Cystocele     Diverticulitis of colon     history of     Dyspepsia     history of     GERD (gastroesophageal reflux disease)     GI bleed 2016    Head ache     Hyperlipidemia     Hypertension     Multiple melanocytic nevi     Mumps     Over weight     Panic attacks     history of Paroxysmal atrial fibrillation (HCC)     Positive cardiac stress test 03/2021    Shingles     history of     Tachycardia     episodic       Allergies   Allergen Reactions    Latex Rash    Adhesive Tape      rash    Codeine Other (See Comments)     nightmares    Morphine Other (See Comments)     Nightmares      Other      Bee stings - swelling       Social History     Socioeconomic History    Marital status:      Spouse name: Not on file    Number of children: Not on file    Years of education: Not on file    Highest education level: Not on file   Occupational History    Not on file   Tobacco Use    Smoking status: Never    Smokeless tobacco: Never    Tobacco comments:     shivani rrt 7/15/17   Vaping Use    Vaping Use: Never used   Substance and Sexual Activity    Alcohol use: No    Drug use: No    Sexual activity: Yes     Partners: Male   Other Topics Concern    Not on file   Social History Narrative    Not on file     Social Determinants of Health     Financial Resource Strain: Low Risk     Difficulty of Paying Living Expenses: Not hard at all   Food Insecurity: No Food Insecurity    Worried About Running Out of Food in the Last Year: Never true    Ran Out of Food in the Last Year: Never true   Transportation Needs: Not on file   Physical Activity: Not on file   Stress: Not on file   Social Connections: Not on file   Intimate Partner Violence: Not on file   Housing Stability: Not on file       Family History:        Family History   Problem Relation Age of Onset    High Blood Pressure Mother     Emphysema Father     Osteoporosis Sister     Heart Disease Sister         CHF       Outpatient Medications:     Not in a hospital admission.    Review of Systems:     Constitutional: No fever, no chills, no lethargy, no weakness.  No history of paraprotein disease, leukemia or lymphoma although she does have an elevated serum free light chain ratio on serologies just drawn in December 2021  HEENT:  No headache,  ear pain, itchy eyes, nasal discharge or sore throat. Cardiac:  No chest pain, dyspnea, orthopnea or PND. Chest:   No cough, phlegm or wheezing. Abdomen:  No abdominal pain, nausea or vomiting. Neuro:   No focal weakness, abnormal movements or seizure like activity. Skin:   No rashes, no itching. No history of vasculitides  :   No hematuria, no pyuria, no dysuria, no flank pain. Extremities:  No swelling on Lasix and no history of connective tissue disease  ROS was otherwise negative except as mentioned in the [address] Vital Signs:     Vitals:    01/17/23 1106   BP: 132/78   Pulse: 72     Wt Readings from Last 2 Encounters:   01/17/23 192 lb (87.1 kg)   12/19/22 194 lb (88 kg)       Physical Examination:     General:  AAO x 3, speaking in full sentences, no accessory muscle use. HEENT: Atraumatic, normocephalic, no throat congestion, moist mucosa. Eyes:   Normal conjunctiva, EOMI, no scleral icterus  Neck:   No JVD, midline trachea and no accessory muscle use  Chest:   Good bilateral air and change her to auscultation bilaterally without wheezes or rales or rhonchi  Cardiac:  Irregularly irregular rhythm with no rubs, positive murmur  Abdomen: Soft, non-tender, no masses or organomegaly, BS audible. :   No flank tenderness. Neuro:  AAO x 3   SKIN:  No rashes, good skin turgor. Extremities:  No edema, palpable peripheral pulses, no calf tenderness. Labs:     No results for input(s): WBC, RBC, HGB, HCT, MCV, MCH, MCHC, RDW, PLT, MPV in the last 72 hours. BMP: No results for input(s): NA, K, CL, CO2, BUN, CREATININE, GLUCOSE, CALCIUM in the last 72 hours. Phosphorus:   No results for input(s): PHOS in the last 72 hours. Magnesium:  No results for input(s): MG in the last 72 hours. Albumin:  No results for input(s): LABALBU in the last 72 hours.   BNP:    No results found for: BNP  ELIO:      Lab Results   Component Value Date/Time    ELIO NEGATIVE 12/13/2021 09:07 AM     SPEP:  Lab Results Component Value Date/Time    PROT 6.4 12/09/2022 09:10 AM    ALBCAL 3.9 09/21/2022 10:48 AM    ALBPCT 64 09/21/2022 10:48 AM    LABALPH 0.2 09/21/2022 10:48 AM    LABALPH 0.7 09/21/2022 10:48 AM    A1PCT 3 09/21/2022 10:48 AM    A2PCT 12 09/21/2022 10:48 AM    LABBETA 0.7 09/21/2022 10:48 AM    BETAPCT 11 09/21/2022 10:48 AM    GAMGLOB 0.6 09/21/2022 10:48 AM    GGPCT 10 09/21/2022 10:48 AM    PATH ELECTRONICALLY SIGNED. Jc Rendon MD 09/22/2022 07:00 AM     UPEP:     Lab Results   Component Value Date/Time    LABPE  09/22/2022 07:00 AM     FREE MONOCLONAL LIGHT CHAINS ARE PRESENT, IDENTIFIED AS     C3:     Lab Results   Component Value Date/Time    C3 118 12/13/2021 09:07 AM     C4:     Lab Results   Component Value Date/Time    C4 32 12/13/2021 09:07 AM     MPO ANCA:     Lab Results   Component Value Date/Time    MPO <0.3 12/13/2021 09:07 AM     PR3 ANCA:     Lab Results   Component Value Date/Time    PR3 <0.7 12/13/2021 09:07 AM     Anti-GBM:   No results found for: GBMABIGG  Hep BsAg:       No results found for: HEPBSAG  Hep C AB:        No results found for: HEPCAB     Labs in July 2021 showed CBC normal with hemoglobin 13.8, white blood cells 5.8 and platelets 952. BMP showed BUN 16 with creatinine 1.5 calcium 9.8 sodium 143 potassium 3.8 chloride 104, CO2 28 and estimated GFR of 33 mL/min. Labs are actually worse back in July 2020. Labs from 12/13/2021 showed normal EILO, normal C3, normal C4, ANCA negative, serum free light chain ratio elevated at 6.69, intact PTH level mildly elevated at 112, phosphorus low at 2.5, 25 vitamin D level normal at 42, uric acid 6, CBC not done because CBC in July was normal.  CMP from 12/13/2021 showed albumin normal at 4, normal liver function tests otherwise and albumin to globulin ratio normal at 1.7. Serum creatinine was 1.4 with BUN 18 similar to previous, sodium 141 with potassium 4.1 chloride 104 and CO2 27 with normal anion gap.   Estimated GFR 36 mL/min for chronic kidney disease stage IIIb. Urinalysis/Chemistries:      Lab Results   Component Value Date/Time    NITRU NEGATIVE 12/13/2021 09:20 AM    COLORU NOT REPORTED 12/13/2021 09:20 AM    PHUR 6.0 12/13/2021 09:20 AM    WBCUA 0 TO 4 12/13/2021 09:20 AM    RBCUA 0 TO 4 12/13/2021 09:20 AM    MUCUS NOT REPORTED 12/13/2021 09:20 AM    TRICHOMONAS NOT REPORTED 12/13/2021 09:20 AM    YEAST NOT REPORTED 12/13/2021 09:20 AM    BACTERIA TRACE 12/13/2021 09:20 AM    SPECGRAV 1.005 12/13/2021 09:20 AM    LEUKOCYTESUR 1+ 12/13/2021 09:20 AM    UROBILINOGEN Normal 12/13/2021 09:20 AM    BILIRUBINUR NEGATIVE 12/13/2021 09:20 AM    GLUCOSEU NEGATIVE 12/13/2021 09:20 AM    KETUA NEGATIVE 12/13/2021 09:20 AM    AMORPHOUS NOT REPORTED 12/13/2021 09:20 AM     Urine Sodium:   No results found for: MARIA ELENA  Urine Potassium:  No results found for: KUR  Urine Chloride:  No results found for: CLUR  Urine Osmolarity: No results found for: OSMOU  Urine Protein:     Lab Results   Component Value Date/Time    TPU 6 09/22/2022 07:00 AM    TPU 6 09/22/2022 07:00 AM     Urine Creatinine:     Lab Results   Component Value Date/Time    LABCREA 106.4 12/13/2021 09:20 AM     UPC:     Urine Eosinophils:  No components found for: UEOS     Urinalysis showed negative protein by dipstick with remainder of results above. Random urine protein to creatinine ratio showed normal at about 0.1. Radiology:     CXR:     Assessment:     1. Chronic kidney disease stage IIIb, likely secondary to hypertension and nephrosclerosis. Normal random urine for protein to creatinine ratio and normal complements, normal ELIO and normal ANCA. 2.  IgG kappa monoclonal gammopathy of uncertain significance with 0.07 g/dL. She is seeing hematology. 3.  Essential hypertension with good control   4.   Relatively recent diagnosis of breast cancer status post partial mastectomy and has been on tamoxifen for a couple of months  5  History of paroxysmal atrial fibrillation which now appears to be persistent with failed cardioversion. She says she developed atrial fibrillation in about 2002. She was not on oral anticoagulation until recently. She is on Eliquis. She said she was on propafenone for a while which did help with the atrial fibrillation. 6.  History of aortic insufficiency and moderate mitral valve regurgitation and a degree of cardiomyopathy with LVEF of 50% with the patient denying any history of critical coronary artery disease on recent cardiac catheterization  7. History of edema that is controlled with Lasix. Also, being off amlodipine likely will help. 8.  History of hypovitaminosis D, controlled with supplement  9. Normal serum albumin and normal albumin globulin ratio  10. Renal ultrasound showed somewhat bilateral small kidneys with right kidney 9.4 cm and left kidney 8.5 cm in an upper pole renal cyst likely age-related at about 2.5 cm. No hydronephrosis or masses or stones seen. 11.  Secondary hyperparathyroidism, mild and not requiring treatment       Plan:   1. No medication changes today  2. Urinalysis along with a random urine protein to creatinine ratio prior to the next visit  3. Return to see me in 6 months, earlier if necessary  4. Continue to avoid NSAIDs and other nephrotoxic agents  5. 25 vitamin D, intact PTH, phosphorus, albumin, uric acid, basic metabolic panel and CBC prior to the next visit      Jesus Kinney.  Mimi Dillon MD  [facility]  1/17/2023

## 2023-01-26 ENCOUNTER — OFFICE VISIT (OUTPATIENT)
Dept: CARDIOLOGY | Age: 83
End: 2023-01-26
Payer: MEDICARE

## 2023-01-26 VITALS
DIASTOLIC BLOOD PRESSURE: 70 MMHG | BODY MASS INDEX: 35.51 KG/M2 | SYSTOLIC BLOOD PRESSURE: 120 MMHG | HEART RATE: 123 BPM | HEIGHT: 62 IN | WEIGHT: 193 LBS

## 2023-01-26 DIAGNOSIS — I48.91 ATRIAL FIBRILLATION, UNSPECIFIED TYPE (HCC): Primary | ICD-10-CM

## 2023-01-26 PROCEDURE — G8399 PT W/DXA RESULTS DOCUMENT: HCPCS | Performed by: INTERNAL MEDICINE

## 2023-01-26 PROCEDURE — 3074F SYST BP LT 130 MM HG: CPT | Performed by: INTERNAL MEDICINE

## 2023-01-26 PROCEDURE — 93005 ELECTROCARDIOGRAM TRACING: CPT | Performed by: INTERNAL MEDICINE

## 2023-01-26 PROCEDURE — 3078F DIAST BP <80 MM HG: CPT | Performed by: INTERNAL MEDICINE

## 2023-01-26 PROCEDURE — 99214 OFFICE O/P EST MOD 30 MIN: CPT | Performed by: INTERNAL MEDICINE

## 2023-01-26 PROCEDURE — 1036F TOBACCO NON-USER: CPT | Performed by: INTERNAL MEDICINE

## 2023-01-26 PROCEDURE — 99213 OFFICE O/P EST LOW 20 MIN: CPT | Performed by: INTERNAL MEDICINE

## 2023-01-26 PROCEDURE — 93010 ELECTROCARDIOGRAM REPORT: CPT | Performed by: INTERNAL MEDICINE

## 2023-01-26 PROCEDURE — G8484 FLU IMMUNIZE NO ADMIN: HCPCS | Performed by: INTERNAL MEDICINE

## 2023-01-26 PROCEDURE — 1090F PRES/ABSN URINE INCON ASSESS: CPT | Performed by: INTERNAL MEDICINE

## 2023-01-26 PROCEDURE — G8417 CALC BMI ABV UP PARAM F/U: HCPCS | Performed by: INTERNAL MEDICINE

## 2023-01-26 PROCEDURE — 1123F ACP DISCUSS/DSCN MKR DOCD: CPT | Performed by: INTERNAL MEDICINE

## 2023-01-26 PROCEDURE — G8427 DOCREV CUR MEDS BY ELIG CLIN: HCPCS | Performed by: INTERNAL MEDICINE

## 2023-01-26 RX ORDER — AMIODARONE HYDROCHLORIDE 200 MG/1
TABLET ORAL
Qty: 60 TABLET | Refills: 3 | Status: SHIPPED | OUTPATIENT
Start: 2023-01-26

## 2023-01-26 NOTE — PROGRESS NOTES
Today's Date: 1/26/2023  Patient's Name: [place]  Patient's age: 79 y. o., 1940    CC: follow up for PAF   HPI:  [place]  is here for follow-up. She underwent cryoablation for atrial fibrillation in February 2022 at [facility]. She was maintaining normal sinus rhythm until one month ago when she feels she flipped back to atrial fib. Today, she is in atrial fibrillation with RVR. Initial HR was in 120's, later 100-105 during examination. She denies any chest pain or dyspnea otherwise. No lower extremity edema. Past Medical History:   has a past medical history of Arthritis, Cervical spine degeneration, Chronic renal insufficiency, stage II (mild), Colon polyps, Constipation, Cystocele, Diverticulitis of colon, Dyspepsia, GERD (gastroesophageal reflux disease), GI bleed, Head ache, Hyperlipidemia, Hypertension, Multiple melanocytic nevi, Mumps, Over weight, Panic attacks, Paroxysmal atrial fibrillation ([address] Positive cardiac stress test, Shingles, and Tachycardia. Past Surgical History:   has a past surgical history that includes back surgery (1985, 1986); Colonoscopy (2006, 2009); colectomy (10/2006); Uterine fibroid embolization (10/2006); bladder repair (05/06/2009); Colonoscopy (2016); Colonoscopy (03/2019); Cardiac catheterization (11/28/2000); Cardiac catheterization (03/19/2021); transesophageal echocardiogram (06/11/2021); Cardioversion (06/11/2021); pr biopsy breast open incisional (Right, 1994); pr puncture aspiration cyst breast (Right); Breast surgery (Right, 1994); and [facility] STEREO BREAST BX W LOC DEVICE 1ST LESION RIGHT (Right, 7/12/2021). Home Medications:  Prior to Admission medications    Medication Sig Start Date End Date Taking?  Authorizing Provider   BIOTIN PO Take 2,500 mg by mouth in the morning and at bedtime   Yes Historical Provider, MD   simvastatin (ZOCOR) 20 MG tablet take 1 tablet by mouth at bedtime 11/28/22  Yes Raoul Gottlieb, DO   furosemide (LASIX) 20 MG tablet take 1 tablet by mouth once daily 10/24/22  Yes Raoul Gottlieb DO   anastrozole (ARIMIDEX) 1 MG tablet Take 1 mg by mouth in the morning. 6/28/22  Yes Historical Provider, MD   calcium carbonate (OSCAL) 500 MG TABS tablet Take 500 mg by mouth in the morning and 500 mg before bedtime. Yes Historical Provider, MD   metoprolol succinate (TOPROL XL) 50 MG extended release tablet daily 1/10/22  Yes Historical Provider, MD   apixaban (ELIQUIS) 2.5 MG TABS tablet Take 1 tablet by mouth 2 times daily 10/19/21  Yes Blaise Gottlieb DO   acetaminophen (TYLENOL) 325 MG tablet Take 650 mg by mouth every 6 hours as needed for Pain   Yes Historical Provider, MD   Cholecalciferol (VITAMIN D3) 1000 units CAPS Take by mouth in the morning and at bedtime   Yes Historical Provider, MD   Cyanocobalamin (B-12) 1000 MCG CAPS Take 1 tablet by mouth daily Indications: High Binding Capacity of Iron to Red Blood Cells    Yes Historical Provider, MD   polyethylene glycol (GLYCOLAX) powder Take 17 g by mouth daily    Yes Historical Provider, MD       Allergies:  Latex, Adhesive tape, Codeine, Morphine, and Other    Social History:   reports that she has never smoked. She has never used smokeless tobacco. She reports that she does not drink alcohol and does not use drugs. Family History: family history includes Emphysema in her father; Heart Disease in her sister; High Blood Pressure in her mother; Osteoporosis in her sister. No h/o sudden cardiac death. No for premature CAD      REVIEW OF SYSTEMS:    Constitutional: there has been mild decline in functional capacity due to orthostatic symptoms  Eyes: No visual changes or diplopia. No scleral icterus. ENT: No Headaches, hearing loss or vertigo. No mouth sores or sore throat. Cardiovascular: see above  Respiratory: see above  Gastrointestinal: No abdominal pain, appetite loss, blood in stools. Genitourinary: No dysuria, trouble voiding, or hematuria.   Musculoskeletal: No gait disturbance, No weakness or joint complaints. Integumentary: No rash or pruritis. Neurological: No headache or diplopia. No tingling  Psychiatric: No anxiety, or depression. Endocrine: No temperature intolerance. Hematologic/Lymphatic: No abnormal bruising or bleeding, blood clots or swollen lymph nodes. Allergic/Immunologic: No nasal congestion or hives. PHYSICAL EXAM:      Vitals:    01/26/23 1024   BP: 120/70   Pulse: (!) 123     Constitutional and General Appearance: alert, cooperative, no distress and appears stated age  [de-identified]: PERRL, no cervical lymphadenopathy. No masses palpable. Normal oral mucosa  Respiratory:  Normal excursion and expansion without use of accessory muscles  Resp Auscultation: Good respiratory effort. No for increased work of breathing. On auscultation: clear to auscultation bilaterally  Cardiovascular:  Heart tones are irregularly irregular  Jugular venous pulsation Normal  The carotid upstroke is normal in amplitude and contour without delay or bruit   Abdomen:   soft  Bowel sounds present  Extremities:   No edema  Neurological:  Alert and oriented. Cardiac Data:  EKG 11/17/2022 normal sinus rhythm, normal ECG    ECG 1/26/2023: atrial fib with RVR, nonspecific ST- depressions       Labs:   LABS  Lab Results   Component Value Date    CHOL 183 12/09/2022    TRIG 57 12/09/2022    HDL 91 12/09/2022    LDLCHOLESTEROL 81 12/09/2022    VLDL NOT REPORTED 07/20/2020    CHOLHDLRATIO 2.0 12/09/2022       Lab Results   Component Value Date     (H) 12/09/2022    K 4.4 12/09/2022     (H) 12/09/2022    CO2 28 12/09/2022    BUN 22 12/09/2022    CREATININE 1.28 (H) 12/09/2022    GLUCOSE 101 (H) 12/09/2022    CALCIUM 9.8 12/09/2022    PROT 6.4 12/09/2022    LABALBU 3.8 12/09/2022    BILITOT 0.7 12/09/2022    ALKPHOS 47 12/09/2022    AST 15 12/09/2022    ALT 9 12/09/2022    LABGLOM 42 (L) 12/09/2022    GFRAA 39 (L) 08/08/2022       Nuclear stress test 6/11/19  1.  Very small area with mild ischemia laterally. 2. LVEF 70%. Nuclear stress test 11/30/2020  1. Small area of apical-lateral ischemia. 2.  LVEF 70%. Cardiac cath 3/19/21  LMCA: Normal 0% stenosis. LAD: Mild irregularities 10-20%. LCx: Large, dominant, patent and normal.  OM is large and normal  LPDA/LPL are small with mild disease  RCA Small. Nondominant and could not be engaged, possibly occluded. JOANNA/CV 6/11/2021  1. A JOANNA was performed without complications. 2. Normal Lv size with normal systolic function. Estimated LVEF 55%  3. No thrombus or valvular vegetation identified  4. Moderate MR and moderate AI. CV to NSR with single 200 J shock. 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  -Moderate AI and mild to moderate MR on JOANNA  -Hx of GI bleed hospitalized at [facility] followed by colonoscopy by Dr. Melanie Brumfield noted to have AV malformation and benign polyps by biopsy and anticoagulation was discontinued  -Diastolic dysfunction: no signs of volume overload. -CKD (last Cr1.5)         Plan of Treatment:     Start Amiodarone po 400 mg bid x 7 days and then 200 mg bid  Repeat evaluation in 3-4 weeks, if remains in atrial fib, will consider DCCV  Patient suggested to go to ED in case of worsening palpitations/dyspnea/CHF  Continue Lopressor and Eliquis  Dose of Eliquis adjusted for age greater than [de-identified] and CKD  On lasix 20 mg qd with no signs of volume overload on examination. Adriane Munoz MD   [facility]  [email or web address]  [phone]

## 2023-02-16 ENCOUNTER — TELEPHONE (OUTPATIENT)
Dept: FAMILY MEDICINE CLINIC | Age: 83
End: 2023-02-16

## 2023-02-28 ENCOUNTER — HOSPITAL ENCOUNTER (OUTPATIENT)
Age: 83
Discharge: HOME | End: 2023-02-28
Payer: MEDICARE

## 2023-02-28 ENCOUNTER — OFFICE VISIT (OUTPATIENT)
Dept: CARDIOLOGY | Age: 83
End: 2023-02-28
Payer: MEDICARE

## 2023-02-28 VITALS
SYSTOLIC BLOOD PRESSURE: 136 MMHG | DIASTOLIC BLOOD PRESSURE: 80 MMHG | BODY MASS INDEX: 35.96 KG/M2 | WEIGHT: 195.4 LBS | HEART RATE: 100 BPM | HEIGHT: 62 IN

## 2023-02-28 DIAGNOSIS — I48.91 ATRIAL FIBRILLATION, UNSPECIFIED TYPE (HCC): ICD-10-CM

## 2023-02-28 DIAGNOSIS — I48.0 PAROXYSMAL A-FIB (HCC): ICD-10-CM

## 2023-02-28 DIAGNOSIS — I51.89 DIASTOLIC DYSFUNCTION: ICD-10-CM

## 2023-02-28 DIAGNOSIS — N18.32 STAGE 3B CHRONIC KIDNEY DISEASE (HCC): ICD-10-CM

## 2023-02-28 DIAGNOSIS — I48.91 ATRIAL FIBRILLATION, UNSPECIFIED TYPE (HCC): Primary | ICD-10-CM

## 2023-02-28 DIAGNOSIS — I10 HYPERTENSION, ESSENTIAL: ICD-10-CM

## 2023-02-28 DIAGNOSIS — E66.01 SEVERE OBESITY (BMI 35.0-39.9) WITH COMORBIDITY (HCC): ICD-10-CM

## 2023-02-28 DIAGNOSIS — I50.23 CHF NYHA CLASS II, ACUTE ON CHRONIC, SYSTOLIC (HCC): ICD-10-CM

## 2023-02-28 DIAGNOSIS — E78.00 PURE HYPERCHOLESTEROLEMIA: ICD-10-CM

## 2023-02-28 DIAGNOSIS — I72.4 PSEUDOANEURYSM OF LEFT FEMORAL ARTERY (HCC): ICD-10-CM

## 2023-02-28 DIAGNOSIS — E87.6 HYPOKALEMIA: ICD-10-CM

## 2023-02-28 LAB
ANION GAP SERPL CALCULATED.3IONS-SCNC: 8 MMOL/L (ref 9–17)
BUN SERPL-MCNC: 17 MG/DL (ref 8–23)
BUN/CREAT BLD: 14 (ref 9–20)
CALCIUM SERPL-MCNC: 9.7 MG/DL (ref 8.6–10.4)
CHLORIDE SERPL-SCNC: 108 MMOL/L (ref 98–107)
CO2 SERPL-SCNC: 28 MMOL/L (ref 20–31)
CREAT SERPL-MCNC: 1.24 MG/DL (ref 0.5–0.9)
GFR SERPL CREATININE-BSD FRML MDRD: 43 ML/MIN/1.73M2
GLUCOSE SERPL-MCNC: 109 MG/DL (ref 70–99)
POTASSIUM SERPL-SCNC: 4.1 MMOL/L (ref 3.7–5.3)
SODIUM SERPL-SCNC: 144 MMOL/L (ref 135–144)

## 2023-02-28 PROCEDURE — 1123F ACP DISCUSS/DSCN MKR DOCD: CPT | Performed by: INTERNAL MEDICINE

## 2023-02-28 PROCEDURE — 93005 ELECTROCARDIOGRAM TRACING: CPT | Performed by: INTERNAL MEDICINE

## 2023-02-28 PROCEDURE — 99214 OFFICE O/P EST MOD 30 MIN: CPT | Performed by: INTERNAL MEDICINE

## 2023-02-28 PROCEDURE — 80048 BASIC METABOLIC PNL TOTAL CA: CPT

## 2023-02-28 PROCEDURE — 3075F SYST BP GE 130 - 139MM HG: CPT | Performed by: INTERNAL MEDICINE

## 2023-02-28 PROCEDURE — G8484 FLU IMMUNIZE NO ADMIN: HCPCS | Performed by: INTERNAL MEDICINE

## 2023-02-28 PROCEDURE — 1036F TOBACCO NON-USER: CPT | Performed by: INTERNAL MEDICINE

## 2023-02-28 PROCEDURE — G8417 CALC BMI ABV UP PARAM F/U: HCPCS | Performed by: INTERNAL MEDICINE

## 2023-02-28 PROCEDURE — 3079F DIAST BP 80-89 MM HG: CPT | Performed by: INTERNAL MEDICINE

## 2023-02-28 PROCEDURE — 36415 COLL VENOUS BLD VENIPUNCTURE: CPT

## 2023-02-28 PROCEDURE — G8427 DOCREV CUR MEDS BY ELIG CLIN: HCPCS | Performed by: INTERNAL MEDICINE

## 2023-02-28 PROCEDURE — 99212 OFFICE O/P EST SF 10 MIN: CPT | Performed by: INTERNAL MEDICINE

## 2023-02-28 PROCEDURE — 93010 ELECTROCARDIOGRAM REPORT: CPT | Performed by: INTERNAL MEDICINE

## 2023-02-28 PROCEDURE — 1090F PRES/ABSN URINE INCON ASSESS: CPT | Performed by: INTERNAL MEDICINE

## 2023-02-28 PROCEDURE — G8399 PT W/DXA RESULTS DOCUMENT: HCPCS | Performed by: INTERNAL MEDICINE

## 2023-02-28 NOTE — PROGRESS NOTES
Today's Date: 2/28/2023  Patient's Name: Ciara Barry  Patient's age: 79 y. o., 1940    CC: follow up for PAF   HPI:  Ciara Barry  is here for follow-up. She underwent cryoablation for atrial fibrillation in February 2022 at [facility]. She was maintaining normal sinus rhythm until one month ago when she feels she flipped back to atrial fib. Today, she is in atrial fibrillation with RVR. Initial HR was in 120's, later 100-105 during examination. She denies any chest pain or dyspnea otherwise. No lower extremity edema. Past Medical History:   has a past medical history of Arthritis, Cervical spine degeneration, Chronic renal insufficiency, stage II (mild), Colon polyps, Constipation, Cystocele, Diverticulitis of colon, Dyspepsia, GERD (gastroesophageal reflux disease), GI bleed, Head ache, Hyperlipidemia, Hypertension, Multiple melanocytic nevi, Mumps, Over weight, Panic attacks, Paroxysmal atrial fibrillation ([address] Positive cardiac stress test, Shingles, and Tachycardia. Past Surgical History:   has a past surgical history that includes back surgery (1985, 1986); Colonoscopy (2006, 2009); colectomy (10/2006); Uterine fibroid embolization (10/2006); bladder repair (05/06/2009); Colonoscopy (2016); Colonoscopy (03/2019); Cardiac catheterization (11/28/2000); Cardiac catheterization (03/19/2021); transesophageal echocardiogram (06/11/2021); Cardioversion (06/11/2021); pr biopsy breast open incisional (Right, 1994); pr puncture aspiration cyst breast (Right); Breast surgery (Right, 1994); and [facility] STEREO BREAST BX W LOC DEVICE 1ST LESION RIGHT (Right, 7/12/2021). Home Medications:  Prior to Admission medications    Medication Sig Start Date End Date Taking?  Authorizing Provider   amiodarone (CORDARONE) 200 MG tablet Take tabs twice daily for one week followed by 1 tab twice daily thereafter 1/26/23  Yes Cristy Harada, MD   BIOTIN PO Take 2,500 mg by mouth in the morning and at bedtime   Yes Historical Provider, MD   simvastatin (ZOCOR) 20 MG tablet take 1 tablet by mouth at bedtime 11/28/22  Yes Raoul Gottlieb DO   furosemide (LASIX) 20 MG tablet take 1 tablet by mouth once daily 10/24/22  Yes Raoul Gottlieb DO   anastrozole (ARIMIDEX) 1 MG tablet Take 1 mg by mouth in the morning. 6/28/22  Yes Historical Provider, MD   calcium carbonate (OSCAL) 500 MG TABS tablet Take 500 mg by mouth in the morning and 500 mg before bedtime. Yes Historical Provider, MD   metoprolol succinate (TOPROL XL) 50 MG extended release tablet daily 1/10/22  Yes Historical Provider, MD   apixaban (ELIQUIS) 2.5 MG TABS tablet Take 1 tablet by mouth 2 times daily 10/19/21  Yes Blaise Gottlieb DO   acetaminophen (TYLENOL) 325 MG tablet Take 650 mg by mouth every 6 hours as needed for Pain   Yes Historical Provider, MD   Cholecalciferol (VITAMIN D3) 1000 units CAPS Take by mouth in the morning and at bedtime   Yes Historical Provider, MD   Cyanocobalamin (B-12) 1000 MCG CAPS Take 1 tablet by mouth daily Indications: High Binding Capacity of Iron to Red Blood Cells    Yes Historical Provider, MD   polyethylene glycol (GLYCOLAX) powder Take 17 g by mouth daily    Yes Historical Provider, MD       Allergies:  Latex, Adhesive tape, Codeine, Morphine, and Other    Social History:   reports that she has never smoked. She has never used smokeless tobacco. She reports that she does not drink alcohol and does not use drugs. Family History: family history includes Emphysema in her father; Heart Disease in her sister; High Blood Pressure in her mother; Osteoporosis in her sister. No h/o sudden cardiac death. No for premature CAD      REVIEW OF SYSTEMS:    Constitutional: there has been mild decline in functional capacity due to orthostatic symptoms  Eyes: No visual changes or diplopia. No scleral icterus. ENT: No Headaches, hearing loss or vertigo. No mouth sores or sore throat.   Cardiovascular: see above  Respiratory: see above  Gastrointestinal: No abdominal pain, appetite loss, blood in stools. Genitourinary: No dysuria, trouble voiding, or hematuria. Musculoskeletal:  No gait disturbance, No weakness or joint complaints. Integumentary: No rash or pruritis. Neurological: No headache or diplopia. No tingling  Psychiatric: No anxiety, or depression. Endocrine: No temperature intolerance. Hematologic/Lymphatic: No abnormal bruising or bleeding, blood clots or swollen lymph nodes. Allergic/Immunologic: No nasal congestion or hives. PHYSICAL EXAM:      Vitals:    02/28/23 0843   BP: 136/80   Pulse: 100     Constitutional and General Appearance: alert, cooperative, no distress and appears stated age  [de-identified]: PERRL, no cervical lymphadenopathy. No masses palpable. Normal oral mucosa  Respiratory:  Normal excursion and expansion without use of accessory muscles  Resp Auscultation: Good respiratory effort. No for increased work of breathing. On auscultation: clear to auscultation bilaterally  Cardiovascular:  Heart tones are irregularly irregular  Jugular venous pulsation Normal  The carotid upstroke is normal in amplitude and contour without delay or bruit   Abdomen:   soft  Bowel sounds present  Extremities:   No edema  Neurological:  Alert and oriented.       Cardiac Data:  EKG 11/17/2022 normal sinus rhythm, normal ECG    ECG 1/26/2023: atrial fib with RVR, nonspecific ST- depressions       Labs:   LABS  Lab Results   Component Value Date    CHOL 183 12/09/2022    TRIG 57 12/09/2022    HDL 91 12/09/2022    LDLCHOLESTEROL 81 12/09/2022    VLDL NOT REPORTED 07/20/2020    CHOLHDLRATIO 2.0 12/09/2022       Lab Results   Component Value Date     (H) 12/09/2022    K 4.4 12/09/2022     (H) 12/09/2022    CO2 28 12/09/2022    BUN 22 12/09/2022    CREATININE 1.28 (H) 12/09/2022    GLUCOSE 101 (H) 12/09/2022    CALCIUM 9.8 12/09/2022    PROT 6.4 12/09/2022    LABALBU 3.8 12/09/2022    BILITOT 0.7 12/09/2022    ALKPHOS 47 12/09/2022    AST 15 12/09/2022    ALT 9 12/09/2022    LABGLOM 42 (L) 12/09/2022    GFRAA 39 (L) 08/08/2022       Nuclear stress test 6/11/19  1. Very small area with mild ischemia laterally. 2. LVEF 70%. Nuclear stress test 11/30/2020  1. Small area of apical-lateral ischemia. 2.  LVEF 70%. Cardiac cath 3/19/21  LMCA: Normal 0% stenosis. LAD: Mild irregularities 10-20%. LCx: Large, dominant, patent and normal.  OM is large and normal  LPDA/LPL are small with mild disease  RCA Small. Nondominant and could not be engaged, possibly occluded. JOANNA/CV 6/11/2021  1. A JOANNA was performed without complications. 2. Normal Lv size with normal systolic function. Estimated LVEF 55%  3. No thrombus or valvular vegetation identified  4. Moderate MR and moderate AI. CV to NSR with single 200 J shock. Cryoablation for AFib in 02/2022 at [address]:        Paroxysmal Atrial Fibrillation with history of JOANNA/CV in 2021 followed by recurrence, status post cryoablation for pulmonary vein isolation in February 2022, now back in atrial fibrillation likely since one month,CHADS-VASc: 4. Cont amio at 200 BID. Rate controlled afib. Hypokalemia- check BMP now  Mild nonobstructive CAD on cath (march 2021)   HTN- stable chronic, optimize meds  HLP- stable chronic, optimize meds  Preserved LV systolic function.   Moderate AI and mild to moderate MR on JOANNA, stable chronic, optimize meds  Hx of GI bleed hospitalized at [facility] followed by colonoscopy by Dr. Sarai Corey noted to have AV malformation and benign polyps by biopsy and anticoagulation was discontinued  Diastolic dysfunction: no signs of volume overload, stable chronic, optimize meds  CKD (last Cr1.5) - nephro following, stable chronic, optimize meds  Patient suggested to go to ED in case of worsening palpitations/dyspnea/CHF  Continue Lopressor and Eliquis  Dose of Eliquis adjusted for age greater than [de-identified] and CKD  On lasix 20 mg qd with no signs of volume overload on examination. Thank you for allowing me to participate in the care of this patient, please do not hesitate to call if you have any questions. Kayy Elder, [id number] [facility]  [facility]. [facility]  [phone]

## 2023-03-28 NOTE — TELEPHONE ENCOUNTER
[place] called requesting a refill of the below medication which has been pended for you:     Requested Prescriptions     Pending Prescriptions Disp Refills    apixaban (ELIQUIS) 2.5 MG TABS tablet 180 tablet 3     Sig: Take 1 tablet by mouth 2 times daily       Last Appointment Date: 1/26/2023  Next Appointment Date: 9/14/2023    Allergies   Allergen Reactions    Latex Rash    Adhesive Tape      rash    Codeine Other (See Comments)     nightmares    Morphine Other (See Comments)     Nightmares      Other      Bee stings - swelling       Pharmacy:  [facility]

## 2023-03-30 ENCOUNTER — TELEPHONE (OUTPATIENT)
Dept: CARDIOLOGY | Age: 83
End: 2023-03-30

## 2023-03-30 NOTE — TELEPHONE ENCOUNTER
Jose HILL coming in the am to go over her medications. Pt states she has a bottle of eliquis that are 5 mg BID that she was taking last and the prescription she has now that was just picked up is for 2.5 mg BID. Pt has amlodipine in her med list for 5 mg but states she does not have that medication nor does she think she takes it.     Last Appt:  2/28/2023  Next Appt:   9/14/2023  Med verified in Epic

## 2023-03-31 NOTE — TELEPHONE ENCOUNTER
Patient came to office to clarify medications. Went through patient's medications and our med list here. Patient had been taking Eliquis 5mg, which a Aroldo Pang filled. Per our records here pt is to be on Eliquis 2.5mg twice daily. Pt stated she just filled the 2.5mg and will start taking that instead of the 5mg. Pt's other medications on list were accurate. Pt had no further questions at the time.

## 2023-05-17 RX ORDER — AMIODARONE HYDROCHLORIDE 200 MG/1
200 TABLET ORAL 2 TIMES DAILY
Qty: 180 TABLET | Refills: 2 | Status: SHIPPED | OUTPATIENT
Start: 2023-05-17 | End: 2023-05-17

## 2023-06-19 ENCOUNTER — OFFICE VISIT (OUTPATIENT)
Dept: FAMILY MEDICINE CLINIC | Age: 83
End: 2023-06-19
Payer: MEDICARE

## 2023-06-19 VITALS
SYSTOLIC BLOOD PRESSURE: 140 MMHG | DIASTOLIC BLOOD PRESSURE: 82 MMHG | BODY MASS INDEX: 36.36 KG/M2 | OXYGEN SATURATION: 97 % | HEART RATE: 101 BPM | HEIGHT: 62 IN | WEIGHT: 197.6 LBS

## 2023-06-19 DIAGNOSIS — Z13.6 ENCOUNTER FOR LIPID SCREENING FOR CARDIOVASCULAR DISEASE: Primary | ICD-10-CM

## 2023-06-19 DIAGNOSIS — Z13.220 ENCOUNTER FOR LIPID SCREENING FOR CARDIOVASCULAR DISEASE: Primary | ICD-10-CM

## 2023-06-19 DIAGNOSIS — I10 ESSENTIAL HYPERTENSION: ICD-10-CM

## 2023-06-19 PROCEDURE — 1123F ACP DISCUSS/DSCN MKR DOCD: CPT

## 2023-06-19 PROCEDURE — 99213 OFFICE O/P EST LOW 20 MIN: CPT

## 2023-06-19 PROCEDURE — G8427 DOCREV CUR MEDS BY ELIG CLIN: HCPCS

## 2023-06-19 PROCEDURE — G8417 CALC BMI ABV UP PARAM F/U: HCPCS

## 2023-06-19 PROCEDURE — G8399 PT W/DXA RESULTS DOCUMENT: HCPCS

## 2023-06-19 PROCEDURE — 1036F TOBACCO NON-USER: CPT

## 2023-06-19 PROCEDURE — 3074F SYST BP LT 130 MM HG: CPT

## 2023-06-19 PROCEDURE — 3078F DIAST BP <80 MM HG: CPT

## 2023-06-19 PROCEDURE — 1090F PRES/ABSN URINE INCON ASSESS: CPT

## 2023-06-19 PROCEDURE — 99212 OFFICE O/P EST SF 10 MIN: CPT

## 2023-06-19 SDOH — ECONOMIC STABILITY: FOOD INSECURITY: WITHIN THE PAST 12 MONTHS, YOU WORRIED THAT YOUR FOOD WOULD RUN OUT BEFORE YOU GOT MONEY TO BUY MORE.: NEVER TRUE

## 2023-06-19 SDOH — ECONOMIC STABILITY: HOUSING INSECURITY
IN THE LAST 12 MONTHS, WAS THERE A TIME WHEN YOU DID NOT HAVE A STEADY PLACE TO SLEEP OR SLEPT IN A SHELTER (INCLUDING NOW)?: NO

## 2023-06-19 SDOH — ECONOMIC STABILITY: FOOD INSECURITY: WITHIN THE PAST 12 MONTHS, THE FOOD YOU BOUGHT JUST DIDN'T LAST AND YOU DIDN'T HAVE MONEY TO GET MORE.: NEVER TRUE

## 2023-06-19 SDOH — ECONOMIC STABILITY: INCOME INSECURITY: HOW HARD IS IT FOR YOU TO PAY FOR THE VERY BASICS LIKE FOOD, HOUSING, MEDICAL CARE, AND HEATING?: NOT HARD AT ALL

## 2023-06-19 ASSESSMENT — PATIENT HEALTH QUESTIONNAIRE - PHQ9
SUM OF ALL RESPONSES TO PHQ QUESTIONS 1-9: 0
2. FEELING DOWN, DEPRESSED OR HOPELESS: 0
SUM OF ALL RESPONSES TO PHQ QUESTIONS 1-9: 0
SUM OF ALL RESPONSES TO PHQ9 QUESTIONS 1 & 2: 0
1. LITTLE INTEREST OR PLEASURE IN DOING THINGS: 0
SUM OF ALL RESPONSES TO PHQ QUESTIONS 1-9: 0
SUM OF ALL RESPONSES TO PHQ QUESTIONS 1-9: 0

## 2023-06-19 NOTE — PROGRESS NOTES
[address] [facility]  (:  1940) is a 79 y.o. female,Established patient, here for evaluation of the following chief complaint(s):  Hypertension (Pt is here for a 6 month f/u. )         ASSESSMENT/PLAN:  1. Encounter for lipid screening for cardiovascular disease  -     Lipid, Fasting; Future  2. Essential hypertension  Assessment & Plan:   At goal, continue current medications and continue current treatment plan continue Lasix 20 mg tablets 1 tablet by mouth daily, Toprol XL 50 mg 1 tablet by mouth daily, amiodarone 200 mg 1 capsule by mouth daily. History of A-fib, denies chest pain, shortness of breath feeling of heart racing. Blood pressure is slightly above goal range however states it is in goal range at home. Encouraged to monitor blood pressure and report these to the office. We will recheck CMP at next office visit if not done prior to by different provider. Return in about 6 months (around 2023). Subjective   SUBJECTIVE/OBJECTIVE:  Hypertension  This is a chronic problem. The current episode started more than 1 year ago. The problem is unchanged. The problem is controlled. Pertinent negatives include no anxiety, chest pain, headaches, neck pain, palpitations or shortness of breath. There are no associated agents to hypertension. Past treatments include beta blockers and diuretics. The current treatment provides significant improvement. Review of Systems   Constitutional:  Negative for chills, fatigue, fever and unexpected weight change. HENT:  Negative for congestion, ear pain, rhinorrhea, sinus pressure and sinus pain. Eyes:  Negative for discharge, itching and visual disturbance. Respiratory:  Negative for cough, chest tightness, shortness of breath and wheezing. Cardiovascular:  Negative for chest pain, palpitations and leg swelling. Gastrointestinal:  Negative for abdominal pain, constipation, diarrhea and nausea.    Endocrine: Negative for cold intolerance,

## 2023-06-22 ASSESSMENT — ENCOUNTER SYMPTOMS
COUGH: 0
SINUS PAIN: 0
ABDOMINAL PAIN: 0
RHINORRHEA: 0
SHORTNESS OF BREATH: 0
WHEEZING: 0
COLOR CHANGE: 0
EYE DISCHARGE: 0
DIARRHEA: 0
NAUSEA: 0
BACK PAIN: 0
CONSTIPATION: 0
EYE ITCHING: 0
CHEST TIGHTNESS: 0
SINUS PRESSURE: 0

## 2023-06-22 NOTE — ASSESSMENT & PLAN NOTE
At goal, continue current medications and continue current treatment plan continue Lasix 20 mg tablets 1 tablet by mouth daily, Toprol XL 50 mg 1 tablet by mouth daily, amiodarone 200 mg 1 capsule by mouth daily. History of A-fib, denies chest pain, shortness of breath feeling of heart racing. Blood pressure is slightly above goal range however states it is in goal range at home. Encouraged to monitor blood pressure and report these to the office. We will recheck CMP at next office visit if not done prior to by different provider.

## 2023-06-30 DIAGNOSIS — Z86.39 HISTORY OF LOW POTASSIUM: Primary | ICD-10-CM

## 2023-06-30 RX ORDER — POTASSIUM CHLORIDE 20 MEQ/1
20 TABLET, EXTENDED RELEASE ORAL DAILY
Qty: 90 TABLET | Refills: 3 | OUTPATIENT
Start: 2023-06-30 | End: 2023-09-28

## 2023-07-03 ENCOUNTER — TELEPHONE (OUTPATIENT)
Dept: FAMILY MEDICINE CLINIC | Age: 83
End: 2023-07-03

## 2023-07-03 DIAGNOSIS — Z86.39 HISTORY OF LOW POTASSIUM: Primary | ICD-10-CM

## 2023-07-03 RX ORDER — POTASSIUM CHLORIDE 20 MEQ/1
20 TABLET, EXTENDED RELEASE ORAL DAILY
Qty: 90 TABLET | Refills: 1 | Status: SHIPPED | OUTPATIENT
Start: 2023-07-03

## 2023-07-03 NOTE — TELEPHONE ENCOUNTER
Pt is calling requesting her potassium to be refilled as she stated she called on 6/30 and it wasn't filled over the weekend. I do not see this medication on her med list. Please advise.

## 2023-07-11 ENCOUNTER — HOSPITAL ENCOUNTER (OUTPATIENT)
Age: 83
Discharge: HOME | End: 2023-07-11
Payer: MEDICARE

## 2023-07-11 DIAGNOSIS — Z13.220 ENCOUNTER FOR LIPID SCREENING FOR CARDIOVASCULAR DISEASE: ICD-10-CM

## 2023-07-11 DIAGNOSIS — N25.81 SECONDARY HYPERPARATHYROIDISM (HCC): ICD-10-CM

## 2023-07-11 DIAGNOSIS — N18.32 STAGE 3B CHRONIC KIDNEY DISEASE (HCC): ICD-10-CM

## 2023-07-11 DIAGNOSIS — Z13.6 ENCOUNTER FOR LIPID SCREENING FOR CARDIOVASCULAR DISEASE: ICD-10-CM

## 2023-07-11 DIAGNOSIS — I10 ESSENTIAL HYPERTENSION: ICD-10-CM

## 2023-07-11 LAB
25(OH)D3 SERPL-MCNC: 56.6 NG/ML
ALBUMIN SERPL-MCNC: 3.9 G/DL (ref 3.5–5.2)
ANION GAP SERPL CALCULATED.3IONS-SCNC: 10 MMOL/L (ref 9–17)
BACTERIA URNS QL MICRO: ABNORMAL
BILIRUB UR QL STRIP: NEGATIVE
BUN SERPL-MCNC: 18 MG/DL (ref 8–23)
BUN/CREAT SERPL: 12 (ref 9–20)
CALCIUM SERPL-MCNC: 9.8 MG/DL (ref 8.6–10.4)
CHARACTER UR: ABNORMAL
CHLORIDE SERPL-SCNC: 108 MMOL/L (ref 98–107)
CHOLEST SERPL-MCNC: 175 MG/DL
CHOLESTEROL/HDL RATIO: 2.2
CLARITY UR: CLEAR
CO2 SERPL-SCNC: 27 MMOL/L (ref 20–31)
COLOR UR: YELLOW
CREAT SERPL-MCNC: 1.5 MG/DL (ref 0.5–0.9)
CREAT UR-MCNC: 171.7 MG/DL (ref 28–217)
EPI CELLS #/AREA URNS HPF: ABNORMAL /HPF (ref 0–5)
ERYTHROCYTE [DISTWIDTH] IN BLOOD BY AUTOMATED COUNT: 14.3 % (ref 11.8–14.4)
GFR SERPL CREATININE-BSD FRML MDRD: 34 ML/MIN/1.73M2
GLUCOSE SERPL-MCNC: 98 MG/DL (ref 70–99)
GLUCOSE UR STRIP-MCNC: NEGATIVE MG/DL
HCT VFR BLD AUTO: 43.9 % (ref 36.3–47.1)
HDLC SERPL-MCNC: 79 MG/DL
HGB BLD-MCNC: 14.1 G/DL (ref 11.9–15.1)
HGB UR QL STRIP.AUTO: NEGATIVE
KETONES UR STRIP-MCNC: NEGATIVE MG/DL
LDLC SERPL CALC-MCNC: 80 MG/DL (ref 0–130)
LEUKOCYTE ESTERASE UR QL STRIP: ABNORMAL
MCH RBC QN AUTO: 30.9 PG (ref 25.2–33.5)
MCHC RBC AUTO-ENTMCNC: 32.1 G/DL (ref 25.2–33.5)
MCV RBC AUTO: 96.3 FL (ref 82.6–102.9)
NITRITE UR QL STRIP: NEGATIVE
NRBC BLD-RTO: 0 PER 100 WBC
PH UR STRIP: 7.5 [PH] (ref 5–6)
PHOSPHATE SERPL-MCNC: 3.1 MG/DL (ref 2.6–4.5)
PLATELET # BLD AUTO: 183 K/UL (ref 138–453)
PMV BLD AUTO: 11.4 FL (ref 8.1–13.5)
POTASSIUM SERPL-SCNC: 4.3 MMOL/L (ref 3.7–5.3)
PROT UR STRIP-MCNC: NEGATIVE MG/DL
PTH-INTACT SERPL-MCNC: 75.9 PG/ML (ref 14–72)
RBC # BLD AUTO: 4.56 M/UL (ref 3.95–5.11)
RBC #/AREA URNS HPF: ABNORMAL /HPF (ref 0–4)
SODIUM SERPL-SCNC: 145 MMOL/L (ref 135–144)
SP GR UR STRIP: 1.01 (ref 1.01–1.02)
TOTAL PROTEIN, URINE: 16 MG/DL
TRIGL SERPL-MCNC: 79 MG/DL
URATE SERPL-MCNC: 6.3 MG/DL (ref 2.4–5.7)
UROBILINOGEN UR STRIP-ACNC: NORMAL
WBC #/AREA URNS HPF: ABNORMAL /HPF (ref 0–4)
WBC OTHER # BLD: 6.1 K/UL (ref 3.5–11.3)

## 2023-07-11 PROCEDURE — 82306 VITAMIN D 25 HYDROXY: CPT

## 2023-07-11 PROCEDURE — 85027 COMPLETE CBC AUTOMATED: CPT

## 2023-07-11 PROCEDURE — 84156 ASSAY OF PROTEIN URINE: CPT

## 2023-07-11 PROCEDURE — 80061 LIPID PANEL: CPT

## 2023-07-11 PROCEDURE — 36415 COLL VENOUS BLD VENIPUNCTURE: CPT

## 2023-07-11 PROCEDURE — 82570 ASSAY OF URINE CREATININE: CPT

## 2023-07-11 PROCEDURE — 84550 ASSAY OF BLOOD/URIC ACID: CPT

## 2023-07-11 PROCEDURE — 81001 URINALYSIS AUTO W/SCOPE: CPT

## 2023-07-11 PROCEDURE — 83970 ASSAY OF PARATHORMONE: CPT

## 2023-07-11 PROCEDURE — 80048 BASIC METABOLIC PNL TOTAL CA: CPT

## 2023-07-11 PROCEDURE — 84100 ASSAY OF PHOSPHORUS: CPT

## 2023-07-11 PROCEDURE — 82040 ASSAY OF SERUM ALBUMIN: CPT

## 2023-07-18 ENCOUNTER — OFFICE VISIT (OUTPATIENT)
Dept: NEPHROLOGY | Age: 83
End: 2023-07-18
Payer: MEDICARE

## 2023-07-18 VITALS
SYSTOLIC BLOOD PRESSURE: 124 MMHG | WEIGHT: 195 LBS | DIASTOLIC BLOOD PRESSURE: 70 MMHG | HEART RATE: 58 BPM | HEIGHT: 62 IN | BODY MASS INDEX: 35.88 KG/M2

## 2023-07-18 DIAGNOSIS — N18.32 STAGE 3B CHRONIC KIDNEY DISEASE (HCC): Primary | ICD-10-CM

## 2023-07-18 DIAGNOSIS — N25.81 SECONDARY HYPERPARATHYROIDISM (HCC): ICD-10-CM

## 2023-07-18 DIAGNOSIS — I10 ESSENTIAL HYPERTENSION: ICD-10-CM

## 2023-07-18 PROCEDURE — 3078F DIAST BP <80 MM HG: CPT | Performed by: INTERNAL MEDICINE

## 2023-07-18 PROCEDURE — G8399 PT W/DXA RESULTS DOCUMENT: HCPCS | Performed by: INTERNAL MEDICINE

## 2023-07-18 PROCEDURE — 1123F ACP DISCUSS/DSCN MKR DOCD: CPT | Performed by: INTERNAL MEDICINE

## 2023-07-18 PROCEDURE — 99213 OFFICE O/P EST LOW 20 MIN: CPT | Performed by: INTERNAL MEDICINE

## 2023-07-18 PROCEDURE — G8417 CALC BMI ABV UP PARAM F/U: HCPCS | Performed by: INTERNAL MEDICINE

## 2023-07-18 PROCEDURE — 1090F PRES/ABSN URINE INCON ASSESS: CPT | Performed by: INTERNAL MEDICINE

## 2023-07-18 PROCEDURE — G8427 DOCREV CUR MEDS BY ELIG CLIN: HCPCS | Performed by: INTERNAL MEDICINE

## 2023-07-18 PROCEDURE — 1036F TOBACCO NON-USER: CPT | Performed by: INTERNAL MEDICINE

## 2023-07-18 PROCEDURE — 3074F SYST BP LT 130 MM HG: CPT | Performed by: INTERNAL MEDICINE

## 2023-07-18 NOTE — PROGRESS NOTES
(gastroesophageal reflux disease)     GI bleed Jan 2016    Head ache     Hyperlipidemia     Hypertension     Multiple melanocytic nevi     Mumps     Over weight     Panic attacks     history of     Paroxysmal atrial fibrillation (HCC)     Positive cardiac stress test 03/2021    Shingles     history of     Tachycardia     episodic       Allergies   Allergen Reactions    Latex Rash    Adhesive Tape      rash    Amiodarone     Codeine Other (See Comments)     nightmares    Morphine Other (See Comments)     Nightmares      Other      Bee stings - swelling       Social History     Socioeconomic History    Marital status:      Spouse name: Not on file    Number of children: Not on file    Years of education: Not on file    Highest education level: Not on file   Occupational History    Not on file   Tobacco Use    Smoking status: Never    Smokeless tobacco: Never    Tobacco comments:     shivani rrt 7/15/17   Vaping Use    Vaping Use: Never used   Substance and Sexual Activity    Alcohol use: No    Drug use: No    Sexual activity: Yes     Partners: Male   Other Topics Concern    Not on file   Social History Narrative    Not on file     Social Determinants of Health     Financial Resource Strain: Low Risk     Difficulty of Paying Living Expenses: Not hard at all   Food Insecurity: No Food Insecurity    Worried About Running Out of Food in the Last Year: Never true    Ran Out of Food in the Last Year: Never true   Transportation Needs: Unknown    Lack of Transportation (Medical): Not on file    Lack of Transportation (Non-Medical):  No   Physical Activity: Not on file   Stress: Not on file   Social Connections: Not on file   Intimate Partner Violence: Not on file   Housing Stability: Unknown    Unable to Pay for Housing in the Last Year: Not on file    Number of Places Lived in the Last Year: Not on file    Unstable Housing in the Last Year: No       Family History:        Family History   Problem Relation Age of

## 2023-09-14 ENCOUNTER — OFFICE VISIT (OUTPATIENT)
Dept: CARDIOLOGY | Age: 83
End: 2023-09-14
Payer: MEDICARE

## 2023-09-14 VITALS
HEIGHT: 62 IN | HEART RATE: 103 BPM | WEIGHT: 194 LBS | DIASTOLIC BLOOD PRESSURE: 84 MMHG | SYSTOLIC BLOOD PRESSURE: 132 MMHG | BODY MASS INDEX: 35.7 KG/M2

## 2023-09-14 DIAGNOSIS — E66.09 NON MORBID OBESITY DUE TO EXCESS CALORIES: ICD-10-CM

## 2023-09-14 DIAGNOSIS — I10 HYPERTENSION, ESSENTIAL: ICD-10-CM

## 2023-09-14 DIAGNOSIS — N18.32 STAGE 3B CHRONIC KIDNEY DISEASE (HCC): ICD-10-CM

## 2023-09-14 DIAGNOSIS — I72.4 PSEUDOANEURYSM OF LEFT FEMORAL ARTERY (HCC): ICD-10-CM

## 2023-09-14 DIAGNOSIS — M19.90 ARTHRITIS: ICD-10-CM

## 2023-09-14 DIAGNOSIS — I48.0 PAROXYSMAL ATRIAL FIBRILLATION (HCC): ICD-10-CM

## 2023-09-14 DIAGNOSIS — E78.00 PURE HYPERCHOLESTEROLEMIA: ICD-10-CM

## 2023-09-14 DIAGNOSIS — I48.0 PAROXYSMAL A-FIB (HCC): Primary | ICD-10-CM

## 2023-09-14 PROCEDURE — 1090F PRES/ABSN URINE INCON ASSESS: CPT | Performed by: INTERNAL MEDICINE

## 2023-09-14 PROCEDURE — 3075F SYST BP GE 130 - 139MM HG: CPT | Performed by: INTERNAL MEDICINE

## 2023-09-14 PROCEDURE — G8427 DOCREV CUR MEDS BY ELIG CLIN: HCPCS | Performed by: INTERNAL MEDICINE

## 2023-09-14 PROCEDURE — 3079F DIAST BP 80-89 MM HG: CPT | Performed by: INTERNAL MEDICINE

## 2023-09-14 PROCEDURE — 1123F ACP DISCUSS/DSCN MKR DOCD: CPT | Performed by: INTERNAL MEDICINE

## 2023-09-14 PROCEDURE — G8417 CALC BMI ABV UP PARAM F/U: HCPCS | Performed by: INTERNAL MEDICINE

## 2023-09-14 PROCEDURE — 93005 ELECTROCARDIOGRAM TRACING: CPT | Performed by: INTERNAL MEDICINE

## 2023-09-14 PROCEDURE — 99212 OFFICE O/P EST SF 10 MIN: CPT | Performed by: INTERNAL MEDICINE

## 2023-09-14 PROCEDURE — 1036F TOBACCO NON-USER: CPT | Performed by: INTERNAL MEDICINE

## 2023-09-14 PROCEDURE — 99214 OFFICE O/P EST MOD 30 MIN: CPT | Performed by: INTERNAL MEDICINE

## 2023-09-14 PROCEDURE — G8399 PT W/DXA RESULTS DOCUMENT: HCPCS | Performed by: INTERNAL MEDICINE

## 2023-09-14 PROCEDURE — 93010 ELECTROCARDIOGRAM REPORT: CPT | Performed by: INTERNAL MEDICINE

## 2023-09-14 NOTE — PROGRESS NOTES
succinate (TOPROL XL) 50 MG extended release tablet daily      acetaminophen (TYLENOL) 325 MG tablet Take 2 tablets by mouth every 6 hours as needed for Pain      Cholecalciferol (VITAMIN D3) 1000 units CAPS Take by mouth in the morning and at bedtime      Cyanocobalamin (B-12) 1000 MCG CAPS Take 1 tablet by mouth daily Indications: High Binding Capacity of Iron to Red Blood Cells       polyethylene glycol (GLYCOLAX) powder Take 17 g by mouth daily       No current facility-administered medications for this visit. Patient Active Problem List   Diagnosis    Atrial fibrillation ([address])    Essential hypertension    Back pain    Hyperlipidemia    Arthritis    Colon polyps    Constipation    Cervical spine degeneration    AVM (arteriovenous malformation) of colon    Non morbid obesity due to excess calories    Small bowel obstruction (HCC)    Chronic kidney disease, stage III (moderate) ([facility])    Osteopenia    Chronic renal disease, stage III ([address]) [687881]    Pseudoaneurysm of left femoral artery ([facility])           REVIEW OF SYSTEMS:    Constitutional: there has been no unanticipated weight loss. There's been No change in energy level, No change in activity level. Eyes: No visual changes or diplopia. No scleral icterus. ENT: No Headaches, hearing loss or vertigo. No mouth sores or sore throat. Cardiovascular: per hpi  Respiratory: per hpi  Gastrointestinal: No abdominal pain, appetite loss, blood in stools. No change in bowel or bladder habits. Genitourinary: No dysuria, trouble voiding, or hematuria. Musculoskeletal:  No gait disturbance, No weakness or joint complaints. Integumentary: No rash or pruritis. Neurological: No headache, diplopia, change in muscle strength, numbness or tingling. No change in gait, balance, coordination, mood, affect, memory, mentation, behavior. Psychiatric: No new anxiety or depression. Endocrine: No temperature intolerance. No excessive thirst, fluid intake, or urination.  No

## 2023-09-29 ENCOUNTER — HOSPITAL ENCOUNTER (OUTPATIENT)
Age: 83
Discharge: HOME | End: 2023-09-29
Payer: MEDICARE

## 2023-09-29 LAB
ALBUMIN PERCENT: ABNORMAL %
ALBUMIN SERPL-MCNC: 4 G/DL (ref 3.5–5.2)
ALBUMIN SERPL-MCNC: ABNORMAL G/DL
ALBUMIN/GLOB SERPL: 1.5 {RATIO} (ref 1–2.5)
ALP SERPL-CCNC: 84 U/L (ref 35–104)
ALPHA 2 PERCENT: ABNORMAL %
ALPHA1 GLOB SERPL ELPH-MCNC: ABNORMAL %
ALPHA1 GLOB SERPL ELPH-MCNC: ABNORMAL G/DL
ALPHA2 GLOB SERPL ELPH-MCNC: ABNORMAL G/DL
ALT SERPL-CCNC: 28 U/L (ref 5–33)
ANION GAP SERPL CALCULATED.3IONS-SCNC: 9 MMOL/L (ref 9–17)
AST SERPL-CCNC: 32 U/L
B-GLOBULIN SERPL ELPH-MCNC: ABNORMAL %
B-GLOBULIN SERPL ELPH-MCNC: ABNORMAL G/DL
BASOPHILS # BLD: 0.05 K/UL (ref 0–0.2)
BASOPHILS NFR BLD: 1 % (ref 0–2)
BILIRUB SERPL-MCNC: 0.7 MG/DL (ref 0.3–1.2)
BUN SERPL-MCNC: 16 MG/DL (ref 8–23)
BUN/CREAT SERPL: 11 (ref 9–20)
CALCIUM SERPL-MCNC: 10 MG/DL (ref 8.6–10.4)
CHLORIDE SERPL-SCNC: 106 MMOL/L (ref 98–107)
CO2 SERPL-SCNC: 31 MMOL/L (ref 20–31)
CREAT SERPL-MCNC: 1.5 MG/DL (ref 0.5–0.9)
EOSINOPHIL # BLD: 0.12 K/UL (ref 0–0.44)
EOSINOPHILS RELATIVE PERCENT: 2 % (ref 1–4)
ERYTHROCYTE [DISTWIDTH] IN BLOOD BY AUTOMATED COUNT: 13.8 % (ref 11.8–14.4)
FREE KAPPA/LAMBDA RATIO: 16.02 (ref 0.26–1.65)
GAMMA GLOB SERPL ELPH-MCNC: ABNORMAL G/DL
GAMMA GLOBULIN %: ABNORMAL %
GFR SERPL CREATININE-BSD FRML MDRD: 34 ML/MIN/1.73M2
GLUCOSE SERPL-MCNC: 97 MG/DL (ref 70–99)
HCT VFR BLD AUTO: 43.3 % (ref 36.3–47.1)
HGB BLD-MCNC: 14.2 G/DL (ref 11.9–15.1)
IMM GRANULOCYTES # BLD AUTO: <0.03 K/UL (ref 0–0.3)
IMM GRANULOCYTES NFR BLD: 0 %
KAPPA LC FREE SER-MCNC: 163.4 MG/L (ref 3.7–19.4)
LAMBDA LC FREE SERPL-MCNC: 10.2 MG/L (ref 5.7–26.3)
LYMPHOCYTES NFR BLD: 1.25 K/UL (ref 1.1–3.7)
LYMPHOCYTES RELATIVE PERCENT: 20 % (ref 24–43)
M PROTEIN 2 SERPL ELPH-MCNC: ABNORMAL G/DL
M PROTEIN SERPL ELPH-MCNC: ABNORMAL G/DL
MCH RBC QN AUTO: 31.9 PG (ref 25.2–33.5)
MCHC RBC AUTO-ENTMCNC: 32.8 G/DL (ref 25.2–33.5)
MCV RBC AUTO: 97.3 FL (ref 82.6–102.9)
MONOCYTES NFR BLD: 0.57 K/UL (ref 0.1–1.2)
MONOCYTES NFR BLD: 9 % (ref 3–12)
NEUTROPHILS NFR BLD: 68 % (ref 36–65)
NEUTS SEG NFR BLD: 4.11 K/UL (ref 1.5–8.1)
NRBC BLD-RTO: 0 PER 100 WBC
PATHOLOGIST: ABNORMAL
PLATELET # BLD AUTO: 209 K/UL (ref 138–453)
PMV BLD AUTO: 11.2 FL (ref 8.1–13.5)
POTASSIUM SERPL-SCNC: 4.3 MMOL/L (ref 3.7–5.3)
PROT PATTERN SERPL ELPH-IMP: ABNORMAL
PROT SERPL-MCNC: 6.3 G/DL (ref 6.4–8.3)
PROT SERPL-MCNC: 6.7 G/DL (ref 6.4–8.3)
RBC # BLD AUTO: 4.45 M/UL (ref 3.95–5.11)
SODIUM SERPL-SCNC: 146 MMOL/L (ref 135–144)
TOTAL PROT. SUM,%: ABNORMAL %
TOTAL PROT. SUM: ABNORMAL G/DL
WBC OTHER # BLD: 6.1 K/UL (ref 3.5–11.3)

## 2023-09-29 PROCEDURE — 83521 IG LIGHT CHAINS FREE EACH: CPT

## 2023-09-29 PROCEDURE — 36415 COLL VENOUS BLD VENIPUNCTURE: CPT

## 2023-09-29 PROCEDURE — 80053 COMPREHEN METABOLIC PANEL: CPT

## 2023-09-29 PROCEDURE — 84155 ASSAY OF PROTEIN SERUM: CPT

## 2023-09-29 PROCEDURE — 86334 IMMUNOFIX E-PHORESIS SERUM: CPT

## 2023-09-29 PROCEDURE — 85025 COMPLETE CBC W/AUTO DIFF WBC: CPT

## 2023-09-29 PROCEDURE — 84165 PROTEIN E-PHORESIS SERUM: CPT

## 2023-10-01 ENCOUNTER — HOSPITAL ENCOUNTER (OUTPATIENT)
Age: 83
Setting detail: SPECIMEN
Discharge: HOME | End: 2023-10-01
Payer: MEDICARE

## 2023-10-01 PROCEDURE — 86335 IMMUNFIX E-PHORSIS/URINE/CSF: CPT

## 2023-10-01 PROCEDURE — 84156 ASSAY OF PROTEIN URINE: CPT

## 2023-10-01 PROCEDURE — 84166 PROTEIN E-PHORESIS/URINE/CSF: CPT

## 2023-10-03 LAB
ALBUMIN PERCENT: 62 % (ref 45–65)
ALBUMIN SERPL-MCNC: 3.9 G/DL (ref 3.2–5.2)
ALPHA 2 PERCENT: 14 % (ref 6–13)
ALPHA1 GLOB SERPL ELPH-MCNC: 0.2 G/DL (ref 0.1–0.4)
ALPHA1 GLOB SERPL ELPH-MCNC: 2 % (ref 3–6)
ALPHA2 GLOB SERPL ELPH-MCNC: 0.9 G/DL (ref 0.5–0.9)
B-GLOBULIN SERPL ELPH-MCNC: 0.8 G/DL (ref 0.5–1.1)
B-GLOBULIN SERPL ELPH-MCNC: 12 % (ref 11–19)
GAMMA GLOB SERPL ELPH-MCNC: 0.6 G/DL (ref 0.5–1.5)
GAMMA GLOBULIN %: 10 % (ref 9–20)
INTERPRETATION SERPL IFE-IMP: NORMAL
PATH REV: NORMAL
PATHOLOGIST: ABNORMAL
PROT PATTERN SERPL ELPH-IMP: ABNORMAL
PROT SERPL-MCNC: 6.3 G/DL (ref 6.4–8.3)
TOTAL PROT. SUM,%: 100 % (ref 98–102)
TOTAL PROT. SUM: 6.4 G/DL (ref 6.3–8.2)

## 2023-10-05 LAB
P E INTERPRETATION, U: NORMAL
PATHOLOGIST: NORMAL
SPECIMEN TYPE: NORMAL
SPECIMEN TYPE: NORMAL
SPECIMEN VOL UR: 2850 ML
URINE IFX INTERP: NORMAL
URINE TOTAL PROTEIN: <4 MG/DL
URINE TOTAL PROTEIN: <4 MG/DL

## 2023-10-20 ENCOUNTER — NURSE ONLY (OUTPATIENT)
Dept: FAMILY MEDICINE CLINIC | Age: 83
End: 2023-10-20
Payer: MEDICARE

## 2023-10-20 DIAGNOSIS — Z23 NEED FOR INFLUENZA VACCINATION: Primary | ICD-10-CM

## 2023-10-20 PROCEDURE — 90694 VACC AIIV4 NO PRSRV 0.5ML IM: CPT

## 2023-10-20 NOTE — PROGRESS NOTES
Have you had an allergic reaction to the flu (influenza) shot? no  Are you allergic to eggs or any component of the flu vaccine? no  Do you have a history of Guillain-[place] Syndrome (GBS), which is paralysis after receiving the flu vaccine? no  Are you feeling well today? yes  Flu vaccine given as ordered. Patient tolerated it well. No questions re: VIS information. After obtaining consent, and per orders of Dr. Jayme Ann, injection of FLU VACCINE given in Left deltoid by Beatriz Buerger, LPN. Patient tolerated well. Patient instructed to remain in clinic for 20 minutes afterwards, and to report any adverse reaction immediately.

## 2023-12-19 PROBLEM — D05.11 DUCTAL CARCINOMA IN SITU (DCIS) OF RIGHT BREAST: Status: ACTIVE | Noted: 2023-12-19

## 2023-12-19 PROBLEM — K57.90 DIVERTICULOSIS: Status: ACTIVE | Noted: 2023-12-19

## 2023-12-19 PROBLEM — M11.20 CALCIUM PYROPHOSPHATE DEPOSITION DISEASE (CPPD): Status: ACTIVE | Noted: 2019-05-01

## 2023-12-19 PROBLEM — M17.12 PRIMARY OSTEOARTHRITIS OF LEFT KNEE: Status: ACTIVE | Noted: 2023-06-27

## 2023-12-19 PROBLEM — N95.0 PMB (POSTMENOPAUSAL BLEEDING): Status: ACTIVE | Noted: 2022-05-04

## 2023-12-19 PROBLEM — I72.9 PSEUDOANEURYSM (HCC): Status: ACTIVE | Noted: 2022-02-26

## 2023-12-19 PROBLEM — M17.11 PRIMARY OSTEOARTHRITIS OF RIGHT KNEE: Status: ACTIVE | Noted: 2019-05-01

## 2023-12-21 PROBLEM — D69.6 THROMBOCYTOPENIA (HCC): Status: ACTIVE | Noted: 2023-12-21

## 2023-12-21 PROBLEM — D68.69 SECONDARY HYPERCOAGULABLE STATE (HCC): Status: ACTIVE | Noted: 2023-12-21

## 2024-01-15 RX ORDER — FUROSEMIDE 20 MG/1
TABLET ORAL
Qty: 180 TABLET | Refills: 3 | Status: SHIPPED | OUTPATIENT
Start: 2024-01-15

## 2024-02-26 ENCOUNTER — OFFICE VISIT (OUTPATIENT)
Dept: FAMILY MEDICINE CLINIC | Age: 84
End: 2024-02-26
Payer: MEDICARE

## 2024-02-26 VITALS — OXYGEN SATURATION: 98 % | DIASTOLIC BLOOD PRESSURE: 72 MMHG | SYSTOLIC BLOOD PRESSURE: 140 MMHG | HEART RATE: 108 BPM

## 2024-02-26 DIAGNOSIS — M79.605 PAIN OF LEFT LOWER EXTREMITY: Primary | ICD-10-CM

## 2024-02-26 DIAGNOSIS — T14.8XXA HEMATOMA AND CONTUSION: ICD-10-CM

## 2024-02-26 PROCEDURE — 99213 OFFICE O/P EST LOW 20 MIN: CPT

## 2024-02-26 PROCEDURE — G8417 CALC BMI ABV UP PARAM F/U: HCPCS

## 2024-02-26 PROCEDURE — 1090F PRES/ABSN URINE INCON ASSESS: CPT

## 2024-02-26 PROCEDURE — 1036F TOBACCO NON-USER: CPT

## 2024-02-26 PROCEDURE — G8399 PT W/DXA RESULTS DOCUMENT: HCPCS

## 2024-02-26 PROCEDURE — G8484 FLU IMMUNIZE NO ADMIN: HCPCS

## 2024-02-26 PROCEDURE — G8427 DOCREV CUR MEDS BY ELIG CLIN: HCPCS

## 2024-02-26 PROCEDURE — 99212 OFFICE O/P EST SF 10 MIN: CPT

## 2024-02-26 PROCEDURE — 3077F SYST BP >= 140 MM HG: CPT

## 2024-02-26 PROCEDURE — 3078F DIAST BP <80 MM HG: CPT

## 2024-02-26 PROCEDURE — 1123F ACP DISCUSS/DSCN MKR DOCD: CPT

## 2024-02-26 ASSESSMENT — PATIENT HEALTH QUESTIONNAIRE - PHQ9
2. FEELING DOWN, DEPRESSED OR HOPELESS: 0
1. LITTLE INTEREST OR PLEASURE IN DOING THINGS: 0
SUM OF ALL RESPONSES TO PHQ9 QUESTIONS 1 & 2: 0
SUM OF ALL RESPONSES TO PHQ QUESTIONS 1-9: 0

## 2024-02-26 NOTE — PROGRESS NOTES
[facility]  [address] [id number]  Dept: [phone]    Date of Service:  2/26/2024    Anum Oliveira is a 83 y.o. female who presents in office today with Self.    Chief Complaint   Patient presents with    Leg Pain     Patient was getting in the truck and her lower left leg hit the running board. It is now swollen and bruised.         Diagnoses / Plan:   1. Pain of left lower extremity  -     XR TIBIA FIBULA LEFT (2 VIEWS); Future  2. Hematoma and contusion   -Hematoma noted to distal portion of tibia.  No crepitus, or bony deformations other than just overlying swelling on top of the tibia with some bruising.  She is having difficulty ambulating however cannot tolerate weight will get x-ray to rule out acute fractures.  RICE which would include rest, elevate, ice.  She may use crutches until x-ray.  Office will call with further directions at that time.  Use Tylenol for pain as she is on blood thinners.  Tylenol 650 every 4-6 hours as needed for pain.      Encouraged symptomatic treatment, rest, increase oral fluid intake.  Follow-up for worsening or persistent symptoms.  Patient verbalizes understanding regarding plan of care and all questions answered.    Return if symptoms worsen or fail to improve.     Subjective:   History of Present Illness:  [place] is here today with her  she struck the distal portion of tibia on a running board of a car getting into the car.  This happened approximately 3 to 4 hours prior.  Did bruise immediately, she was ambulating on it and is still currently ambulating on it.  Denies any nausea vomiting after or during the injury.  Does have some swelling overlying the tibia that extends down to ankle.      Current Outpatient Medications   Medication Sig Dispense Refill    furosemide (LASIX) 20 MG tablet take 1 tablet by mouth once daily 180 tablet 3    apixaban (ELIQUIS) 2.5 MG TABS tablet Take 1 tablet by mouth 2 times daily

## 2024-02-29 ASSESSMENT — ENCOUNTER SYMPTOMS
NAUSEA: 0
COUGH: 0
VOMITING: 0
COLOR CHANGE: 0

## 2024-03-09 ENCOUNTER — HOSPITAL ENCOUNTER (INPATIENT)
Age: 84
LOS: 1 days | Discharge: HOME | DRG: 193 | End: 2024-03-10
Attending: EMERGENCY MEDICINE | Admitting: FAMILY MEDICINE
Payer: MEDICARE

## 2024-03-09 ENCOUNTER — APPOINTMENT (OUTPATIENT)
Dept: GENERAL RADIOLOGY | Age: 84
DRG: 193 | End: 2024-03-09
Attending: EMERGENCY MEDICINE
Payer: MEDICARE

## 2024-03-09 DIAGNOSIS — I50.23 HEART FAILURE, SYSTOLIC, ACUTE ON CHRONIC (HCC): Primary | ICD-10-CM

## 2024-03-09 DIAGNOSIS — R07.9 CHEST PAIN, UNSPECIFIED TYPE: ICD-10-CM

## 2024-03-09 DIAGNOSIS — N18.9 CHRONIC KIDNEY DISEASE, UNSPECIFIED CKD STAGE: ICD-10-CM

## 2024-03-09 DIAGNOSIS — L03.116 CELLULITIS OF LEFT LOWER EXTREMITY: ICD-10-CM

## 2024-03-09 DIAGNOSIS — I50.23 ACUTE ON CHRONIC SYSTOLIC CONGESTIVE HEART FAILURE (HCC): ICD-10-CM

## 2024-03-09 PROBLEM — J18.9 PNEUMONIA, UNSPECIFIED ORGANISM: Status: ACTIVE | Noted: 2024-03-09

## 2024-03-09 LAB
ANION GAP SERPL CALCULATED.3IONS-SCNC: 12 MMOL/L (ref 9–17)
BASOPHILS # BLD: 0.04 K/UL (ref 0–0.2)
BASOPHILS NFR BLD: 0 % (ref 0–2)
BNP SERPL-MCNC: 1499 PG/ML
BUN SERPL-MCNC: 23 MG/DL (ref 8–23)
BUN/CREAT SERPL: 16 (ref 9–20)
CALCIUM SERPL-MCNC: 9.7 MG/DL (ref 8.6–10.4)
CHLORIDE SERPL-SCNC: 103 MMOL/L (ref 98–107)
CO2 SERPL-SCNC: 27 MMOL/L (ref 20–31)
CREAT SERPL-MCNC: 1.4 MG/DL (ref 0.5–0.9)
EOSINOPHIL # BLD: <0.03 K/UL (ref 0–0.44)
EOSINOPHILS RELATIVE PERCENT: 0 % (ref 1–4)
ERYTHROCYTE [DISTWIDTH] IN BLOOD BY AUTOMATED COUNT: 14 % (ref 11.8–14.4)
GFR SERPL CREATININE-BSD FRML MDRD: 37 ML/MIN/1.73M2
GLUCOSE SERPL-MCNC: 113 MG/DL (ref 70–99)
HCT VFR BLD AUTO: 39.7 % (ref 36.3–47.1)
HGB BLD-MCNC: 13.1 G/DL (ref 11.9–15.1)
IMM GRANULOCYTES # BLD AUTO: 0.03 K/UL (ref 0–0.3)
IMM GRANULOCYTES NFR BLD: 0 %
LACTATE BLDV-SCNC: 1.8 MMOL/L (ref 0.5–1.9)
LACTATE BLDV-SCNC: 2 MMOL/L (ref 0.5–1.9)
LYMPHOCYTES NFR BLD: 0.99 K/UL (ref 1.1–3.7)
LYMPHOCYTES RELATIVE PERCENT: 11 % (ref 24–43)
MCH RBC QN AUTO: 31.2 PG (ref 25.2–33.5)
MCHC RBC AUTO-ENTMCNC: 33 G/DL (ref 25.2–33.5)
MCV RBC AUTO: 94.5 FL (ref 82.6–102.9)
MONOCYTES NFR BLD: 0.66 K/UL (ref 0.1–1.2)
MONOCYTES NFR BLD: 7 % (ref 3–12)
NEUTROPHILS NFR BLD: 82 % (ref 36–65)
NEUTS SEG NFR BLD: 7.19 K/UL (ref 1.5–8.1)
NRBC BLD-RTO: 0 PER 100 WBC
PLATELET # BLD AUTO: 190 K/UL (ref 138–453)
PMV BLD AUTO: 11.5 FL (ref 8.1–13.5)
POTASSIUM SERPL-SCNC: 3.8 MMOL/L (ref 3.7–5.3)
RBC # BLD AUTO: 4.2 M/UL (ref 3.95–5.11)
SODIUM SERPL-SCNC: 142 MMOL/L (ref 135–144)
TROPONIN I SERPL HS-MCNC: 16 NG/L (ref 0–14)
TSH SERPL DL<=0.05 MIU/L-ACNC: 1.92 UIU/ML (ref 0.3–5)
WBC OTHER # BLD: 8.9 K/UL (ref 3.5–11.3)

## 2024-03-09 PROCEDURE — 87040 BLOOD CULTURE FOR BACTERIA: CPT

## 2024-03-09 PROCEDURE — 0202U NFCT DS 22 TRGT SARS-COV-2: CPT

## 2024-03-09 PROCEDURE — 99285 EMERGENCY DEPT VISIT HI MDM: CPT

## 2024-03-09 PROCEDURE — 6360000002 HC RX W HCPCS: Performed by: EMERGENCY MEDICINE

## 2024-03-09 PROCEDURE — 6360000002 HC RX W HCPCS: Performed by: FAMILY MEDICINE

## 2024-03-09 PROCEDURE — 93005 ELECTROCARDIOGRAM TRACING: CPT | Performed by: EMERGENCY MEDICINE

## 2024-03-09 PROCEDURE — 96374 THER/PROPH/DIAG INJ IV PUSH: CPT

## 2024-03-09 PROCEDURE — 99222 1ST HOSP IP/OBS MODERATE 55: CPT

## 2024-03-09 PROCEDURE — 6370000000 HC RX 637 (ALT 250 FOR IP): Performed by: FAMILY MEDICINE

## 2024-03-09 PROCEDURE — 84443 ASSAY THYROID STIM HORMONE: CPT

## 2024-03-09 PROCEDURE — 71046 X-RAY EXAM CHEST 2 VIEWS: CPT

## 2024-03-09 PROCEDURE — 85025 COMPLETE CBC W/AUTO DIFF WBC: CPT

## 2024-03-09 PROCEDURE — 80048 BASIC METABOLIC PNL TOTAL CA: CPT

## 2024-03-09 PROCEDURE — 84484 ASSAY OF TROPONIN QUANT: CPT

## 2024-03-09 PROCEDURE — 2060000000 HC ICU INTERMEDIATE R&B

## 2024-03-09 PROCEDURE — 2580000003 HC RX 258: Performed by: EMERGENCY MEDICINE

## 2024-03-09 PROCEDURE — 83605 ASSAY OF LACTIC ACID: CPT

## 2024-03-09 PROCEDURE — 6370000000 HC RX 637 (ALT 250 FOR IP): Performed by: EMERGENCY MEDICINE

## 2024-03-09 PROCEDURE — 83880 ASSAY OF NATRIURETIC PEPTIDE: CPT

## 2024-03-09 PROCEDURE — 36415 COLL VENOUS BLD VENIPUNCTURE: CPT

## 2024-03-09 PROCEDURE — 2580000003 HC RX 258: Performed by: FAMILY MEDICINE

## 2024-03-09 PROCEDURE — 73590 X-RAY EXAM OF LOWER LEG: CPT

## 2024-03-09 RX ORDER — NICOTINE POLACRILEX 2 MG
2500 GUM BUCCAL 2 TIMES DAILY
Status: DISCONTINUED | OUTPATIENT
Start: 2024-03-09 | End: 2024-03-09

## 2024-03-09 RX ORDER — SODIUM CHLORIDE 0.9 % (FLUSH) 0.9 %
5-40 SYRINGE (ML) INJECTION PRN
Status: DISCONTINUED | OUTPATIENT
Start: 2024-03-09 | End: 2024-03-10 | Stop reason: HOSPADM

## 2024-03-09 RX ORDER — ASPIRIN 81 MG/1
324 TABLET, CHEWABLE ORAL ONCE
Status: COMPLETED | OUTPATIENT
Start: 2024-03-09 | End: 2024-03-09

## 2024-03-09 RX ORDER — LANOLIN ALCOHOL/MO/W.PET/CERES
1000 CREAM (GRAM) TOPICAL DAILY
Status: DISCONTINUED | OUTPATIENT
Start: 2024-03-09 | End: 2024-03-10 | Stop reason: HOSPADM

## 2024-03-09 RX ORDER — SODIUM CHLORIDE 9 MG/ML
INJECTION, SOLUTION INTRAVENOUS PRN
Status: DISCONTINUED | OUTPATIENT
Start: 2024-03-09 | End: 2024-03-10 | Stop reason: HOSPADM

## 2024-03-09 RX ORDER — ACETAMINOPHEN 650 MG/1
650 SUPPOSITORY RECTAL EVERY 6 HOURS PRN
Status: DISCONTINUED | OUTPATIENT
Start: 2024-03-09 | End: 2024-03-10 | Stop reason: HOSPADM

## 2024-03-09 RX ORDER — SODIUM CHLORIDE 0.9 % (FLUSH) 0.9 %
5-40 SYRINGE (ML) INJECTION EVERY 12 HOURS SCHEDULED
Status: DISCONTINUED | OUTPATIENT
Start: 2024-03-09 | End: 2024-03-10 | Stop reason: HOSPADM

## 2024-03-09 RX ORDER — FUROSEMIDE 10 MG/ML
20 INJECTION INTRAMUSCULAR; INTRAVENOUS ONCE
Status: COMPLETED | OUTPATIENT
Start: 2024-03-09 | End: 2024-03-09

## 2024-03-09 RX ORDER — VITAMIN B COMPLEX
1000 TABLET ORAL DAILY
Status: DISCONTINUED | OUTPATIENT
Start: 2024-03-10 | End: 2024-03-10 | Stop reason: HOSPADM

## 2024-03-09 RX ORDER — ATORVASTATIN CALCIUM 10 MG/1
10 TABLET, FILM COATED ORAL NIGHTLY
Status: DISCONTINUED | OUTPATIENT
Start: 2024-03-09 | End: 2024-03-10 | Stop reason: HOSPADM

## 2024-03-09 RX ORDER — POLYETHYLENE GLYCOL 3350 17 G/17G
17 POWDER, FOR SOLUTION ORAL DAILY PRN
Status: DISCONTINUED | OUTPATIENT
Start: 2024-03-09 | End: 2024-03-10 | Stop reason: HOSPADM

## 2024-03-09 RX ORDER — AMIODARONE HYDROCHLORIDE 200 MG/1
200 TABLET ORAL DAILY
Status: DISCONTINUED | OUTPATIENT
Start: 2024-03-09 | End: 2024-03-10 | Stop reason: HOSPADM

## 2024-03-09 RX ORDER — POTASSIUM CHLORIDE 7.45 MG/ML
10 INJECTION INTRAVENOUS PRN
Status: DISCONTINUED | OUTPATIENT
Start: 2024-03-09 | End: 2024-03-10 | Stop reason: HOSPADM

## 2024-03-09 RX ORDER — ANASTROZOLE 1 MG/1
1 TABLET ORAL DAILY
Status: DISCONTINUED | OUTPATIENT
Start: 2024-03-10 | End: 2024-03-10 | Stop reason: HOSPADM

## 2024-03-09 RX ORDER — ONDANSETRON 2 MG/ML
4 INJECTION INTRAMUSCULAR; INTRAVENOUS EVERY 6 HOURS PRN
Status: DISCONTINUED | OUTPATIENT
Start: 2024-03-09 | End: 2024-03-10 | Stop reason: HOSPADM

## 2024-03-09 RX ORDER — CALCIUM CARBONATE 500 MG/1
500 TABLET, CHEWABLE ORAL 2 TIMES DAILY
Status: DISCONTINUED | OUTPATIENT
Start: 2024-03-09 | End: 2024-03-10 | Stop reason: HOSPADM

## 2024-03-09 RX ORDER — FENTANYL CITRATE 50 UG/ML
25 INJECTION, SOLUTION INTRAMUSCULAR; INTRAVENOUS ONCE
Status: DISCONTINUED | OUTPATIENT
Start: 2024-03-09 | End: 2024-03-10 | Stop reason: HOSPADM

## 2024-03-09 RX ORDER — POTASSIUM CHLORIDE 20 MEQ/1
20 TABLET, EXTENDED RELEASE ORAL DAILY
Status: DISCONTINUED | OUTPATIENT
Start: 2024-03-10 | End: 2024-03-10 | Stop reason: HOSPADM

## 2024-03-09 RX ORDER — ACETAMINOPHEN 325 MG/1
650 TABLET ORAL EVERY 6 HOURS PRN
Status: DISCONTINUED | OUTPATIENT
Start: 2024-03-09 | End: 2024-03-10 | Stop reason: HOSPADM

## 2024-03-09 RX ORDER — POTASSIUM CHLORIDE 20 MEQ/1
40 TABLET, EXTENDED RELEASE ORAL PRN
Status: DISCONTINUED | OUTPATIENT
Start: 2024-03-09 | End: 2024-03-10 | Stop reason: HOSPADM

## 2024-03-09 RX ORDER — METOPROLOL SUCCINATE 50 MG/1
50 TABLET, EXTENDED RELEASE ORAL DAILY
Status: DISCONTINUED | OUTPATIENT
Start: 2024-03-10 | End: 2024-03-10 | Stop reason: HOSPADM

## 2024-03-09 RX ORDER — MAGNESIUM SULFATE IN WATER 40 MG/ML
2000 INJECTION, SOLUTION INTRAVENOUS PRN
Status: DISCONTINUED | OUTPATIENT
Start: 2024-03-09 | End: 2024-03-10 | Stop reason: HOSPADM

## 2024-03-09 RX ORDER — FUROSEMIDE 10 MG/ML
20 INJECTION INTRAMUSCULAR; INTRAVENOUS 2 TIMES DAILY
Status: DISCONTINUED | OUTPATIENT
Start: 2024-03-09 | End: 2024-03-10 | Stop reason: HOSPADM

## 2024-03-09 RX ORDER — ONDANSETRON 4 MG/1
4 TABLET, ORALLY DISINTEGRATING ORAL EVERY 8 HOURS PRN
Status: DISCONTINUED | OUTPATIENT
Start: 2024-03-09 | End: 2024-03-10 | Stop reason: HOSPADM

## 2024-03-09 RX ADMIN — ATORVASTATIN CALCIUM 10 MG: 10 TABLET, FILM COATED ORAL at 20:22

## 2024-03-09 RX ADMIN — CEFTRIAXONE 1000 MG: 1 INJECTION, POWDER, FOR SOLUTION INTRAMUSCULAR; INTRAVENOUS at 14:25

## 2024-03-09 RX ADMIN — ASPIRIN 81 MG 324 MG: 81 TABLET ORAL at 12:40

## 2024-03-09 RX ADMIN — FUROSEMIDE 20 MG: 10 INJECTION, SOLUTION INTRAMUSCULAR; INTRAVENOUS at 19:34

## 2024-03-09 RX ADMIN — SODIUM CHLORIDE, PRESERVATIVE FREE 10 ML: 5 INJECTION INTRAVENOUS at 20:23

## 2024-03-09 RX ADMIN — APIXABAN 2.5 MG: 2.5 TABLET, FILM COATED ORAL at 20:22

## 2024-03-09 RX ADMIN — FUROSEMIDE 20 MG: 10 INJECTION, SOLUTION INTRAMUSCULAR; INTRAVENOUS at 14:26

## 2024-03-09 RX ADMIN — AZITHROMYCIN MONOHYDRATE 500 MG: 500 INJECTION, POWDER, LYOPHILIZED, FOR SOLUTION INTRAVENOUS at 16:00

## 2024-03-09 RX ADMIN — ACETAMINOPHEN 650 MG: 325 TABLET ORAL at 22:08

## 2024-03-09 ASSESSMENT — PAIN SCALES - GENERAL
PAINLEVEL_OUTOF10: 0
PAINLEVEL_OUTOF10: 3
PAINLEVEL_OUTOF10: 4

## 2024-03-09 ASSESSMENT — PAIN DESCRIPTION - DIRECTION: RADIATING_TOWARDS: DOES NOT RADIATE

## 2024-03-09 ASSESSMENT — PAIN DESCRIPTION - DESCRIPTORS
DESCRIPTORS: ACHING
DESCRIPTORS: HEAVINESS

## 2024-03-09 ASSESSMENT — PAIN DESCRIPTION - ONSET: ONSET: GRADUAL

## 2024-03-09 ASSESSMENT — LIFESTYLE VARIABLES
HOW OFTEN DO YOU HAVE A DRINK CONTAINING ALCOHOL: NEVER
HOW MANY STANDARD DRINKS CONTAINING ALCOHOL DO YOU HAVE ON A TYPICAL DAY: PATIENT DOES NOT DRINK
HOW OFTEN DO YOU HAVE A DRINK CONTAINING ALCOHOL: NEVER

## 2024-03-09 ASSESSMENT — PAIN DESCRIPTION - FREQUENCY: FREQUENCY: INTERMITTENT

## 2024-03-09 ASSESSMENT — PAIN DESCRIPTION - LOCATION
LOCATION: ARM;SHOULDER
LOCATION: HEAD

## 2024-03-09 ASSESSMENT — PAIN DESCRIPTION - ORIENTATION
ORIENTATION: LEFT
ORIENTATION: MID

## 2024-03-09 ASSESSMENT — PAIN DESCRIPTION - PAIN TYPE: TYPE: ACUTE PAIN

## 2024-03-09 ASSESSMENT — PAIN - FUNCTIONAL ASSESSMENT: PAIN_FUNCTIONAL_ASSESSMENT: ACTIVITIES ARE NOT PREVENTED

## 2024-03-09 NOTE — ED PROVIDER NOTES
No findings of pneumothorax.  Trace bilateral pleural effusions.  Aortic tortuosity.  Mildly prominent hilar and cardiac contours.  No obvious acute fracture.  Joints maintain anatomic alignment.     1. Pulmonary vascular congestion with potential central predominant edema, potentially congestive heart failure given mild cardiomegaly and trace bilateral pleural effusions.  Pneumonia could appear similar, and chronic interstitial changes could contribute to the appearance. 2. At least mild peribronchial cuffing potentially due to pulmonary vascular congestion, reactive airways disease, or bronchitis.      EKG  EKG Interpretation  Interpreted by emergency department physician    Rhythm: normal sinus   Rate: 68  Axis: normal  Ectopy: none  Conduction: normal  ST Segments: depression in  v5, v6, I, and aVl  T Waves: flattening in  multiple  Q Waves: none    Clinical Impression: non-specific EKG. ST depression present from EKG done 9/14/23    All EKG's are interpreted by the Emergency Department Physician whoeither signs or Co-signs this chart in the absence of a cardiologist.    EMERGENCY DEPARTMENT COURSE:  ED Course as of 03/09/24 1501   Sat Mar 09, 2024   1212 JOANNA 02/22:  · No spontaneous echo contrast. Normal left superior, right superior and   right inferior pulmonary vein connection. Pulmonary veins are normal size.   Normal pulmonary venous flow. There is no thrombus in the left atrial   appendage. Bubble study is negative for intracardiac shunt. Severely   dilated left atrium.   · Severely dilated right atrium.   · Left ventricular ejection fraction is estimated at 40%. Moderately   decreased LV function.   · Mild-moderate tricuspid valve regurgitation.  [AO]   1213 Cardiac cath 03/21:  Findings:  LMCA: Normal 0% stenosis.  LAD: Mild irregularities 10-20%.     LCx: Large, dominant, patent and normal.  OM is large and normal  LPDA/LPL are small with mild disease     RCA: Small. Nondominant and could not be

## 2024-03-10 ENCOUNTER — APPOINTMENT (OUTPATIENT)
Dept: GENERAL RADIOLOGY | Age: 84
DRG: 193 | End: 2024-03-10
Payer: MEDICARE

## 2024-03-10 VITALS
WEIGHT: 195.4 LBS | SYSTOLIC BLOOD PRESSURE: 135 MMHG | HEART RATE: 60 BPM | TEMPERATURE: 97.3 F | DIASTOLIC BLOOD PRESSURE: 68 MMHG | BODY MASS INDEX: 35.96 KG/M2 | OXYGEN SATURATION: 95 % | HEIGHT: 62 IN | RESPIRATION RATE: 16 BRPM

## 2024-03-10 LAB
ANION GAP SERPL CALCULATED.3IONS-SCNC: 10 MMOL/L (ref 9–17)
B PARAP IS1001 DNA NPH QL NAA+NON-PROBE: NOT DETECTED
B PERT DNA SPEC QL NAA+PROBE: NOT DETECTED
BASOPHILS # BLD: 0 K/UL (ref 0–0.2)
BASOPHILS NFR BLD: 0 % (ref 0–1)
BUN SERPL-MCNC: 21 MG/DL (ref 8–23)
BUN/CREAT SERPL: 16 (ref 9–20)
C PNEUM DNA NPH QL NAA+NON-PROBE: NOT DETECTED
CALCIUM SERPL-MCNC: 9.2 MG/DL (ref 8.6–10.4)
CHLORIDE SERPL-SCNC: 103 MMOL/L (ref 98–107)
CO2 SERPL-SCNC: 26 MMOL/L (ref 20–31)
CREAT SERPL-MCNC: 1.3 MG/DL (ref 0.5–0.9)
EKG ATRIAL RATE: 68 BPM
EKG P-R INTERVAL: 186 MS
EKG Q-T INTERVAL: 320 MS
EKG QRS DURATION: 92 MS
EKG QTC CALCULATION (BAZETT): 340 MS
EKG R AXIS: 56 DEGREES
EKG T AXIS: 129 DEGREES
EKG VENTRICULAR RATE: 68 BPM
EOSINOPHIL # BLD: 0 K/UL (ref 0–0.4)
EOSINOPHILS RELATIVE PERCENT: 0 % (ref 1–7)
ERYTHROCYTE [DISTWIDTH] IN BLOOD BY AUTOMATED COUNT: 13.9 % (ref 11.8–14.4)
FLUAV RNA NPH QL NAA+NON-PROBE: NOT DETECTED
FLUBV RNA NPH QL NAA+NON-PROBE: NOT DETECTED
GFR SERPL CREATININE-BSD FRML MDRD: 41 ML/MIN/1.73M2
GLUCOSE SERPL-MCNC: 186 MG/DL (ref 70–99)
HADV DNA NPH QL NAA+NON-PROBE: NOT DETECTED
HCOV 229E RNA NPH QL NAA+NON-PROBE: NOT DETECTED
HCOV HKU1 RNA NPH QL NAA+NON-PROBE: NOT DETECTED
HCOV NL63 RNA NPH QL NAA+NON-PROBE: NOT DETECTED
HCOV OC43 RNA NPH QL NAA+NON-PROBE: NOT DETECTED
HCT VFR BLD AUTO: 35.9 % (ref 36.3–47.1)
HGB BLD-MCNC: 11.9 G/DL (ref 11.9–15.1)
HMPV RNA NPH QL NAA+NON-PROBE: NOT DETECTED
HPIV1 RNA NPH QL NAA+NON-PROBE: NOT DETECTED
HPIV2 RNA NPH QL NAA+NON-PROBE: NOT DETECTED
HPIV3 RNA NPH QL NAA+NON-PROBE: NOT DETECTED
HPIV4 RNA NPH QL NAA+NON-PROBE: NOT DETECTED
IMM GRANULOCYTES # BLD AUTO: 0 K/UL (ref 0–0.3)
IMM GRANULOCYTES NFR BLD: 0 %
LYMPHOCYTES NFR BLD: 0.59 K/UL (ref 1–4.8)
LYMPHOCYTES RELATIVE PERCENT: 8 % (ref 16–46)
M PNEUMO DNA NPH QL NAA+NON-PROBE: NOT DETECTED
MCH RBC QN AUTO: 31.1 PG (ref 25.2–33.5)
MCHC RBC AUTO-ENTMCNC: 33.1 G/DL (ref 25.2–33.5)
MCV RBC AUTO: 93.7 FL (ref 82.6–102.9)
MONOCYTES NFR BLD: 0.44 K/UL (ref 0.1–1.2)
MONOCYTES NFR BLD: 6 % (ref 4–11)
MORPHOLOGY: ABNORMAL
MORPHOLOGY: ABNORMAL
NEUTROPHILS NFR BLD: 86 % (ref 43–77)
NEUTS SEG NFR BLD: 6.37 K/UL (ref 1.5–8.1)
NRBC BLD-RTO: 0 PER 100 WBC
PLATELET # BLD AUTO: 164 K/UL (ref 138–453)
PMV BLD AUTO: 11.8 FL (ref 8.1–13.5)
POTASSIUM SERPL-SCNC: 3.5 MMOL/L (ref 3.7–5.3)
RBC # BLD AUTO: 3.83 M/UL (ref 3.95–5.11)
RSV RNA NPH QL NAA+NON-PROBE: NOT DETECTED
RV+EV RNA NPH QL NAA+NON-PROBE: NOT DETECTED
SARS-COV-2 RNA NPH QL NAA+NON-PROBE: NOT DETECTED
SODIUM SERPL-SCNC: 139 MMOL/L (ref 135–144)
SPECIMEN DESCRIPTION: NORMAL
TROPONIN I SERPL HS-MCNC: 16 NG/L (ref 0–14)
WBC OTHER # BLD: 7.4 K/UL (ref 3.5–11.3)

## 2024-03-10 PROCEDURE — 80048 BASIC METABOLIC PNL TOTAL CA: CPT

## 2024-03-10 PROCEDURE — 36415 COLL VENOUS BLD VENIPUNCTURE: CPT

## 2024-03-10 PROCEDURE — 6360000002 HC RX W HCPCS: Performed by: FAMILY MEDICINE

## 2024-03-10 PROCEDURE — 6370000000 HC RX 637 (ALT 250 FOR IP): Performed by: FAMILY MEDICINE

## 2024-03-10 PROCEDURE — 99222 1ST HOSP IP/OBS MODERATE 55: CPT | Performed by: FAMILY MEDICINE

## 2024-03-10 PROCEDURE — 85025 COMPLETE CBC W/AUTO DIFF WBC: CPT

## 2024-03-10 PROCEDURE — 71045 X-RAY EXAM CHEST 1 VIEW: CPT

## 2024-03-10 PROCEDURE — 84484 ASSAY OF TROPONIN QUANT: CPT

## 2024-03-10 PROCEDURE — 94760 N-INVAS EAR/PLS OXIMETRY 1: CPT

## 2024-03-10 PROCEDURE — 2580000003 HC RX 258: Performed by: FAMILY MEDICINE

## 2024-03-10 RX ORDER — AZITHROMYCIN 500 MG/1
500 TABLET, FILM COATED ORAL DAILY
Qty: 1 PACKET | Refills: 0 | Status: SHIPPED | OUTPATIENT
Start: 2024-03-10 | End: 2024-03-13

## 2024-03-10 RX ORDER — CEFUROXIME AXETIL 250 MG/1
250 TABLET ORAL 2 TIMES DAILY
Qty: 14 TABLET | Refills: 0 | Status: SHIPPED | OUTPATIENT
Start: 2024-03-10 | End: 2024-03-17

## 2024-03-10 RX ADMIN — AMIODARONE HYDROCHLORIDE 200 MG: 200 TABLET ORAL at 11:22

## 2024-03-10 RX ADMIN — POTASSIUM CHLORIDE 20 MEQ: 1500 TABLET, EXTENDED RELEASE ORAL at 08:31

## 2024-03-10 RX ADMIN — FUROSEMIDE 20 MG: 10 INJECTION, SOLUTION INTRAMUSCULAR; INTRAVENOUS at 08:30

## 2024-03-10 RX ADMIN — POTASSIUM CHLORIDE 40 MEQ: 1500 TABLET, EXTENDED RELEASE ORAL at 08:32

## 2024-03-10 RX ADMIN — APIXABAN 2.5 MG: 2.5 TABLET, FILM COATED ORAL at 08:31

## 2024-03-10 RX ADMIN — SODIUM CHLORIDE, PRESERVATIVE FREE 10 ML: 5 INJECTION INTRAVENOUS at 08:32

## 2024-03-10 RX ADMIN — CYANOCOBALAMIN TAB 1000 MCG 1000 MCG: 1000 TAB at 08:30

## 2024-03-10 RX ADMIN — ANASTROZOLE 1 MG: 1 TABLET, COATED ORAL at 08:30

## 2024-03-10 RX ADMIN — CHOLECALCIFEROL TAB 25 MCG (1000 UNIT) 1000 UNITS: 25 TAB at 08:30

## 2024-03-10 ASSESSMENT — PAIN SCALES - GENERAL: PAINLEVEL_OUTOF10: 0

## 2024-03-10 NOTE — PLAN OF CARE
Problem: Discharge Planning  Goal: Discharge to home or other facility with appropriate resources  3/10/2024 1231 by Laura Sauceda RN  Outcome: Completed  3/10/2024 0849 by Laura Sauceda RN  Outcome: Progressing  Flowsheets  Taken 3/10/2024 0830 by Laura Sauceda RN  Discharge to home or other facility with appropriate resources:   Identify barriers to discharge with patient and caregiver   Arrange for needed discharge resources and transportation as appropriate   Identify discharge learning needs (meds, wound care, etc)   Refer to discharge planning if patient needs post-hospital services based on physician order or complex needs related to functional status, cognitive ability or social support system  Taken 3/9/2024 2347 by Fartun Candelario RN  Discharge to home or other facility with appropriate resources: Identify barriers to discharge with patient and caregiver  Taken 3/9/2024 1823 by Laura Sauceda RN  Discharge to home or other facility with appropriate resources:   Identify barriers to discharge with patient and caregiver   Arrange for needed discharge resources and transportation as appropriate   Identify discharge learning needs (meds, wound care, etc)   Refer to discharge planning if patient needs post-hospital services based on physician order or complex needs related to functional status, cognitive ability or social support system     Problem: Respiratory - Adult  Goal: Achieves optimal ventilation and oxygenation  3/10/2024 1231 by Laura Sauceda RN  Outcome: Completed  3/10/2024 0849 by Laura Sauceda RN  Outcome: Progressing  Flowsheets  Taken 3/10/2024 0830 by Laura Sauceda RN  Achieves optimal ventilation and oxygenation:   Assess for changes in respiratory status   Assess for changes in mentation and behavior   Position to facilitate oxygenation and minimize respiratory effort   Oxygen supplementation based on oxygen saturation or arterial blood gases   Assess and instruct to

## 2024-03-10 NOTE — DISCHARGE SUMMARY
Discharge Summary    Anum Oliveira Patient Status:  Inpatient    1940 MRN [id number]   Location [facility]  PROGRESSIVE CARE Attending Carol Puntam MD   Hosp Day # 1 PCP Wesly Vasquez, APRN - CNP     Date of Admission: 3/9/2024    Date of Discharge: 3/10/2024    Admitting Diagnosis: Heart failure, systolic, acute on chronic (HCC) [I50.23]  Acute on chronic systolic congestive heart failure (HCC) [I50.23]  Cellulitis of left lower extremity [L03.116]  Chest pain, unspecified type [R07.9]  Chronic kidney disease, unspecified CKD stage [N18.9]    Discharge Diagnosis: Principal Problem:    Heart failure, systolic, acute on chronic (HCC)  Active Problems:    Pneumonia, unspecified organism  Resolved Problems:    * No resolved hospital problems. *  Hematoma and bruising left leg    Reason for Admission/HPI: Shortness of breath with cough and some wheezing h/o Systolic CHF and was s/p ablation for Fib was in NSR    Hospital Course: Patient was on IV rocephin and Zithromax and was on IV lasix her respiratory symptoms have improved not needed supplemental oxygen. Serial troponin was stable ACS ruled out .Patient had a hematoma and bruising of left leg from recent fall.Patient wanted to be discharged and follow up with cardiology outpatient since was already scheduled to see them soon.Echocardiogram was changed to outpatient for tomorrow and have cardiology consult outpatient within the  week.Discharge home on Ceftin and Zithromax    Consultations: Cardiology will be seen outpatient    Procedures: None    Complications: None    Disposition: Home    Discharge Condition: Good    Discharge Medications:      Medication List        START taking these medications      azithromycin 500 MG tablet  Commonly known as: Zithromax  Take 1 tablet by mouth daily for 3 days     cefUROXime 250 MG tablet  Commonly known as: CEFTIN  Take 1 tablet by mouth 2 times daily for 7 days            CONTINUE taking these medications

## 2024-03-10 NOTE — PLAN OF CARE
Problem: Discharge Planning  Goal: Discharge to home or other facility with appropriate resources  Outcome: Progressing  Flowsheets  Taken 3/10/2024 0830 by Laura Sauceda RN  Discharge to home or other facility with appropriate resources:   Identify barriers to discharge with patient and caregiver   Arrange for needed discharge resources and transportation as appropriate   Identify discharge learning needs (meds, wound care, etc)   Refer to discharge planning if patient needs post-hospital services based on physician order or complex needs related to functional status, cognitive ability or social support system  Taken 3/9/2024 2347 by Fartun Candelario RN  Discharge to home or other facility with appropriate resources: Identify barriers to discharge with patient and caregiver  Taken 3/9/2024 1823 by Laura Sauceda RN  Discharge to home or other facility with appropriate resources:   Identify barriers to discharge with patient and caregiver   Arrange for needed discharge resources and transportation as appropriate   Identify discharge learning needs (meds, wound care, etc)   Refer to discharge planning if patient needs post-hospital services based on physician order or complex needs related to functional status, cognitive ability or social support system     Problem: Respiratory - Adult  Goal: Achieves optimal ventilation and oxygenation  Outcome: Progressing  Flowsheets  Taken 3/10/2024 0830 by Laura Sauceda RN  Achieves optimal ventilation and oxygenation:   Assess for changes in respiratory status   Assess for changes in mentation and behavior   Position to facilitate oxygenation and minimize respiratory effort   Oxygen supplementation based on oxygen saturation or arterial blood gases   Assess and instruct to report shortness of breath or any respiratory difficulty   Respiratory therapy support as indicated  Taken 3/9/2024 2347 by Fartun Candelario RN  Achieves optimal ventilation and oxygenation:

## 2024-03-10 NOTE — H&P
HOSPITALIST ADMISSION H&P      REASON FOR ADMISSION:  CHF  ESTIMATED LENGTH OF STAY: >2 midnights, 3-4 days    ATTENDING/ADMITTING PHYSICIAN: Carol Putnam MD  PCP: Wesly Vasquez APRN - CNP    HISTORY OF PRESENT ILLNESS:      The patient is a 83 y.o. female patient of Wesly Vasquez APRN - CNP who presents from ER with c/o Pain to chest, back and shoulder. Patient reports she woke up around 3am with left shoulder and back pain, complained she \"couldn't hardly breathe\" started coughing with clear phlegm and began wheezing. Patient states she fell back to sleep and woke up with wheezing worse that before, states she no longer has the wheezing, shortness of breath, shoulder pain, or back pain. Denies recent exposure to ill contacts.    Hx Afib: on eliquis, currently SR.  In ER:   XR tib/fib Left:   IMPRESSION:  1. Mid to distal lower leg edema with anterior distal lower leg and lateral  ankle posttraumatic contusion/hematoma.  2. Decreased bone mineral density.  The tibia and fibula demonstrate no acute  fracture.    CXR:   IMPRESSION:  1. Pulmonary vascular congestion with potential central predominant edema,  potentially congestive heart failure given mild cardiomegaly and trace  bilateral pleural effusions.  Pneumonia could appear similar, and chronic  interstitial changes could contribute to the appearance.  2. At least mild peribronchial cuffing potentially due to pulmonary vascular  congestion, reactive airways disease, or bronchitis.    EKG:   Normal sinus rhythm--68  Marked ST abnormality, possible lateral subendocardial injury  Abnormal ECG  When compared with ECG of 26-JUL-2021 08:58,  Sinus rhythm has replaced Atrial fibrillation  Criteria for Inferior-posterior infarct are no longer Present  T wave inversion less evident in Anterolateral leads  QT has shortened.    Wounds and LDAs present prior to admission: See media     See below for additional PMH.    Patient [id number]-available

## 2024-03-11 ENCOUNTER — TELEPHONE (OUTPATIENT)
Dept: FAMILY MEDICINE CLINIC | Age: 84
End: 2024-03-11

## 2024-03-11 NOTE — TELEPHONE ENCOUNTER
Care Transitions Initial Follow Up Call    Outreach made within 2 business days of discharge: Yes    Patient: Anum Oliveira Patient : 1940   MRN: [id number]  Reason for Admission: There are no discharge diagnoses documented for the most recent discharge.  Discharge Date: 3/10/24       Spoke with: [place]      Discharge department/facility: [facility] Interactive Patient Contact:  Was patient able to fill all prescriptions: Yes  Was patient instructed to bring all medications to the follow-up visit: Yes  Is patient taking all medications as directed in the discharge summary? Yes  Does patient understand their discharge instructions: Yes  Does patient have questions or concerns that need addressed prior to 7-14 day follow up office visit: no    Scheduled appointment with PCP within 7-14 days    Follow Up  Future Appointments   Date Time Provider Department Center   3/14/2024 10:30 AM Wesly Vasquez APRN - CNP [facility]   3/18/2024  9:00 AM [facility]   3/25/2024  9:15 AM EDEN ECHO 1 [facility] [address] [place]   3/28/2024 10:30 AM [facility]   2024  9:00 AM [facility]   2024  9:50 AM [facility]   2024  9:00 AM Wesly Vasquez APR[facility] - CNP [facility]       Vandana RODNEY Masters, MA

## 2024-03-14 ENCOUNTER — OFFICE VISIT (OUTPATIENT)
Dept: FAMILY MEDICINE CLINIC | Age: 84
End: 2024-03-14

## 2024-03-14 VITALS
OXYGEN SATURATION: 98 % | HEART RATE: 62 BPM | DIASTOLIC BLOOD PRESSURE: 70 MMHG | SYSTOLIC BLOOD PRESSURE: 142 MMHG | HEIGHT: 62 IN | WEIGHT: 191 LBS | BODY MASS INDEX: 35.15 KG/M2

## 2024-03-14 DIAGNOSIS — I50.23 HEART FAILURE, SYSTOLIC, ACUTE ON CHRONIC (HCC): ICD-10-CM

## 2024-03-14 DIAGNOSIS — I48.0 PAROXYSMAL ATRIAL FIBRILLATION (HCC): ICD-10-CM

## 2024-03-14 DIAGNOSIS — N25.81 SECONDARY HYPERPARATHYROIDISM (HCC): ICD-10-CM

## 2024-03-14 DIAGNOSIS — D68.69 SECONDARY HYPERCOAGULABLE STATE (HCC): ICD-10-CM

## 2024-03-14 DIAGNOSIS — Z09 HOSPITAL DISCHARGE FOLLOW-UP: Primary | ICD-10-CM

## 2024-03-14 DIAGNOSIS — N18.32 STAGE 3B CHRONIC KIDNEY DISEASE (HCC): ICD-10-CM

## 2024-03-14 PROBLEM — R06.02 SHORTNESS OF BREATH: Status: ACTIVE | Noted: 2018-06-05

## 2024-03-14 LAB
MICROORGANISM SPEC CULT: NORMAL
MICROORGANISM SPEC CULT: NORMAL
SERVICE CMNT-IMP: NORMAL
SERVICE CMNT-IMP: NORMAL
SPECIMEN DESCRIPTION: NORMAL
SPECIMEN DESCRIPTION: NORMAL

## 2024-03-14 NOTE — PROGRESS NOTES
Post-Discharge Transitional Care Follow Up      Anum Oliveira   YOB: 1940    Date of Office Visit:  3/14/2024  Date of Hospital Admission: 3/9/24  Date of Hospital Discharge: 3/10/24  Readmission Risk Score (high >=14%. Medium >=10%):Readmission Risk Score: 11.4      Care management risk score Rising risk (score 2-5) and Complex Care (Scores >=6): No Risk Score On File     Non face to face  following discharge, date last encounter closed (first attempt may have been earlier): 03/11/2024     Call initiated 2 business days of discharge: Yes     Hospital discharge follow-up  -     OR DISCHARGE MEDS RECONCILED W/ CURRENT OUTPATIENT MED LIST  Heart failure, systolic, acute on chronic (HCC)  Assessment & Plan:   At goal, continue current medications and continue current treatment plan did have some hypokalemia, will continue to monitor potassium level however continue on current dose of Lasix.  Continue to monitor for leg edema, cough, congestion  Paroxysmal atrial fibrillation (HCC)  Assessment & Plan:   At goal, continue current medications and continue current treatment plan and normal sinus rhythm, did have ablation, continue Eliquis at current dose.  Secondary hyperparathyroidism (HCC)  Assessment & Plan:   At goal, continue current medications and continue current treatment plan will continue to monitor electrolytes and calcium.  Secondary hypercoagulable state (HCC)  Stage 3b chronic kidney disease (HCC)  Assessment & Plan:   At goal, continue current medications and continue current treatment plan will continue to monitor kidney function, continue electrolyte replacement with potassium, continue current dose of Lasix.  No NSAIDs and is on Eliquis.  Orders:  -     Basic Metabolic Panel, Fasting; Future    Medical Decision Making: moderate complexity  Return if symptoms worsen or fail to improve.           Subjective:   HPI was admitted to the hospital for shortness of breath, wheezing,

## 2024-03-18 ENCOUNTER — OFFICE VISIT (OUTPATIENT)
Dept: CARDIOLOGY | Age: 84
End: 2024-03-18
Payer: MEDICARE

## 2024-03-18 VITALS
HEART RATE: 61 BPM | WEIGHT: 193.6 LBS | SYSTOLIC BLOOD PRESSURE: 124 MMHG | BODY MASS INDEX: 35.41 KG/M2 | DIASTOLIC BLOOD PRESSURE: 66 MMHG

## 2024-03-18 DIAGNOSIS — M19.90 ARTHRITIS: ICD-10-CM

## 2024-03-18 DIAGNOSIS — I48.0 PAROXYSMAL ATRIAL FIBRILLATION (HCC): ICD-10-CM

## 2024-03-18 DIAGNOSIS — I48.0 PAROXYSMAL A-FIB (HCC): Primary | ICD-10-CM

## 2024-03-18 PROCEDURE — 99214 OFFICE O/P EST MOD 30 MIN: CPT | Performed by: NURSE PRACTITIONER

## 2024-03-18 PROCEDURE — 3078F DIAST BP <80 MM HG: CPT | Performed by: NURSE PRACTITIONER

## 2024-03-18 PROCEDURE — 93010 ELECTROCARDIOGRAM REPORT: CPT | Performed by: NURSE PRACTITIONER

## 2024-03-18 PROCEDURE — G8427 DOCREV CUR MEDS BY ELIG CLIN: HCPCS | Performed by: NURSE PRACTITIONER

## 2024-03-18 PROCEDURE — 99213 OFFICE O/P EST LOW 20 MIN: CPT | Performed by: NURSE PRACTITIONER

## 2024-03-18 PROCEDURE — 1036F TOBACCO NON-USER: CPT | Performed by: NURSE PRACTITIONER

## 2024-03-18 PROCEDURE — G8399 PT W/DXA RESULTS DOCUMENT: HCPCS | Performed by: NURSE PRACTITIONER

## 2024-03-18 PROCEDURE — 3074F SYST BP LT 130 MM HG: CPT | Performed by: NURSE PRACTITIONER

## 2024-03-18 PROCEDURE — 1123F ACP DISCUSS/DSCN MKR DOCD: CPT | Performed by: NURSE PRACTITIONER

## 2024-03-18 PROCEDURE — G8484 FLU IMMUNIZE NO ADMIN: HCPCS | Performed by: NURSE PRACTITIONER

## 2024-03-18 PROCEDURE — 93005 ELECTROCARDIOGRAM TRACING: CPT | Performed by: NURSE PRACTITIONER

## 2024-03-18 PROCEDURE — 1111F DSCHRG MED/CURRENT MED MERGE: CPT | Performed by: NURSE PRACTITIONER

## 2024-03-18 PROCEDURE — G8417 CALC BMI ABV UP PARAM F/U: HCPCS | Performed by: NURSE PRACTITIONER

## 2024-03-18 PROCEDURE — 1090F PRES/ABSN URINE INCON ASSESS: CPT | Performed by: NURSE PRACTITIONER

## 2024-03-18 NOTE — PROGRESS NOTES
Cr1.3) - nephro following, stable chronic, optimize meds  Dose of Eliquis adjusted for age greater than 80 and CKD      Thank you for allowing me to participate in the care of this patient, please do not hesitate to call if you have any questions.    Michela Waldron, FRANNIE - CNP   [facility]  [facility]  [phone]

## 2024-03-20 PROBLEM — I72.4 PSEUDOANEURYSM OF LEFT FEMORAL ARTERY (HCC): Status: RESOLVED | Noted: 2023-02-28 | Resolved: 2024-03-20

## 2024-03-20 ASSESSMENT — ENCOUNTER SYMPTOMS
NAUSEA: 0
COLOR CHANGE: 0
COUGH: 0
ABDOMINAL PAIN: 0
VOMITING: 0

## 2024-03-20 NOTE — ASSESSMENT & PLAN NOTE
At goal, continue current medications and continue current treatment plan will continue to monitor electrolytes and calcium.

## 2024-03-20 NOTE — ASSESSMENT & PLAN NOTE
At goal, continue current medications and continue current treatment plan did have some hypokalemia, will continue to monitor potassium level however continue on current dose of Lasix.  Continue to monitor for leg edema, cough, congestion

## 2024-03-20 NOTE — ASSESSMENT & PLAN NOTE
At goal, continue current medications and continue current treatment plan will continue to monitor kidney function, continue electrolyte replacement with potassium, continue current dose of Lasix.  No NSAIDs and is on Eliquis.

## 2024-03-20 NOTE — ASSESSMENT & PLAN NOTE
At goal, continue current medications and continue current treatment plan and normal sinus rhythm, did have ablation, continue Eliquis at current dose.

## 2024-03-25 ENCOUNTER — HOSPITAL ENCOUNTER (OUTPATIENT)
Age: 84
Discharge: HOME | End: 2024-03-27
Attending: FAMILY MEDICINE
Payer: MEDICARE

## 2024-03-25 VITALS
SYSTOLIC BLOOD PRESSURE: 135 MMHG | DIASTOLIC BLOOD PRESSURE: 61 MMHG | HEART RATE: 62 BPM | BODY MASS INDEX: 35.51 KG/M2 | HEIGHT: 62 IN | WEIGHT: 193 LBS

## 2024-03-25 DIAGNOSIS — I50.23 HEART FAILURE, SYSTOLIC, ACUTE ON CHRONIC (HCC): ICD-10-CM

## 2024-03-25 LAB
ECHO AO ASC DIAM: 3.6 CM
ECHO AO ASCENDING AORTA INDEX: 1.91 CM/M2
ECHO AO ROOT DIAM: 3.1 CM
ECHO AO ROOT INDEX: 1.65 CM/M2
ECHO AR MAX VEL PISA: 4.4 M/S
ECHO AV AREA PEAK VELOCITY: 1.9 CM2
ECHO AV AREA VTI: 2.2 CM2
ECHO AV AREA/BSA PEAK VELOCITY: 1 CM2/M2
ECHO AV AREA/BSA VTI: 1.2 CM2/M2
ECHO AV MEAN GRADIENT: 4 MMHG
ECHO AV MEAN VELOCITY: 1 M/S
ECHO AV PEAK GRADIENT: 7 MMHG
ECHO AV PEAK VELOCITY: 1.3 M/S
ECHO AV REGURGITANT PHT: 630 MS
ECHO AV VELOCITY RATIO: 0.62
ECHO AV VTI: 33.4 CM
ECHO BSA: 1.96 M2
ECHO EST RA PRESSURE: 3 MMHG
ECHO LA AREA 4C: 24 CM2
ECHO LA DIAMETER INDEX: 2.23 CM/M2
ECHO LA DIAMETER: 4.2 CM
ECHO LA MAJOR AXIS: 6.8 CM
ECHO LA TO AORTIC ROOT RATIO: 1.35
ECHO LA VOL MOD A4C: 68 ML (ref 22–52)
ECHO LA VOLUME INDEX MOD A4C: 36 ML/M2 (ref 16–34)
ECHO LV E' LATERAL VELOCITY: 10 CM/S
ECHO LV E' SEPTAL VELOCITY: 7 CM/S
ECHO LV EDV A4C: 69 ML
ECHO LV EDV INDEX A4C: 37 ML/M2
ECHO LV EJECTION FRACTION A4C: 56 %
ECHO LV ESV A4C: 30 ML
ECHO LV ESV INDEX A4C: 16 ML/M2
ECHO LV FRACTIONAL SHORTENING: 39 % (ref 28–44)
ECHO LV INTERNAL DIMENSION DIASTOLE INDEX: 2.87 CM/M2
ECHO LV INTERNAL DIMENSION DIASTOLIC: 5.4 CM (ref 3.9–5.3)
ECHO LV INTERNAL DIMENSION SYSTOLIC INDEX: 1.76 CM/M2
ECHO LV INTERNAL DIMENSION SYSTOLIC: 3.3 CM
ECHO LV ISOVOLUMETRIC RELAXATION TIME (IVRT): 81 MS
ECHO LV IVSD: 1.1 CM (ref 0.6–0.9)
ECHO LV MASS 2D: 249.4 G (ref 67–162)
ECHO LV MASS INDEX 2D: 132.7 G/M2 (ref 43–95)
ECHO LV POSTERIOR WALL DIASTOLIC: 1.2 CM (ref 0.6–0.9)
ECHO LV RELATIVE WALL THICKNESS RATIO: 0.44
ECHO LVOT AREA: 3.1 CM2
ECHO LVOT AV VTI INDEX: 0.69
ECHO LVOT DIAM: 2 CM
ECHO LVOT MEAN GRADIENT: 1 MMHG
ECHO LVOT PEAK GRADIENT: 3 MMHG
ECHO LVOT PEAK VELOCITY: 0.8 M/S
ECHO LVOT STROKE VOLUME INDEX: 38.6 ML/M2
ECHO LVOT SV: 72.5 ML
ECHO LVOT VTI: 23.1 CM
ECHO MV A VELOCITY: 0.29 M/S
ECHO MV E DECELERATION TIME (DT): 208 MS
ECHO MV E VELOCITY: 0.86 M/S
ECHO MV E/A RATIO: 2.97
ECHO MV E/E' LATERAL: 8.6
ECHO MV E/E' RATIO (AVERAGED): 10.44
ECHO MV MAX VELOCITY: 0.9 M/S
ECHO MV PEAK GRADIENT: 3 MMHG
ECHO PV MAX VELOCITY: 0.8 M/S
ECHO PV PEAK GRADIENT: 3 MMHG
ECHO RIGHT VENTRICULAR SYSTOLIC PRESSURE (RVSP): 36 MMHG
ECHO TV PEAK GRADIENT: 2 MMHG
ECHO TV REGURGITANT MAX VELOCITY: 2.86 M/S
ECHO TV REGURGITANT PEAK GRADIENT: 33 MMHG

## 2024-03-25 PROCEDURE — 93306 TTE W/DOPPLER COMPLETE: CPT | Performed by: INTERNAL MEDICINE

## 2024-03-25 PROCEDURE — 93306 TTE W/DOPPLER COMPLETE: CPT

## 2024-04-15 ENCOUNTER — HOSPITAL ENCOUNTER (OUTPATIENT)
Age: 84
Discharge: HOME | End: 2024-04-15
Payer: MEDICARE

## 2024-04-15 DIAGNOSIS — N18.32 STAGE 3B CHRONIC KIDNEY DISEASE (HCC): Primary | ICD-10-CM

## 2024-04-15 DIAGNOSIS — N18.32 STAGE 3B CHRONIC KIDNEY DISEASE (HCC): ICD-10-CM

## 2024-04-15 LAB
ANION GAP SERPL CALCULATED.3IONS-SCNC: 9 MMOL/L (ref 9–17)
BUN SERPL-MCNC: 18 MG/DL (ref 8–23)
BUN/CREAT SERPL: 13 (ref 9–20)
CALCIUM SERPL-MCNC: 9.8 MG/DL (ref 8.6–10.4)
CHLORIDE SERPL-SCNC: 107 MMOL/L (ref 98–107)
CO2 SERPL-SCNC: 28 MMOL/L (ref 20–31)
CREAT SERPL-MCNC: 1.4 MG/DL (ref 0.5–0.9)
GFR SERPL CREATININE-BSD FRML MDRD: 37 ML/MIN/1.73M2
GLUCOSE SERPL-MCNC: 101 MG/DL (ref 70–99)
POTASSIUM SERPL-SCNC: 4.7 MMOL/L (ref 3.7–5.3)
SODIUM SERPL-SCNC: 144 MMOL/L (ref 135–144)

## 2024-04-15 PROCEDURE — 80048 BASIC METABOLIC PNL TOTAL CA: CPT

## 2024-04-15 PROCEDURE — 36415 COLL VENOUS BLD VENIPUNCTURE: CPT

## 2024-04-26 ENCOUNTER — TELEPHONE (OUTPATIENT)
Dept: CARDIOLOGY | Age: 84
End: 2024-04-26

## 2024-04-26 NOTE — TELEPHONE ENCOUNTER
Carolee's note reviewed.  Pt notified of echo results from 3/25.  She denies complaints or symptoms and will follow up as scheduled.

## 2024-04-26 NOTE — TELEPHONE ENCOUNTER
Patient had echo done 3/25 after being admitted to hospital. Ordered by Hospitalist but patient was never notified of results.     Can you advise?    Interpretation Summary         Left Ventricle: Normal left ventricular systolic function with a visually estimated EF of 55 - 60%. Left ventricle size is normal. Increased wall thickness. Normal wall motion. Normal diastolic function.    Aortic Valve: Mild to moderate regurgitation. AV PHT is 630.0 ms.    Left Atrium: Left atrium is dilated.    Image quality is fair.     Echo Findings    Left Ventricle Normal left ventricular systolic function with a visually estimated EF of 55 - 60%. Left ventricle size is normal. Increased wall thickness. Septal motion is normal. Normal wall motion. Normal diastolic function.   Left Atrium Left atrium is dilated.   Right Ventricle Right ventricle size is normal. Normal systolic function.   Right Atrium Right atrium size is normal.   Aortic Valve Valve structure is normal. Mild to moderate regurgitation. AV PHT is 630.0 ms. No stenosis.   Mitral Valve Valve structure is normal. Trace regurgitation. No stenosis noted.   Tricuspid Valve Valve structure is normal. Trace regurgitation. The estimated RVSP is 36 mmHg. No stenosis noted.   Pulmonic Valve Valve structure is normal. No regurgitation. No stenosis noted.   Aorta Normal sized aortic root and ascending aorta.   Pericardium No pericardial effusion.

## 2024-06-13 DIAGNOSIS — Z86.39 HISTORY OF LOW POTASSIUM: ICD-10-CM

## 2024-06-13 RX ORDER — POTASSIUM CHLORIDE 20 MEQ/1
20 TABLET, EXTENDED RELEASE ORAL DAILY
Qty: 90 TABLET | Refills: 1 | Status: SHIPPED | OUTPATIENT
Start: 2024-06-13

## 2024-06-13 NOTE — TELEPHONE ENCOUNTER
Anum Oliveira is requesting a refill on the following medication(s):  Requested Prescriptions     Pending Prescriptions Disp Refills    potassium chloride (KLOR-CON M) 20 MEQ extended release tablet [Pharmacy Med Name: POTASSIUM CL ER 20 MEQ TABLET] 90 tablet 1     Sig: take 1 tablet by mouth once daily       Last Visit Date (If Applicable):  3/14/2024    Next Visit Date:    6/19/2024

## 2024-06-19 ENCOUNTER — OFFICE VISIT (OUTPATIENT)
Dept: FAMILY MEDICINE CLINIC | Age: 84
End: 2024-06-19
Payer: MEDICARE

## 2024-06-19 VITALS
OXYGEN SATURATION: 97 % | BODY MASS INDEX: 35.04 KG/M2 | HEART RATE: 46 BPM | WEIGHT: 190.4 LBS | HEIGHT: 62 IN | SYSTOLIC BLOOD PRESSURE: 140 MMHG | DIASTOLIC BLOOD PRESSURE: 62 MMHG

## 2024-06-19 DIAGNOSIS — I48.0 PAROXYSMAL ATRIAL FIBRILLATION (HCC): ICD-10-CM

## 2024-06-19 DIAGNOSIS — Z13.6 ENCOUNTER FOR LIPID SCREENING FOR CARDIOVASCULAR DISEASE: ICD-10-CM

## 2024-06-19 DIAGNOSIS — Z13.220 ENCOUNTER FOR LIPID SCREENING FOR CARDIOVASCULAR DISEASE: ICD-10-CM

## 2024-06-19 DIAGNOSIS — I50.23 HEART FAILURE, SYSTOLIC, ACUTE ON CHRONIC (HCC): Primary | ICD-10-CM

## 2024-06-19 DIAGNOSIS — R53.83 OTHER FATIGUE: ICD-10-CM

## 2024-06-19 PROBLEM — I72.9 PSEUDOANEURYSM (HCC): Status: RESOLVED | Noted: 2022-02-26 | Resolved: 2024-06-19

## 2024-06-19 PROCEDURE — 99212 OFFICE O/P EST SF 10 MIN: CPT

## 2024-06-19 RX ORDER — METOPROLOL SUCCINATE 25 MG/1
25 TABLET, EXTENDED RELEASE ORAL DAILY
Qty: 90 TABLET | Refills: 0
Start: 2024-06-19 | End: 2024-09-17

## 2024-06-19 SDOH — ECONOMIC STABILITY: FOOD INSECURITY: WITHIN THE PAST 12 MONTHS, THE FOOD YOU BOUGHT JUST DIDN'T LAST AND YOU DIDN'T HAVE MONEY TO GET MORE.: NEVER TRUE

## 2024-06-19 SDOH — ECONOMIC STABILITY: INCOME INSECURITY: HOW HARD IS IT FOR YOU TO PAY FOR THE VERY BASICS LIKE FOOD, HOUSING, MEDICAL CARE, AND HEATING?: NOT HARD AT ALL

## 2024-06-19 SDOH — ECONOMIC STABILITY: FOOD INSECURITY: WITHIN THE PAST 12 MONTHS, YOU WORRIED THAT YOUR FOOD WOULD RUN OUT BEFORE YOU GOT MONEY TO BUY MORE.: NEVER TRUE

## 2024-06-19 ASSESSMENT — ENCOUNTER SYMPTOMS
COUGH: 0
SHORTNESS OF BREATH: 0

## 2024-06-19 NOTE — PROGRESS NOTES
and weakness. Pertinent negatives include no chest pain, congestion, coughing or numbness. The symptoms are aggravated by exertion.       Review of Systems   Constitutional:  Positive for fatigue.   HENT:  Negative for congestion.    Respiratory:  Negative for cough and shortness of breath.    Cardiovascular:  Negative for chest pain.   Neurological:  Positive for dizziness and weakness. Negative for numbness.          Objective   Physical Exam  Vitals and nursing note reviewed.   Constitutional:       General: She is not in acute distress.     Appearance: Normal appearance. She is not ill-appearing.   HENT:      Head: Normocephalic and atraumatic.      Right Ear: Tympanic membrane and external ear normal.      Left Ear: Tympanic membrane and external ear normal.      Nose: Nose normal. No congestion or rhinorrhea.      Mouth/Throat:      Mouth: Mucous membranes are moist.      Pharynx: Oropharynx is clear. No posterior oropharyngeal erythema.   Eyes:      Pupils: Pupils are equal, round, and reactive to light.   Cardiovascular:      Rate and Rhythm: Regular rhythm. Bradycardia present.      Pulses: Normal pulses.      Heart sounds: Normal heart sounds.   Pulmonary:      Effort: Pulmonary effort is normal.      Breath sounds: Normal breath sounds. No wheezing or rhonchi.   Abdominal:      General: Bowel sounds are normal.      Palpations: There is no mass.      Tenderness: There is no abdominal tenderness.   Musculoskeletal:         General: No swelling or tenderness. Normal range of motion.      Cervical back: Normal range of motion. No rigidity or tenderness.   Skin:     General: Skin is warm and dry.      Capillary Refill: Capillary refill takes less than 2 seconds.      Coloration: Skin is not pale.      Findings: No erythema.   Neurological:      General: No focal deficit present.      Mental Status: She is alert and oriented to person, place, and time.   Psychiatric:         Mood and Affect: Mood normal.

## 2024-06-19 NOTE — ASSESSMENT & PLAN NOTE
Does see cardiology, does not have an appointment for several months however heart rate is on lower side which may be exacerbating some of her conditions.  Will cut Toprol in half, have her call cardiology today and get a follow-up appointment within the next month or so.  If unable to then will need to return for recheck in approximately 4 weeks sooner if needed.

## 2024-06-19 NOTE — ASSESSMENT & PLAN NOTE
Monitored by specialist- no acute findings meriting change in the plan encouraged to call cardiology and schedule follow-up visit as their last note dictated states if having worsening shortness of breath, fatigue to give them a call due to aortic regurgitation.

## 2024-06-24 DIAGNOSIS — Z86.39 HISTORY OF LOW POTASSIUM: ICD-10-CM

## 2024-06-24 RX ORDER — POTASSIUM CHLORIDE 20 MEQ/1
20 TABLET, EXTENDED RELEASE ORAL DAILY
Qty: 90 TABLET | Refills: 1 | OUTPATIENT
Start: 2024-06-24

## 2024-07-09 ENCOUNTER — HOSPITAL ENCOUNTER (OUTPATIENT)
Age: 84
Discharge: HOME | End: 2024-07-09
Payer: MEDICARE

## 2024-07-09 DIAGNOSIS — N18.32 STAGE 3B CHRONIC KIDNEY DISEASE (HCC): ICD-10-CM

## 2024-07-09 DIAGNOSIS — N25.81 SECONDARY HYPERPARATHYROIDISM (HCC): ICD-10-CM

## 2024-07-09 DIAGNOSIS — R53.83 OTHER FATIGUE: ICD-10-CM

## 2024-07-09 DIAGNOSIS — Z13.220 ENCOUNTER FOR LIPID SCREENING FOR CARDIOVASCULAR DISEASE: ICD-10-CM

## 2024-07-09 DIAGNOSIS — I10 ESSENTIAL HYPERTENSION: ICD-10-CM

## 2024-07-09 DIAGNOSIS — Z13.6 ENCOUNTER FOR LIPID SCREENING FOR CARDIOVASCULAR DISEASE: ICD-10-CM

## 2024-07-09 LAB
25(OH)D3 SERPL-MCNC: 77.4 NG/ML (ref 30–100)
ALBUMIN SERPL-MCNC: 3.7 G/DL (ref 3.5–5.2)
ANION GAP SERPL CALCULATED.3IONS-SCNC: 10 MMOL/L (ref 9–17)
BASOPHILS # BLD: 0.04 K/UL (ref 0–0.2)
BASOPHILS NFR BLD: 1 % (ref 0–2)
BUN SERPL-MCNC: 17 MG/DL (ref 8–23)
BUN/CREAT SERPL: 11 (ref 9–20)
CALCIUM SERPL-MCNC: 9.6 MG/DL (ref 8.6–10.4)
CHLORIDE SERPL-SCNC: 106 MMOL/L (ref 98–107)
CHOLEST SERPL-MCNC: 187 MG/DL (ref 0–199)
CHOLESTEROL/HDL RATIO: 2
CO2 SERPL-SCNC: 28 MMOL/L (ref 20–31)
CREAT SERPL-MCNC: 1.5 MG/DL (ref 0.5–0.9)
EOSINOPHIL # BLD: 0.11 K/UL (ref 0–0.44)
EOSINOPHILS RELATIVE PERCENT: 2 % (ref 1–4)
ERYTHROCYTE [DISTWIDTH] IN BLOOD BY AUTOMATED COUNT: 13.6 % (ref 11.8–14.4)
GFR, ESTIMATED: 34 ML/MIN/1.73M2
GLUCOSE SERPL-MCNC: 99 MG/DL (ref 70–99)
HCT VFR BLD AUTO: 39.2 % (ref 36.3–47.1)
HDLC SERPL-MCNC: 87 MG/DL
HGB BLD-MCNC: 13.1 G/DL (ref 11.9–15.1)
IMM GRANULOCYTES # BLD AUTO: <0.03 K/UL (ref 0–0.3)
IMM GRANULOCYTES NFR BLD: 0 %
LDLC SERPL CALC-MCNC: 85 MG/DL (ref 0–100)
LYMPHOCYTES NFR BLD: 1.65 K/UL (ref 1.1–3.7)
LYMPHOCYTES RELATIVE PERCENT: 33 % (ref 24–43)
MCH RBC QN AUTO: 32.2 PG (ref 25.2–33.5)
MCHC RBC AUTO-ENTMCNC: 33.4 G/DL (ref 25.2–33.5)
MCV RBC AUTO: 96.3 FL (ref 82.6–102.9)
MONOCYTES NFR BLD: 0.44 K/UL (ref 0.1–1.2)
MONOCYTES NFR BLD: 9 % (ref 3–12)
NEUTROPHILS NFR BLD: 55 % (ref 36–65)
NEUTS SEG NFR BLD: 2.76 K/UL (ref 1.5–8.1)
NRBC BLD-RTO: 0 PER 100 WBC
PHOSPHATE SERPL-MCNC: 3.3 MG/DL (ref 2.6–4.5)
PLATELET # BLD AUTO: 164 K/UL (ref 138–453)
PMV BLD AUTO: 11.7 FL (ref 8.1–13.5)
POTASSIUM SERPL-SCNC: 3.9 MMOL/L (ref 3.7–5.3)
PTH-INTACT SERPL-MCNC: 79 PG/ML (ref 15–65)
RBC # BLD AUTO: 4.07 M/UL (ref 3.95–5.11)
SODIUM SERPL-SCNC: 144 MMOL/L (ref 135–144)
TRIGL SERPL-MCNC: 74 MG/DL (ref 0–149)
TSH SERPL DL<=0.05 MIU/L-ACNC: 2.74 UIU/ML (ref 0.3–5)
URATE SERPL-MCNC: 5.9 MG/DL (ref 2.4–5.7)
VLDLC SERPL CALC-MCNC: 15 MG/DL
WBC OTHER # BLD: 5 K/UL (ref 3.5–11.3)

## 2024-07-09 PROCEDURE — 80048 BASIC METABOLIC PNL TOTAL CA: CPT

## 2024-07-09 PROCEDURE — 84550 ASSAY OF BLOOD/URIC ACID: CPT

## 2024-07-09 PROCEDURE — 82306 VITAMIN D 25 HYDROXY: CPT

## 2024-07-09 PROCEDURE — 84100 ASSAY OF PHOSPHORUS: CPT

## 2024-07-09 PROCEDURE — 84443 ASSAY THYROID STIM HORMONE: CPT

## 2024-07-09 PROCEDURE — 82040 ASSAY OF SERUM ALBUMIN: CPT

## 2024-07-09 PROCEDURE — 80061 LIPID PANEL: CPT

## 2024-07-09 PROCEDURE — 85025 COMPLETE CBC W/AUTO DIFF WBC: CPT

## 2024-07-09 PROCEDURE — 36415 COLL VENOUS BLD VENIPUNCTURE: CPT

## 2024-07-09 PROCEDURE — 83970 ASSAY OF PARATHORMONE: CPT

## 2024-07-16 ENCOUNTER — OFFICE VISIT (OUTPATIENT)
Dept: NEPHROLOGY | Age: 84
End: 2024-07-16
Payer: MEDICARE

## 2024-07-16 VITALS
HEIGHT: 62 IN | BODY MASS INDEX: 34.6 KG/M2 | WEIGHT: 188 LBS | SYSTOLIC BLOOD PRESSURE: 138 MMHG | HEART RATE: 68 BPM | DIASTOLIC BLOOD PRESSURE: 70 MMHG

## 2024-07-16 DIAGNOSIS — D47.2 MONOCLONAL GAMMOPATHY: ICD-10-CM

## 2024-07-16 DIAGNOSIS — I10 ESSENTIAL HYPERTENSION: ICD-10-CM

## 2024-07-16 DIAGNOSIS — N18.32 CHRONIC KIDNEY DISEASE, STAGE 3B (HCC): Primary | ICD-10-CM

## 2024-07-16 DIAGNOSIS — N25.81 SECONDARY HYPERPARATHYROIDISM (HCC): ICD-10-CM

## 2024-07-16 PROCEDURE — 99213 OFFICE O/P EST LOW 20 MIN: CPT

## 2024-07-16 NOTE — PROGRESS NOTES
Renal Progress Note    Patient :  Anum Oliveira; 83 y.o. MRN# [id number]    Reason for Consult:     Asked by Lindsey Calhoun NP to see for KELLEY/Elevated Creatinine.    History of Present Illness:     Anum Oliveira; 83 y.o. female with past medical history as mentioned below.  The patient comes back in 1 year return visit on 7/16/2024.  She has chronic kidney disease stage IIIB, essential hypertension, and secondary hyperparathyroidism.  No chest pain or shortness of breath or nausea or vomiting or swelling of the extremities.  She does have significant weakness.  Earlier this year she was admitted for pneumonia/CHF.    April 2023 results from [facility] showed normal serum protein electrophoresis. The patient has no significant history of protein in the urine and as documented below.   She has a history of atrial fibrillation.  She did have a previous ablation which eventually failed and she is back in atrial fibrillation.  She also has moderate aortic insufficiency.  Her biggest complaint is fatigue.  Her creatinine is relatively stable at 1.5 mg/dl when compared to previous.    Lab data from from 7/9/2024 the patient had an albumin of 3.7 with normal electrolytes, creatinine 1.5 with estimated GFR of 34 ml/minute.  CBC was normal with hemoglobin 13.1, phosphorus normal, intact PTH level mildly elevated at 79 and not requiring treatment, uric acid 5.9, and 25 vitamin D level of 77.    Labs from 7/11/2023 shows uric acid mildly elevated at 6.3 with intact PTH level mildly elevated 76 and not requiring treatment, phosphorus normal at 3.1, CBC normal with hemoglobin 14.1, albumin 3.9.  Basic metabolic panel showed BUN 18 and creatinine 1.5 with estimated GFR of 34 mL/min.  Sodium 145 with potassium 4.3 chloride 108 and CO2 27.  25 vitamin D level normal at 56.6. Random urine protein to creatinine ratio was normal at less than 0.1.  Renal ultrasound shows smallish kidneys but no other significant

## 2024-07-23 RX ORDER — AMIODARONE HYDROCHLORIDE 200 MG/1
200 TABLET ORAL 2 TIMES DAILY
Qty: 180 TABLET | Refills: 2 | Status: SHIPPED | OUTPATIENT
Start: 2024-07-23

## 2024-07-26 NOTE — TELEPHONE ENCOUNTER
I spoke to [facility].  They have the Rx ready at $30 for 90 days.  Pt notified.  She states understanding and agreement.

## 2024-08-22 ENCOUNTER — OFFICE VISIT (OUTPATIENT)
Age: 84
End: 2024-08-22
Payer: MEDICARE

## 2024-08-22 VITALS
WEIGHT: 182 LBS | SYSTOLIC BLOOD PRESSURE: 142 MMHG | DIASTOLIC BLOOD PRESSURE: 58 MMHG | HEART RATE: 43 BPM | BODY MASS INDEX: 33.29 KG/M2 | OXYGEN SATURATION: 100 %

## 2024-08-22 DIAGNOSIS — I48.0 PAROXYSMAL ATRIAL FIBRILLATION (HCC): Primary | ICD-10-CM

## 2024-08-22 PROCEDURE — 1036F TOBACCO NON-USER: CPT | Performed by: INTERNAL MEDICINE

## 2024-08-22 PROCEDURE — 3078F DIAST BP <80 MM HG: CPT | Performed by: INTERNAL MEDICINE

## 2024-08-22 PROCEDURE — G8399 PT W/DXA RESULTS DOCUMENT: HCPCS | Performed by: INTERNAL MEDICINE

## 2024-08-22 PROCEDURE — 3077F SYST BP >= 140 MM HG: CPT | Performed by: INTERNAL MEDICINE

## 2024-08-22 PROCEDURE — 1090F PRES/ABSN URINE INCON ASSESS: CPT | Performed by: INTERNAL MEDICINE

## 2024-08-22 PROCEDURE — 99214 OFFICE O/P EST MOD 30 MIN: CPT | Performed by: INTERNAL MEDICINE

## 2024-08-22 PROCEDURE — 93000 ELECTROCARDIOGRAM COMPLETE: CPT | Performed by: INTERNAL MEDICINE

## 2024-08-22 PROCEDURE — G8417 CALC BMI ABV UP PARAM F/U: HCPCS | Performed by: INTERNAL MEDICINE

## 2024-08-22 PROCEDURE — 1123F ACP DISCUSS/DSCN MKR DOCD: CPT | Performed by: INTERNAL MEDICINE

## 2024-08-22 PROCEDURE — G8427 DOCREV CUR MEDS BY ELIG CLIN: HCPCS | Performed by: INTERNAL MEDICINE

## 2024-08-22 RX ORDER — AMIODARONE HYDROCHLORIDE 200 MG/1
100 TABLET ORAL DAILY
Qty: 180 TABLET | Refills: 2 | Status: SHIPPED | OUTPATIENT
Start: 2024-08-22

## 2024-08-22 NOTE — PROGRESS NOTES
Cardiology Consultation  [facility]      08/22/24    CC : patient is here for follow up.       Anum Oliveira  is stable from cardiac standpoint. She is sinus bradycardia today. Resting HR in 40's. Denies any dizziness or syncope. No chest pain.     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     BREAST SURGERY Right 1994    biopsy, benign    CARDIAC CATHETERIZATION  11/28/2000    CARDIAC CATHETERIZATION  03/19/2021    CARDIOVERSION  06/11/2021    COLECTOMY  10/2006    COLONOSCOPY  2006, 2009    x2    COLONOSCOPY  2016    Dr. Rogers  Polyp.  Repeat 2019    COLONOSCOPY  03/2019    Cecal Polyp  Done at [facility] STEROTACTIC LOC BREAST BIOPSY RIGHT Right 7/12/2021    [facility] STEROTACTIC LOC BREAST BIOPSY RIGHT 7/12/2021 Terrence Mir Jr., DO MD MAMMOGRAPHY    NC BIOPSY BREAST OPEN INCISIONAL Right 1994    benign    NC PUNCTURE ASPIRATION CYST BREAST Right     unsure date    TRANSESOPHAGEAL ECHOCARDIOGRAM  06/11/2021    UTERINE FIBROID EMBOLIZATION  10/2006       Family History   Problem Relation Age of Onset    High Blood Pressure Mother     Emphysema Father     Osteoporosis Sister     Heart Disease Sister   tablet by mouth once daily 180 tablet 3    apixaban (ELIQUIS) 2.5 MG TABS tablet Take 1 tablet by mouth 2 times daily 180 tablet 3    simvastatin (ZOCOR) 20 MG tablet take 1 tablet by mouth at bedtime 90 tablet 3    amiodarone (CORDARONE) 200 MG tablet Take 1 tablet by mouth daily 180 tablet 2    BIOTIN PO Take 2,500 mg by mouth in the morning and at bedtime      anastrozole (ARIMIDEX) 1 MG tablet Take 1 tablet by mouth daily      calcium carbonate (OSCAL) 500 MG TABS tablet Take 1 tablet by mouth 2 times daily      acetaminophen (TYLENOL) 325 MG tablet Take 2 tablets by mouth every 6 hours as needed for Pain      Cholecalciferol (VITAMIN D3) 1000 units CAPS Take by mouth in the morning and at bedtime      Cyanocobalamin (B-12) 1000 MCG CAPS Take 1 tablet by mouth daily Indications: High Binding Capacity of Iron to Red Blood Cells       polyethylene glycol (GLYCOLAX) powder Take 17 g by mouth daily       No current facility-administered medications for this visit.        Patient Active Problem List   Diagnosis    Atrial fibrillation (HCC)    Essential hypertension    Chest pain    Hyperlipidemia    Arthritis    Colon polyps    Constipation    Kidney disorder    Cervical spine degeneration    AVM (arteriovenous malformation) of colon    Non morbid obesity due to excess calories    Small bowel obstruction (HCC)    Shortness of breath    Chronic kidney disease, stage III (moderate) (HCC)    Osteopenia    Stage 3b chronic kidney disease (HCC)    Calcium pyrophosphate deposition disease (CPPD)    Diverticulosis    Ductal carcinoma in situ (DCIS) of right breast    PMB (postmenopausal bleeding)    Primary osteoarthritis of left knee    Primary osteoarthritis of right knee    Thrombocytopenia (HCC)    Secondary hypercoagulable state (HCC)    Heart failure, systolic, acute on chronic (HCC)    Pneumonia, unspecified organism    Secondary hyperparathyroidism (HCC)           REVIEW OF SYSTEMS:    Constitutional: there has been

## 2024-09-03 ENCOUNTER — OFFICE VISIT (OUTPATIENT)
Dept: FAMILY MEDICINE CLINIC | Age: 84
End: 2024-09-03
Payer: MEDICARE

## 2024-09-03 VITALS
OXYGEN SATURATION: 98 % | DIASTOLIC BLOOD PRESSURE: 78 MMHG | HEIGHT: 62 IN | HEART RATE: 44 BPM | WEIGHT: 184 LBS | BODY MASS INDEX: 33.86 KG/M2 | SYSTOLIC BLOOD PRESSURE: 118 MMHG

## 2024-09-03 DIAGNOSIS — J02.9 SORE THROAT: ICD-10-CM

## 2024-09-03 DIAGNOSIS — F41.9 ANXIETY: ICD-10-CM

## 2024-09-03 DIAGNOSIS — Z01.89 ENCOUNTER FOR IMAGING TO ASSESS THYROID ENLARGEMENT: Primary | ICD-10-CM

## 2024-09-03 PROCEDURE — G8427 DOCREV CUR MEDS BY ELIG CLIN: HCPCS

## 2024-09-03 PROCEDURE — G8399 PT W/DXA RESULTS DOCUMENT: HCPCS

## 2024-09-03 PROCEDURE — G8417 CALC BMI ABV UP PARAM F/U: HCPCS

## 2024-09-03 PROCEDURE — 99214 OFFICE O/P EST MOD 30 MIN: CPT

## 2024-09-03 PROCEDURE — 1036F TOBACCO NON-USER: CPT

## 2024-09-03 PROCEDURE — 3078F DIAST BP <80 MM HG: CPT

## 2024-09-03 PROCEDURE — 3074F SYST BP LT 130 MM HG: CPT

## 2024-09-03 PROCEDURE — 99211 OFF/OP EST MAY X REQ PHY/QHP: CPT

## 2024-09-03 PROCEDURE — 1123F ACP DISCUSS/DSCN MKR DOCD: CPT

## 2024-09-03 PROCEDURE — 1090F PRES/ABSN URINE INCON ASSESS: CPT

## 2024-09-03 RX ORDER — PREDNISONE 20 MG/1
20 TABLET ORAL DAILY
Qty: 5 TABLET | Refills: 0 | Status: SHIPPED | OUTPATIENT
Start: 2024-09-03 | End: 2024-09-08

## 2024-09-03 RX ORDER — SERTRALINE HYDROCHLORIDE 25 MG/1
25 TABLET, FILM COATED ORAL DAILY
Qty: 30 TABLET | Refills: 1 | Status: SHIPPED | OUTPATIENT
Start: 2024-09-03

## 2024-09-06 ENCOUNTER — HOSPITAL ENCOUNTER (OUTPATIENT)
Dept: ULTRASOUND IMAGING | Age: 84
Discharge: HOME | End: 2024-09-08
Payer: MEDICARE

## 2024-09-06 DIAGNOSIS — Z01.89 ENCOUNTER FOR IMAGING TO ASSESS THYROID ENLARGEMENT: ICD-10-CM

## 2024-09-06 PROCEDURE — 76536 US EXAM OF HEAD AND NECK: CPT

## 2024-09-09 ASSESSMENT — ENCOUNTER SYMPTOMS
VOICE CHANGE: 0
SHORTNESS OF BREATH: 0
TROUBLE SWALLOWING: 1
COLOR CHANGE: 0
COUGH: 0
SORE THROAT: 1
WHEEZING: 0

## 2024-10-29 ENCOUNTER — OFFICE VISIT (OUTPATIENT)
Dept: FAMILY MEDICINE CLINIC | Age: 84
End: 2024-10-29
Payer: MEDICARE

## 2024-10-29 VITALS
HEART RATE: 84 BPM | RESPIRATION RATE: 14 BRPM | OXYGEN SATURATION: 97 % | WEIGHT: 183.2 LBS | HEIGHT: 62 IN | DIASTOLIC BLOOD PRESSURE: 60 MMHG | BODY MASS INDEX: 33.71 KG/M2 | SYSTOLIC BLOOD PRESSURE: 134 MMHG

## 2024-10-29 DIAGNOSIS — F41.9 ANXIETY: ICD-10-CM

## 2024-10-29 DIAGNOSIS — Z23 INFLUENZA VACCINE NEEDED: ICD-10-CM

## 2024-10-29 DIAGNOSIS — E04.2 MULTIPLE THYROID NODULES: Primary | ICD-10-CM

## 2024-10-29 PROCEDURE — 90653 IIV ADJUVANT VACCINE IM: CPT

## 2024-10-29 SDOH — ECONOMIC STABILITY: FOOD INSECURITY: WITHIN THE PAST 12 MONTHS, YOU WORRIED THAT YOUR FOOD WOULD RUN OUT BEFORE YOU GOT MONEY TO BUY MORE.: PATIENT DECLINED

## 2024-10-29 SDOH — ECONOMIC STABILITY: FOOD INSECURITY: WITHIN THE PAST 12 MONTHS, THE FOOD YOU BOUGHT JUST DIDN'T LAST AND YOU DIDN'T HAVE MONEY TO GET MORE.: PATIENT DECLINED

## 2024-10-29 SDOH — ECONOMIC STABILITY: INCOME INSECURITY: HOW HARD IS IT FOR YOU TO PAY FOR THE VERY BASICS LIKE FOOD, HOUSING, MEDICAL CARE, AND HEATING?: PATIENT DECLINED

## 2024-10-29 ASSESSMENT — PATIENT HEALTH QUESTIONNAIRE - PHQ9: DEPRESSION UNABLE TO ASSESS: PT REFUSES

## 2024-10-29 NOTE — PROGRESS NOTES
Anum Oliveira (:  1940) is a 84 y.o. female,Established patient, here for evaluation of the following chief complaint(s):  Thyroid Problem (8 week follow up for Thyroid enlargement.  Still has sore throat when she swallows anything.  States she can still feel the thyroid on her right side.)         Assessment & Plan  Multiple thyroid nodules    Will send to endocrinology for further workup and treatment options as she is still having some symptoms.    Orders:    Peter Higgins MD, Endocrinology, [place]    Anxiety   Chronic, not at goal (unstable), changes made today: Increase Zoloft to 50 mg tablets 1 tab p.o. daily.  Side effects this medication discussed.  Not suicidal homicidal at this time.    Orders:    sertraline (ZOLOFT) 50 MG tablet; Take 1 tablet by mouth daily    Influenza vaccine needed    No contraindications to immunizations.  Vaccine information given as well as a conization administered    Orders:    Influenza, FLUAD Trivalent, (age 65 y+), IM, Preservative Free, 0.5mL      Return in about 6 months (around 2025), or if symptoms worsen or fail to improve.       Subjective   Thyroid Problem  Presents for follow-up visit. Symptoms include anxiety and tremors. Patient reports no diarrhea, fatigue, heat intolerance, weight gain or weight loss.   Anxiety  Presents for follow-up visit. Symptoms include excessive worry and nervous/anxious behavior. Patient reports no chest pain, nausea, panic, shortness of breath or suicidal ideas. Symptoms occur most days. The severity of symptoms is moderate. The quality of sleep is fair. Nighttime awakenings: occasional.           Review of Systems   Constitutional:  Negative for fatigue, fever, unexpected weight change, weight gain and weight loss.   HENT:  Positive for sore throat and trouble swallowing (Keeping fluids down, handling secretions without any difficulty). Negative for congestion and voice change.    Respiratory:  Negative for

## 2024-10-29 NOTE — PROGRESS NOTES
After obtaining consent, and per orders of Dr. Wesly Vasquez In, injection of Influenza High dose given in Left deltoid by Melany Nelson LPN. Patient instructed to remain in clinic for 20 minutes afterwards, and to report any adverse reaction to me immediately.

## 2024-11-04 ASSESSMENT — ENCOUNTER SYMPTOMS
NAUSEA: 0
ABDOMINAL PAIN: 0
SHORTNESS OF BREATH: 0
WHEEZING: 0
COLOR CHANGE: 0
VOMITING: 0
DIARRHEA: 0
SORE THROAT: 1
COUGH: 0
TROUBLE SWALLOWING: 1
VOICE CHANGE: 0

## 2024-11-07 ENCOUNTER — OFFICE VISIT (OUTPATIENT)
Dept: FAMILY MEDICINE CLINIC | Age: 84
End: 2024-11-07
Payer: MEDICARE

## 2024-11-07 VITALS
WEIGHT: 182 LBS | BODY MASS INDEX: 33.29 KG/M2 | SYSTOLIC BLOOD PRESSURE: 128 MMHG | DIASTOLIC BLOOD PRESSURE: 66 MMHG | HEART RATE: 47 BPM | OXYGEN SATURATION: 97 %

## 2024-11-07 DIAGNOSIS — I48.0 PAROXYSMAL ATRIAL FIBRILLATION (HCC): ICD-10-CM

## 2024-11-07 DIAGNOSIS — I50.23 HEART FAILURE, SYSTOLIC, ACUTE ON CHRONIC (HCC): ICD-10-CM

## 2024-11-07 PROCEDURE — 99212 OFFICE O/P EST SF 10 MIN: CPT

## 2024-11-07 RX ORDER — METOPROLOL SUCCINATE 25 MG/1
12.5 TABLET, EXTENDED RELEASE ORAL DAILY
Qty: 90 TABLET | Refills: 0
Start: 2024-11-07 | End: 2025-05-06

## 2024-11-07 RX ORDER — AMIODARONE HYDROCHLORIDE 100 MG/1
100 TABLET ORAL DAILY
Qty: 90 TABLET | Refills: 1
Start: 2024-11-07

## 2024-11-07 NOTE — PROGRESS NOTES
[facility]  [address] [address]  Dept: [phone]    Date of Service:  11/7/2024    Anum Oliveira is a 84 y.o. female who presents in office today with Self.    Chief Complaint   Patient presents with    Follow-up     Patient is here today to follow up for her low blood pressure and pulse that was taken at her oncology appointment.         Diagnoses / Plan:   1. Heart failure, systolic, acute on chronic (HCC)  -     metoprolol succinate (TOPROL XL) 25 MG extended release tablet; Take 0.5 tablets by mouth daily, Disp-90 tablet, R-0Adjust Sig  2. Paroxysmal atrial fibrillation (HCC)  -     metoprolol succinate (TOPROL XL) 25 MG extended release tablet; Take 0.5 tablets by mouth daily, Disp-90 tablet, R-0Adjust Sig  Monitored by specialist no change in plan of care.  They did decrease her Toprol to half a tab due to bradycardia and cardiology's office.  She was not having symptoms at that time and that was several months prior.  Will have her call to inform them she is still having bradycardia despite taking half tab    Patient verbalizes understanding regarding plan of care and all questions answered.    No follow-ups on file.     Subjective:   History of Present Illness:  [place] is here today for bradycardia.  She was at her oncologist office and noticed that her heart rate was in the 40s and 50s.  She does follow with cardiology and had an appointment in September where her heart rate was the same.  He did decrease her Toprol down to half a tab instead of 1 full tab.  She was not having symptoms at that time no dizziness, no orthostatic hypotension like symptoms with dizziness or nausea with standing or standing, nor she having it currently.  She was just concerned.      Current Outpatient Medications   Medication Sig Dispense Refill    amiodarone (PACERONE) 100 MG tablet Take 1 tablet by mouth daily 90 tablet 1    metoprolol succinate (TOPROL XL) 25 MG extended

## 2024-11-13 ASSESSMENT — ENCOUNTER SYMPTOMS
COLOR CHANGE: 0
SHORTNESS OF BREATH: 0
ABDOMINAL PAIN: 0
COUGH: 0

## 2024-12-06 NOTE — TELEPHONE ENCOUNTER
Anum Oliveira is requesting a refill on the following medication(s):  Requested Prescriptions     Pending Prescriptions Disp Refills    simvastatin (ZOCOR) 20 MG tablet 90 tablet 3     Sig: take 1 tablet by mouth at bedtime       Last Visit Date (If Applicable):  11/7/2024    Next Visit Date:    1/7/2025

## 2024-12-09 RX ORDER — SIMVASTATIN 20 MG
TABLET ORAL
Qty: 90 TABLET | Refills: 3 | Status: SHIPPED | OUTPATIENT
Start: 2024-12-09

## 2024-12-19 DIAGNOSIS — Z86.39 HISTORY OF LOW POTASSIUM: ICD-10-CM

## 2024-12-19 DIAGNOSIS — I48.0 PAROXYSMAL ATRIAL FIBRILLATION (HCC): ICD-10-CM

## 2024-12-19 RX ORDER — POTASSIUM CHLORIDE 1500 MG/1
20 TABLET, EXTENDED RELEASE ORAL DAILY
Qty: 90 TABLET | Refills: 1 | Status: SHIPPED | OUTPATIENT
Start: 2024-12-19

## 2024-12-19 NOTE — TELEPHONE ENCOUNTER
Anum Oliveira is requesting a refill on the following medication(s):  Requested Prescriptions     Pending Prescriptions Disp Refills    apixaban (ELIQUIS) 2.5 MG TABS tablet 180 tablet 3     Sig: Take 1 tablet by mouth 2 times daily    potassium chloride (KLOR-CON M) 20 MEQ extended release tablet 90 tablet 1     Sig: Take 1 tablet by mouth daily       Last Visit Date (If Applicable):  11/7/2024    Next Visit Date:    1/7/2025      Anum Oliveira is requesting a refill on the following medication(s):  Requested Prescriptions     Pending Prescriptions Disp Refills    apixaban (ELIQUIS) 2.5 MG TABS tablet 180 tablet 3     Sig: Take 1 tablet by mouth 2 times daily    potassium chloride (KLOR-CON M) 20 MEQ extended release tablet 90 tablet 1     Sig: Take 1 tablet by mouth daily       Last Visit Date (If Applicable):  11/7/2024    Next Visit Date:    1/7/2025

## 2025-01-07 ENCOUNTER — OFFICE VISIT (OUTPATIENT)
Dept: FAMILY MEDICINE CLINIC | Age: 85
End: 2025-01-07
Payer: MEDICARE

## 2025-01-07 VITALS
WEIGHT: 176 LBS | SYSTOLIC BLOOD PRESSURE: 120 MMHG | BODY MASS INDEX: 32.19 KG/M2 | HEART RATE: 48 BPM | DIASTOLIC BLOOD PRESSURE: 60 MMHG | OXYGEN SATURATION: 97 %

## 2025-01-07 DIAGNOSIS — Z00.00 MEDICARE ANNUAL WELLNESS VISIT, SUBSEQUENT: Primary | ICD-10-CM

## 2025-01-07 DIAGNOSIS — I50.23 HEART FAILURE, SYSTOLIC, ACUTE ON CHRONIC (HCC): ICD-10-CM

## 2025-01-07 DIAGNOSIS — I48.0 PAROXYSMAL ATRIAL FIBRILLATION (HCC): ICD-10-CM

## 2025-01-07 DIAGNOSIS — Z13.220 ENCOUNTER FOR LIPID SCREENING FOR CARDIOVASCULAR DISEASE: ICD-10-CM

## 2025-01-07 DIAGNOSIS — N18.32 CHRONIC KIDNEY DISEASE, STAGE 3B (HCC): ICD-10-CM

## 2025-01-07 DIAGNOSIS — Z13.6 ENCOUNTER FOR LIPID SCREENING FOR CARDIOVASCULAR DISEASE: ICD-10-CM

## 2025-01-07 PROCEDURE — G0439 PPPS, SUBSEQ VISIT: HCPCS

## 2025-01-07 PROCEDURE — 3078F DIAST BP <80 MM HG: CPT

## 2025-01-07 PROCEDURE — 99397 PER PM REEVAL EST PAT 65+ YR: CPT

## 2025-01-07 PROCEDURE — M1299 PR FLU IMMUNIZE ORDER/ADMIN: HCPCS

## 2025-01-07 PROCEDURE — 1160F RVW MEDS BY RX/DR IN RCRD: CPT

## 2025-01-07 PROCEDURE — 1123F ACP DISCUSS/DSCN MKR DOCD: CPT

## 2025-01-07 PROCEDURE — 1159F MED LIST DOCD IN RCRD: CPT

## 2025-01-07 PROCEDURE — 3074F SYST BP LT 130 MM HG: CPT

## 2025-01-07 RX ORDER — CLOTRIMAZOLE 1 %
CREAM (GRAM) TOPICAL
COMMUNITY
Start: 2024-12-13

## 2025-01-07 RX ORDER — FLUTICASONE PROPIONATE 50 MCG
2 SPRAY, SUSPENSION (ML) NASAL DAILY
Qty: 16 G | Refills: 0 | Status: SHIPPED | OUTPATIENT
Start: 2025-01-07

## 2025-01-07 ASSESSMENT — LIFESTYLE VARIABLES
HOW MANY STANDARD DRINKS CONTAINING ALCOHOL DO YOU HAVE ON A TYPICAL DAY: PATIENT DOES NOT DRINK
HOW OFTEN DO YOU HAVE A DRINK CONTAINING ALCOHOL: NEVER

## 2025-01-07 ASSESSMENT — PATIENT HEALTH QUESTIONNAIRE - PHQ9
1. LITTLE INTEREST OR PLEASURE IN DOING THINGS: NOT AT ALL
SUM OF ALL RESPONSES TO PHQ QUESTIONS 1-9: 0
2. FEELING DOWN, DEPRESSED OR HOPELESS: NOT AT ALL
SUM OF ALL RESPONSES TO PHQ QUESTIONS 1-9: 0
SUM OF ALL RESPONSES TO PHQ9 QUESTIONS 1 & 2: 0
SUM OF ALL RESPONSES TO PHQ QUESTIONS 1-9: 0
SUM OF ALL RESPONSES TO PHQ QUESTIONS 1-9: 0

## 2025-01-07 NOTE — PROGRESS NOTES
Medicare Annual Wellness Visit    Anum Oliveira is here for Medicare AWV (Patient is here today for medicare wellness. )    Assessment & Plan   Medicare annual wellness visit, subsequent  Heart failure, systolic, acute on chronic (HCC)  Paroxysmal atrial fibrillation (HCC)  Chronic kidney disease, stage 3b (HCC)  Encounter for lipid screening for cardiovascular disease  -     Lipid, Fasting; Future     Return in about 6 months (around 7/7/2025), or if symptoms worsen or fail to improve.     Subjective       Patient's complete Health Risk Assessment and screening values have been reviewed and are found in Flowsheets. The following problems were reviewed today and where indicated follow up appointments were made and/or referrals ordered.    Positive Risk Factor Screenings with Interventions:    Fall Risk:  Do you feel unsteady or are you worried about falling? : (!) yes  2 or more falls in past year?: no  Fall with injury in past year?: no     Interventions:    Reviewed medications, home hazards, visual acuity, and co-morbidities that can increase risk for falls               Abnormal BMI (obese):  Body mass index is 32.19 kg/m². (!) Abnormal  Interventions:  Patient advised to follow-up in this office for further evaluation and treatment          Vision Screen:  Do you have difficulty driving, watching TV, or doing any of your daily activities because of your eyesight?: (!) Yes  Have you had an eye exam within the past year?: (!) No  Interventions:   Patient encouraged to make appointment with their eye specialist                    Objective   Vitals:    01/07/25 1310   BP: 120/60   Site: Right Upper Arm   Position: Sitting   Pulse: (!) 48   SpO2: 97%   Weight: 79.8 kg (176 lb)      Body mass index is 32.19 kg/m².        General Appearance: alert and oriented to person, place and time, well developed and well- nourished, in no acute distress  Skin: warm and dry, no rash or erythema  Head: normocephalic and

## 2025-01-22 DIAGNOSIS — F41.9 ANXIETY: ICD-10-CM

## 2025-01-22 NOTE — TELEPHONE ENCOUNTER
Anum Oliveira is requesting a refill on the following medication(s):  Requested Prescriptions     Pending Prescriptions Disp Refills    sertraline (ZOLOFT) 50 MG tablet [Pharmacy Med Name: Sertraline HCl 50 MG Oral Tablet] 90 tablet 0     Sig: Take 1 tablet by mouth once daily       Last Visit Date (If Applicable):  1/7/2025    Next Visit Date:    7/8/2025

## 2025-01-27 DIAGNOSIS — I50.23 HEART FAILURE, SYSTOLIC, ACUTE ON CHRONIC (HCC): ICD-10-CM

## 2025-01-27 DIAGNOSIS — I48.0 PAROXYSMAL ATRIAL FIBRILLATION (HCC): ICD-10-CM

## 2025-01-27 RX ORDER — METOPROLOL SUCCINATE 25 MG/1
12.5 TABLET, EXTENDED RELEASE ORAL DAILY
Qty: 90 TABLET | Refills: 0 | Status: SHIPPED | OUTPATIENT
Start: 2025-01-27 | End: 2025-07-26

## 2025-01-27 NOTE — TELEPHONE ENCOUNTER
Anum Oliveira is requesting a refill on the following medication(s):  Requested Prescriptions     Pending Prescriptions Disp Refills    metoprolol succinate (TOPROL XL) 25 MG extended release tablet 90 tablet 0     Sig: Take 0.5 tablets by mouth daily       Last Visit Date (If Applicable):  1/7/2025    Next Visit Date:    7/8/2025

## 2025-02-11 RX ORDER — FUROSEMIDE 20 MG/1
20 TABLET ORAL DAILY
Qty: 180 TABLET | Refills: 3 | Status: SHIPPED | OUTPATIENT
Start: 2025-02-11

## 2025-02-11 NOTE — TELEPHONE ENCOUNTER
Anum Oliveira is requesting a refill on the following medication(s):  Requested Prescriptions     Pending Prescriptions Disp Refills    furosemide (LASIX) 20 MG tablet 180 tablet 3     Sig: Take 1 tablet by mouth daily       Last Visit Date (If Applicable):  1/7/2025    Next Visit Date:    7/8/2025      Anum Oliveira is requesting a refill on the following medication(s):  Requested Prescriptions     Pending Prescriptions Disp Refills    furosemide (LASIX) 20 MG tablet 180 tablet 3     Sig: Take 1 tablet by mouth daily       Last Visit Date (If Applicable):  1/7/2025    Next Visit Date:    7/8/2025

## 2025-03-05 RX ORDER — AMIODARONE HYDROCHLORIDE 100 MG/1
100 TABLET ORAL DAILY
Qty: 90 TABLET | Refills: 1 | Status: SHIPPED | OUTPATIENT
Start: 2025-03-05 | End: 2025-03-06

## 2025-03-05 NOTE — TELEPHONE ENCOUNTER
Please advise if patient is still supposed to be taking or not patient states pharmacy said diane discontinued the amiodarone and wants to know if so what's in its place

## 2025-03-06 RX ORDER — AMIODARONE HYDROCHLORIDE 100 MG/1
100 TABLET ORAL DAILY
Qty: 90 TABLET | Refills: 1 | Status: SHIPPED | OUTPATIENT
Start: 2025-03-06

## 2025-04-22 DIAGNOSIS — F41.9 ANXIETY: ICD-10-CM

## 2025-06-12 DIAGNOSIS — Z86.39 HISTORY OF LOW POTASSIUM: ICD-10-CM

## 2025-06-12 RX ORDER — POTASSIUM CHLORIDE 1500 MG/1
20 TABLET, EXTENDED RELEASE ORAL DAILY
Qty: 90 TABLET | Refills: 0 | Status: SHIPPED | OUTPATIENT
Start: 2025-06-12

## 2025-06-12 NOTE — TELEPHONE ENCOUNTER
Anum Oliveira is requesting a refill on the following medication(s):  Requested Prescriptions     Pending Prescriptions Disp Refills    KLOR-CON M20 20 MEQ extended release tablet [Pharmacy Med Name: Klor-Con M20 20 MEQ Oral Tablet Extended Release] 90 tablet 0     Sig: Take 1 tablet by mouth once daily       Last Visit Date (If Applicable):  1/7/2025    Next Visit Date:    7/15/2025

## 2025-06-30 ENCOUNTER — HOSPITAL ENCOUNTER (OUTPATIENT)
Age: 85
Discharge: HOME | End: 2025-06-30
Payer: MEDICARE

## 2025-06-30 DIAGNOSIS — D47.2 MONOCLONAL GAMMOPATHY: ICD-10-CM

## 2025-06-30 DIAGNOSIS — Z13.220 ENCOUNTER FOR LIPID SCREENING FOR CARDIOVASCULAR DISEASE: ICD-10-CM

## 2025-06-30 DIAGNOSIS — N18.32 CHRONIC KIDNEY DISEASE, STAGE 3B (HCC): ICD-10-CM

## 2025-06-30 DIAGNOSIS — I10 ESSENTIAL HYPERTENSION: ICD-10-CM

## 2025-06-30 DIAGNOSIS — N25.81 SECONDARY HYPERPARATHYROIDISM: ICD-10-CM

## 2025-06-30 DIAGNOSIS — Z13.6 ENCOUNTER FOR LIPID SCREENING FOR CARDIOVASCULAR DISEASE: ICD-10-CM

## 2025-06-30 LAB
25(OH)D3 SERPL-MCNC: 103 NG/ML (ref 30–100)
ALBUMIN SERPL-MCNC: 4.2 G/DL (ref 3.5–5.2)
ANION GAP SERPL CALCULATED.3IONS-SCNC: 11 MMOL/L (ref 9–16)
BACTERIA URNS QL MICRO: ABNORMAL
BASOPHILS # BLD: 0.06 K/UL (ref 0–0.2)
BASOPHILS NFR BLD: 1 % (ref 0–2)
BILIRUB UR QL STRIP: NEGATIVE
BUN SERPL-MCNC: 17 MG/DL (ref 8–23)
BUN/CREAT SERPL: 11 (ref 9–20)
CALCIUM SERPL-MCNC: 10.1 MG/DL (ref 8.6–10.4)
CHARACTER UR: ABNORMAL
CHLORIDE SERPL-SCNC: 107 MMOL/L (ref 98–107)
CHOLEST SERPL-MCNC: 184 MG/DL (ref 0–199)
CHOLESTEROL/HDL RATIO: 2.1
CLARITY UR: CLEAR
CO2 SERPL-SCNC: 27 MMOL/L (ref 20–31)
COLOR UR: YELLOW
CREAT SERPL-MCNC: 1.5 MG/DL (ref 0.6–0.9)
CREAT UR-MCNC: 58 MG/DL (ref 28–217)
EOSINOPHIL # BLD: 0.11 K/UL (ref 0–0.44)
EOSINOPHILS RELATIVE PERCENT: 2 % (ref 1–4)
EPI CELLS #/AREA URNS HPF: ABNORMAL /HPF (ref 0–5)
ERYTHROCYTE [DISTWIDTH] IN BLOOD BY AUTOMATED COUNT: 13.3 % (ref 11.8–14.4)
GFR, ESTIMATED: 34 ML/MIN/1.73M2
GLUCOSE SERPL-MCNC: 97 MG/DL (ref 74–99)
GLUCOSE UR STRIP-MCNC: NEGATIVE MG/DL
HCT VFR BLD AUTO: 41.7 % (ref 36.3–47.1)
HDLC SERPL-MCNC: 88 MG/DL
HGB BLD-MCNC: 13.9 G/DL (ref 11.9–15.1)
HGB UR QL STRIP.AUTO: NEGATIVE
IMM GRANULOCYTES # BLD AUTO: <0.03 K/UL (ref 0–0.3)
IMM GRANULOCYTES NFR BLD: 0 %
KETONES UR STRIP-MCNC: NEGATIVE MG/DL
LDLC SERPL CALC-MCNC: 82 MG/DL (ref 0–100)
LEUKOCYTE ESTERASE UR QL STRIP: ABNORMAL
LYMPHOCYTES NFR BLD: 1.85 K/UL (ref 1.1–3.7)
LYMPHOCYTES RELATIVE PERCENT: 33 % (ref 24–43)
MCH RBC QN AUTO: 31.4 PG (ref 25.2–33.5)
MCHC RBC AUTO-ENTMCNC: 33.3 G/DL (ref 25.2–33.5)
MCV RBC AUTO: 94.3 FL (ref 82.6–102.9)
MONOCYTES NFR BLD: 0.46 K/UL (ref 0.1–1.2)
MONOCYTES NFR BLD: 8 % (ref 3–12)
NEUTROPHILS NFR BLD: 56 % (ref 36–65)
NEUTS SEG NFR BLD: 3.11 K/UL (ref 1.5–8.1)
NITRITE UR QL STRIP: NEGATIVE
NRBC BLD-RTO: 0 PER 100 WBC
PH UR STRIP: 6 [PH] (ref 5–6)
PHOSPHATE SERPL-MCNC: 3.3 MG/DL (ref 2.5–4.5)
PLATELET # BLD AUTO: 165 K/UL (ref 138–453)
PMV BLD AUTO: 12.1 FL (ref 8.1–13.5)
POTASSIUM SERPL-SCNC: 4.4 MMOL/L (ref 3.7–5.3)
PROT UR STRIP-MCNC: NEGATIVE MG/DL
PTH-INTACT SERPL-MCNC: 79 PG/ML (ref 17.9–58.6)
RBC # BLD AUTO: 4.42 M/UL (ref 3.95–5.11)
RBC #/AREA URNS HPF: ABNORMAL /HPF (ref 0–4)
SODIUM SERPL-SCNC: 145 MMOL/L (ref 136–145)
SP GR UR STRIP: 1.01 (ref 1.01–1.02)
TOTAL PROTEIN, URINE: 13 MG/DL
TRIGL SERPL-MCNC: 72 MG/DL (ref 0–149)
URATE SERPL-MCNC: 5.5 MG/DL (ref 2.4–5.7)
UROBILINOGEN UR STRIP-ACNC: NORMAL EU/DL (ref 0–1)
VLDLC SERPL CALC-MCNC: 14 MG/DL (ref 1–30)
WBC #/AREA URNS HPF: ABNORMAL /HPF (ref 0–4)
WBC OTHER # BLD: 5.6 K/UL (ref 3.5–11.3)

## 2025-06-30 PROCEDURE — 81001 URINALYSIS AUTO W/SCOPE: CPT

## 2025-06-30 PROCEDURE — 36415 COLL VENOUS BLD VENIPUNCTURE: CPT

## 2025-06-30 PROCEDURE — 84550 ASSAY OF BLOOD/URIC ACID: CPT

## 2025-06-30 PROCEDURE — 84100 ASSAY OF PHOSPHORUS: CPT

## 2025-06-30 PROCEDURE — 84156 ASSAY OF PROTEIN URINE: CPT

## 2025-06-30 PROCEDURE — 83970 ASSAY OF PARATHORMONE: CPT

## 2025-06-30 PROCEDURE — 82570 ASSAY OF URINE CREATININE: CPT

## 2025-06-30 PROCEDURE — 82040 ASSAY OF SERUM ALBUMIN: CPT

## 2025-06-30 PROCEDURE — 80061 LIPID PANEL: CPT

## 2025-06-30 PROCEDURE — 80048 BASIC METABOLIC PNL TOTAL CA: CPT

## 2025-06-30 PROCEDURE — 82306 VITAMIN D 25 HYDROXY: CPT

## 2025-06-30 PROCEDURE — 85025 COMPLETE CBC W/AUTO DIFF WBC: CPT

## 2025-07-01 ENCOUNTER — RESULTS FOLLOW-UP (OUTPATIENT)
Dept: FAMILY MEDICINE CLINIC | Age: 85
End: 2025-07-01

## 2025-07-15 ENCOUNTER — OFFICE VISIT (OUTPATIENT)
Dept: NEPHROLOGY | Age: 85
End: 2025-07-15
Payer: MEDICARE

## 2025-07-15 ENCOUNTER — OFFICE VISIT (OUTPATIENT)
Dept: FAMILY MEDICINE CLINIC | Age: 85
End: 2025-07-15
Payer: MEDICARE

## 2025-07-15 VITALS
HEART RATE: 58 BPM | DIASTOLIC BLOOD PRESSURE: 68 MMHG | SYSTOLIC BLOOD PRESSURE: 134 MMHG | WEIGHT: 166 LBS | HEIGHT: 62 IN | BODY MASS INDEX: 30.55 KG/M2

## 2025-07-15 DIAGNOSIS — F41.9 ANXIETY: ICD-10-CM

## 2025-07-15 DIAGNOSIS — D47.2 MONOCLONAL GAMMOPATHY: ICD-10-CM

## 2025-07-15 DIAGNOSIS — M11.20 CALCIUM PYROPHOSPHATE DEPOSITION DISEASE (CPPD): ICD-10-CM

## 2025-07-15 DIAGNOSIS — D47.2 MGUS (MONOCLONAL GAMMOPATHY OF UNKNOWN SIGNIFICANCE): ICD-10-CM

## 2025-07-15 DIAGNOSIS — N18.32 CHRONIC KIDNEY DISEASE, STAGE 3B (HCC): Primary | ICD-10-CM

## 2025-07-15 DIAGNOSIS — I10 ESSENTIAL HYPERTENSION: ICD-10-CM

## 2025-07-15 DIAGNOSIS — N25.81 SECONDARY HYPERPARATHYROIDISM: ICD-10-CM

## 2025-07-15 DIAGNOSIS — I10 ESSENTIAL HYPERTENSION: Primary | ICD-10-CM

## 2025-07-15 DIAGNOSIS — E67.3 HYPERVITAMINOSIS D: ICD-10-CM

## 2025-07-15 PROCEDURE — 3075F SYST BP GE 130 - 139MM HG: CPT | Performed by: INTERNAL MEDICINE

## 2025-07-15 PROCEDURE — 1160F RVW MEDS BY RX/DR IN RCRD: CPT | Performed by: INTERNAL MEDICINE

## 2025-07-15 PROCEDURE — 1123F ACP DISCUSS/DSCN MKR DOCD: CPT | Performed by: INTERNAL MEDICINE

## 2025-07-15 PROCEDURE — 1159F MED LIST DOCD IN RCRD: CPT

## 2025-07-15 PROCEDURE — G8399 PT W/DXA RESULTS DOCUMENT: HCPCS

## 2025-07-15 PROCEDURE — G8417 CALC BMI ABV UP PARAM F/U: HCPCS | Performed by: INTERNAL MEDICINE

## 2025-07-15 PROCEDURE — 3075F SYST BP GE 130 - 139MM HG: CPT

## 2025-07-15 PROCEDURE — 1090F PRES/ABSN URINE INCON ASSESS: CPT

## 2025-07-15 PROCEDURE — 99212 OFFICE O/P EST SF 10 MIN: CPT

## 2025-07-15 PROCEDURE — G8427 DOCREV CUR MEDS BY ELIG CLIN: HCPCS | Performed by: INTERNAL MEDICINE

## 2025-07-15 PROCEDURE — G8417 CALC BMI ABV UP PARAM F/U: HCPCS

## 2025-07-15 PROCEDURE — 1160F RVW MEDS BY RX/DR IN RCRD: CPT

## 2025-07-15 PROCEDURE — 1123F ACP DISCUSS/DSCN MKR DOCD: CPT

## 2025-07-15 PROCEDURE — G8399 PT W/DXA RESULTS DOCUMENT: HCPCS | Performed by: INTERNAL MEDICINE

## 2025-07-15 PROCEDURE — 1090F PRES/ABSN URINE INCON ASSESS: CPT | Performed by: INTERNAL MEDICINE

## 2025-07-15 PROCEDURE — 3078F DIAST BP <80 MM HG: CPT

## 2025-07-15 PROCEDURE — 3078F DIAST BP <80 MM HG: CPT | Performed by: INTERNAL MEDICINE

## 2025-07-15 PROCEDURE — 99214 OFFICE O/P EST MOD 30 MIN: CPT

## 2025-07-15 PROCEDURE — 1036F TOBACCO NON-USER: CPT

## 2025-07-15 PROCEDURE — 99213 OFFICE O/P EST LOW 20 MIN: CPT | Performed by: INTERNAL MEDICINE

## 2025-07-15 PROCEDURE — 1036F TOBACCO NON-USER: CPT | Performed by: INTERNAL MEDICINE

## 2025-07-15 PROCEDURE — G8427 DOCREV CUR MEDS BY ELIG CLIN: HCPCS

## 2025-07-15 PROCEDURE — 1159F MED LIST DOCD IN RCRD: CPT | Performed by: INTERNAL MEDICINE

## 2025-07-15 SDOH — ECONOMIC STABILITY: FOOD INSECURITY: WITHIN THE PAST 12 MONTHS, THE FOOD YOU BOUGHT JUST DIDN'T LAST AND YOU DIDN'T HAVE MONEY TO GET MORE.: NEVER TRUE

## 2025-07-15 SDOH — ECONOMIC STABILITY: FOOD INSECURITY: WITHIN THE PAST 12 MONTHS, YOU WORRIED THAT YOUR FOOD WOULD RUN OUT BEFORE YOU GOT MONEY TO BUY MORE.: NEVER TRUE

## 2025-07-15 NOTE — PROGRESS NOTES
Nephrology Progress Note    Anum Bryant, Wesly MOORE, APRN - CNP      SUBJECTIVE      Chief Complaint   Patient presents with    Chronic Kidney Disease     Yearly follow up      7/15/25: Labs 6/30/2025: Sodium 145 potassium 4.4 chloride 107 bicarb 27 BUN 17 creatinine 1.5 calcium 10.1 phosphorus 3.3 albumin 4.2 PTH 79 vitamin D 103 hemoglobin 13.9 white count 5.5 platelets 165 urinalysis UPC less than 0.1  Patient was seen Dr. Watt before.  Now comes in to see me.  Known history of chronic kidney disease stage IIIb baseline 1.5 likely ischemic nephrosclerosis.  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  Previous evaluations have shown IgA kappa M spike of 0.07 g/dL, kappa lambda free light chain ratio has been 11.3.  She is never seen heme-onc.  Last checked in 2022.  She is also had history of breast cancer, currently not on any treatment, history of cardiomyopathy ejection fraction 50% moderate MR and moderate AI.  Previous cardiac catheterization did not require any intervention.  She has had episodes of pneumonia as well.  She has chronic A-fib that is failed ablation procedure in the past.  She has secondary hyperparathyroidism PTH 79 does not need treatment.  More recent labs in June 2025 show elevated vitamin D levels and mildly elevated calcium levels.  Home medicines include Eliquis, amiodarone, Klor-Con and Lasix  Today on follow-up she denies any complaints.  No shortness of breath orthopnea.  No lower extremity edema.  No nocturia.  No urinary complaints.  Effort tolerance is good.  Continues Lasix.  A-fib continues rate controlled on amiodarone and beta-blockers.  Also on Eliquis.  No overt decompensation of CHF.  Does have hypervitaminosis D vitamin D was held.     Pt denies any hx of heavy or prolonged NSAID use and there is no history of OTC herbal medications . No history of dysuria or frequency.  Pt denies any hx of recent UTI , incontinence or nocturia or

## 2025-07-15 NOTE — PATIENT INSTRUCTIONS
Please show below to your oncologist-hematologist    Previous evaluations have shown IgA kappa M spike of 0.07 g/dL, kappa lambda free light chain ratio has been 11.3. She is never seen heme-onc. Last checked in 2022. -Nephrology is checking this again,7/16/2025.   [place] is wondering if you would be able to follow-up on this and treat if necessary.

## 2025-07-15 NOTE — ASSESSMENT & PLAN NOTE
Chronic, at goal (stable), continue current treatment plan  Initial BP reading slightly elevated repeat check shows normal tension.  Heart rate little on the low side she is on beta-blocker.  She reports taking half tab today.  She is encouraged to monitor blood pressure at home and report these to the office.

## 2025-07-15 NOTE — PROGRESS NOTES
Anum Oliveira (:  1940) is a 84 y.o. female,Established patient, here for evaluation of the following chief complaint(s):  6 Month Follow-Up (Patient is here today for a 6 month follow up.)         Assessment & Plan  Essential hypertension   Chronic, at goal (stable), continue current treatment plan  Initial BP reading slightly elevated repeat check shows normal tension.  Heart rate little on the low side she is on beta-blocker.  She reports taking half tab today.  She is encouraged to monitor blood pressure at home and report these to the office.       Anxiety   Chronic, at goal (stable), continue current treatment plan continues on Zoloft doing well currently, denies any exacerbations of current condition.  Not suicidal or homicidal.    Orders:    sertraline (ZOLOFT) 50 MG tablet; Take 1 tablet by mouth daily      No follow-ups on file.       Subjective   [place]ia is here today for 6-month follow-up appointment.  She does have a history of hypertension, does follow with cardio for A-fib.  She is on amiodarone.  She takes her beta-blocker once a day, denies any current complaints such as leg swelling, chest pain, shortness of breath, dizziness when going from laying to standing or sitting to standing.  She also has a history of anxiety, on Zoloft doing well currently, no suicidal homicidal ideations.  She denies any acute anxiety symptoms, reports her symptoms are well-controlled.  Health maintenance is reviewed and updated at today's office        Review of Systems   Constitutional:  Negative for chills and fever.   HENT:  Negative for congestion.    Respiratory:  Negative for cough and shortness of breath.    Cardiovascular:  Negative for chest pain, palpitations and leg swelling.   Gastrointestinal:  Negative for abdominal pain.   Skin:  Negative for color change.          Objective   Physical Exam  Vitals and nursing note reviewed.   HENT:      Head: Normocephalic.      Nose: Nose normal.

## 2025-07-19 DIAGNOSIS — I48.0 PAROXYSMAL ATRIAL FIBRILLATION (HCC): ICD-10-CM

## 2025-07-19 DIAGNOSIS — I50.23 HEART FAILURE, SYSTOLIC, ACUTE ON CHRONIC (HCC): ICD-10-CM

## 2025-07-21 ENCOUNTER — CLINICAL DOCUMENTATION (OUTPATIENT)
Dept: NEPHROLOGY | Age: 85
End: 2025-07-21

## 2025-07-21 ENCOUNTER — HOSPITAL ENCOUNTER (OUTPATIENT)
Age: 85
Discharge: HOME | End: 2025-07-21
Payer: MEDICARE

## 2025-07-21 VITALS
OXYGEN SATURATION: 98 % | HEART RATE: 44 BPM | BODY MASS INDEX: 30.36 KG/M2 | SYSTOLIC BLOOD PRESSURE: 132 MMHG | WEIGHT: 166 LBS | DIASTOLIC BLOOD PRESSURE: 82 MMHG

## 2025-07-21 DIAGNOSIS — N18.32 CHRONIC KIDNEY DISEASE, STAGE 3B (HCC): ICD-10-CM

## 2025-07-21 DIAGNOSIS — D47.2 MGUS (MONOCLONAL GAMMOPATHY OF UNKNOWN SIGNIFICANCE): Primary | ICD-10-CM

## 2025-07-21 DIAGNOSIS — I48.0 PAROXYSMAL ATRIAL FIBRILLATION (HCC): ICD-10-CM

## 2025-07-21 DIAGNOSIS — D47.2 MONOCLONAL GAMMOPATHY: ICD-10-CM

## 2025-07-21 DIAGNOSIS — I50.23 HEART FAILURE, SYSTOLIC, ACUTE ON CHRONIC (HCC): ICD-10-CM

## 2025-07-21 LAB
FREE KAPPA/LAMBDA RATIO: 9.13 (ref 0.22–1.74)
KAPPA LC FREE SER-MCNC: 179 MG/L
LAMBDA LC FREE SERPL-MCNC: 19.6 MG/L (ref 4.2–27.7)

## 2025-07-21 PROCEDURE — 84165 PROTEIN E-PHORESIS SERUM: CPT

## 2025-07-21 PROCEDURE — 86334 IMMUNOFIX E-PHORESIS SERUM: CPT

## 2025-07-21 PROCEDURE — 36415 COLL VENOUS BLD VENIPUNCTURE: CPT

## 2025-07-21 PROCEDURE — 84155 ASSAY OF PROTEIN SERUM: CPT

## 2025-07-21 PROCEDURE — 83521 IG LIGHT CHAINS FREE EACH: CPT

## 2025-07-21 RX ORDER — METOPROLOL SUCCINATE 25 MG/1
TABLET, EXTENDED RELEASE ORAL
Qty: 90 TABLET | Refills: 0 | Status: SHIPPED | OUTPATIENT
Start: 2025-07-21 | End: 2025-07-21 | Stop reason: SDUPTHER

## 2025-07-21 RX ORDER — METOPROLOL SUCCINATE 25 MG/1
25 TABLET, EXTENDED RELEASE ORAL DAILY
Qty: 90 TABLET | Refills: 0 | Status: SHIPPED | OUTPATIENT
Start: 2025-07-21 | End: 2025-07-21

## 2025-07-21 RX ORDER — METOPROLOL SUCCINATE 25 MG/1
12.5 TABLET, EXTENDED RELEASE ORAL DAILY
Qty: 90 TABLET | Refills: 0 | Status: SHIPPED | OUTPATIENT
Start: 2025-07-21

## 2025-07-21 ASSESSMENT — ENCOUNTER SYMPTOMS
COUGH: 0
COLOR CHANGE: 0
ABDOMINAL PAIN: 0
SHORTNESS OF BREATH: 0

## 2025-07-21 NOTE — TELEPHONE ENCOUNTER
Anum Oliveira is requesting a refill on the following medication(s):  Requested Prescriptions     Pending Prescriptions Disp Refills    metoprolol succinate (TOPROL XL) 25 MG extended release tablet [Pharmacy Med Name: Metoprolol Succinate ER 25 MG Oral Tablet Extended Release 24 Hour] 90 tablet 0     Sig: Take 1/2 (one-half) tablet by mouth once daily       Last Visit Date (If Applicable):  7/15/2025      Next Visit Date:    1/8/2026

## 2025-07-21 NOTE — TELEPHONE ENCOUNTER
Anum Oliveira is requesting a refill on the following medication(s):  Requested Prescriptions     Pending Prescriptions Disp Refills    metoprolol succinate (TOPROL XL) 25 MG extended release tablet 90 tablet 0     Sig: Take 1 tablet by mouth daily       Last Visit Date (If Applicable):  7/15/2025    Next Visit Date:    1/8/2026

## 2025-07-22 LAB
ALBUMIN PERCENT: 60 % (ref 56–66)
ALBUMIN SERPL-MCNC: 3.8 G/DL (ref 3.2–5.2)
ALPHA 2 PERCENT: 11 % (ref 7–12)
ALPHA1 GLOB SERPL ELPH-MCNC: 0.3 G/DL (ref 0.1–0.4)
ALPHA1 GLOB SERPL ELPH-MCNC: 4 % (ref 3–5)
ALPHA2 GLOB SERPL ELPH-MCNC: 0.7 G/DL (ref 0.5–0.9)
B-GLOBULIN SERPL ELPH-MCNC: 0.9 G/DL (ref 0.7–1.4)
B-GLOBULIN SERPL ELPH-MCNC: 14 % (ref 8–13)
GAMMA GLOB SERPL ELPH-MCNC: 0.7 G/DL (ref 0.5–1.5)
GAMMA GLOBULIN %: 11 % (ref 11–19)
ITYP INTERPRETATION: NORMAL
PATH REV: NORMAL
PATHOLOGIST: ABNORMAL
PROT PATTERN SERPL ELPH-IMP: ABNORMAL
PROT SERPL-MCNC: 6.3 G/DL (ref 6.6–8.7)
TOTAL PROT. SUM,%: 100 % (ref 98–102)
TOTAL PROT. SUM: 6.4 G/DL (ref 6.3–8.2)

## 2025-08-28 RX ORDER — AMIODARONE HYDROCHLORIDE 100 MG/1
100 TABLET ORAL DAILY
Qty: 30 TABLET | Refills: 0 | Status: SHIPPED | OUTPATIENT
Start: 2025-08-28